# Patient Record
Sex: FEMALE | Race: WHITE | NOT HISPANIC OR LATINO | Employment: OTHER | URBAN - METROPOLITAN AREA
[De-identification: names, ages, dates, MRNs, and addresses within clinical notes are randomized per-mention and may not be internally consistent; named-entity substitution may affect disease eponyms.]

---

## 2017-01-05 ENCOUNTER — TELEPHONE (OUTPATIENT)
Dept: INTERNAL MEDICINE | Facility: CLINIC | Age: 77
End: 2017-01-05

## 2017-01-05 DIAGNOSIS — S52.502D CLOSED FRACTURE OF DISTAL END OF LEFT RADIUS WITH ROUTINE HEALING, UNSPECIFIED FRACTURE MORPHOLOGY, SUBSEQUENT ENCOUNTER: Primary | ICD-10-CM

## 2017-01-05 DIAGNOSIS — R30.0 DYSURIA: Primary | ICD-10-CM

## 2017-01-05 RX ORDER — TRAMADOL HYDROCHLORIDE 50 MG/1
50 TABLET ORAL
Qty: 30 TABLET | Refills: 0 | Status: SHIPPED | OUTPATIENT
Start: 2017-01-05 | End: 2017-02-15

## 2017-01-05 NOTE — TELEPHONE ENCOUNTER
Reason for Call:  Medication or medication refill:    Do you use a [facility]?  Name of the pharmacy and phone number for the current request:  [facility] - [phone]    Name of the medication requested: traMADol (ULTRAM) 50 MG tablet    Other request: patient will be going to [place] in about 2 weeks-requesting a refill on her Tramadol so she has it while she is gone. Please advise     Can we leave a detailed message on this number? YES    Phone number patient can be reached at: Home number on file [phone] (home)    Best Time: any     Call taken on 1/5/2017 at 2:01 PM by Claire Gonzalez

## 2017-01-05 NOTE — TELEPHONE ENCOUNTER
traMADol (ULTRAM) 50 MG tablet      Last Written Prescription Date:  10/11/16  Last Fill Quantity: 30,   # refills: 0  Last Office Visit with [facility], [facility] or [facility] prescribing provider: 10/21/16  Future Office visit:       Routing refill request to provider for review/approval because:  Drug not on the [facility], [facility] or [facility] refill protocol or controlled substance

## 2017-01-05 NOTE — TELEPHONE ENCOUNTER
Reason for Call: Request for an order or referral:    Order or referral being requested: UA     Date needed: as soon as possible    Has the patient been seen by the PCP for this problem? YES    Additional comments: patient believes she has uti and requesting a ua experiancing itching and burning     Phone number Patient can be reached at:  Home number on file [phone] (home)    Best Time:  any    Can we leave a detailed message on this number?  YES    Call taken on 1/5/2017 at 1:59 PM by Claire Gonzalez

## 2017-01-06 DIAGNOSIS — R30.0 DYSURIA: ICD-10-CM

## 2017-01-06 LAB
ALBUMIN UR-MCNC: 30 MG/DL
APPEARANCE UR: CLEAR
BACTERIA #/AREA URNS HPF: ABNORMAL /HPF
BILIRUB UR QL STRIP: NEGATIVE
COLOR UR AUTO: YELLOW
GLUCOSE UR STRIP-MCNC: NEGATIVE MG/DL
HGB UR QL STRIP: ABNORMAL
KETONES UR STRIP-MCNC: ABNORMAL MG/DL
LEUKOCYTE ESTERASE UR QL STRIP: ABNORMAL
NITRATE UR QL: POSITIVE
NON-SQ EPI CELLS #/AREA URNS LPF: ABNORMAL /LPF
PH UR STRIP: 6.5 PH (ref 5–7)
RBC #/AREA URNS AUTO: ABNORMAL /HPF (ref 0–2)
SP GR UR STRIP: 1.02 (ref 1–1.03)
URN SPEC COLLECT METH UR: ABNORMAL
UROBILINOGEN UR STRIP-ACNC: 0.2 EU/DL (ref 0.2–1)
WBC #/AREA URNS AUTO: ABNORMAL /HPF (ref 0–2)

## 2017-01-06 PROCEDURE — 87086 URINE CULTURE/COLONY COUNT: CPT | Mod: 90 | Performed by: INTERNAL MEDICINE

## 2017-01-06 PROCEDURE — 87088 URINE BACTERIA CULTURE: CPT | Mod: 90 | Performed by: INTERNAL MEDICINE

## 2017-01-06 PROCEDURE — 99000 SPECIMEN HANDLING OFFICE-LAB: CPT | Performed by: INTERNAL MEDICINE

## 2017-01-06 PROCEDURE — 87186 SC STD MICRODIL/AGAR DIL: CPT | Mod: 90 | Performed by: INTERNAL MEDICINE

## 2017-01-06 PROCEDURE — 81001 URINALYSIS AUTO W/SCOPE: CPT | Performed by: INTERNAL MEDICINE

## 2017-01-08 LAB
BACTERIA SPEC CULT: ABNORMAL
MICRO REPORT STATUS: ABNORMAL
MICROORGANISM SPEC CULT: ABNORMAL
SPECIMEN SOURCE: ABNORMAL

## 2017-01-10 ENCOUNTER — TELEPHONE (OUTPATIENT)
Dept: INTERNAL MEDICINE | Facility: CLINIC | Age: 77
End: 2017-01-10

## 2017-01-10 DIAGNOSIS — N30.00 ACUTE CYSTITIS WITHOUT HEMATURIA: Primary | ICD-10-CM

## 2017-01-10 RX ORDER — FLUCONAZOLE 150 MG/1
150 TABLET ORAL ONCE
Qty: 1 TABLET | Refills: 0 | Status: SHIPPED | OUTPATIENT
Start: 2017-01-10 | End: 2017-01-10

## 2017-01-10 RX ORDER — CIPROFLOXACIN 500 MG/1
500 TABLET, FILM COATED ORAL 2 TIMES DAILY
Qty: 14 TABLET | Refills: 0 | Status: SHIPPED | OUTPATIENT
Start: 2017-01-10 | End: 2018-05-10

## 2017-01-10 NOTE — PROGRESS NOTES
Quick Note:    Dear Charlotte, your recent test results are attached.   Your urine culture Demonstrates Klebsiella.   I will send a prescription to your pharmacy. We should recheck your urine upon completion.  Feel free to contact me via the office or My Chart if you have any questions regarding the above.    Sincerely,  Aron Villa, DO FACOI    ______

## 2017-01-20 ENCOUNTER — TELEPHONE (OUTPATIENT)
Dept: INTERNAL MEDICINE | Facility: CLINIC | Age: 77
End: 2017-01-20

## 2017-01-20 ENCOUNTER — TELEPHONE (OUTPATIENT)
Dept: INTERNAL MEDICINE | Facility: OTHER | Age: 77
End: 2017-01-20

## 2017-01-20 DIAGNOSIS — N39.0 RECURRENT URINARY TRACT INFECTION: Primary | ICD-10-CM

## 2017-01-20 NOTE — TELEPHONE ENCOUNTER
Tammi Christianson at 1/20/2017  9:32 AM      Status: Signed         Expand All Collapse All    Reason for Call: Request for an order or referral:    Order or referral being requested: Urologist. UTI's    Date needed: at your convenience    Has the patient been seen by the PCP for this problem? YES    Additional comments:     Phone number Patient can be reached at:       Best Time:      Can we leave a detailed message on this number?  YES    Call taken on 1/20/2017 at 9:32 AM by Tammi Christianson

## 2017-01-20 NOTE — TELEPHONE ENCOUNTER
[facility] [phone]  Patient notified. Florence EDMOND

## 2017-01-20 NOTE — TELEPHONE ENCOUNTER
Reason for Call: Request for an order or referral:    Order or referral being requested: Urologist. UTI's    Date needed: at your convenience    Has the patient been seen by the PCP for this problem? YES    Additional comments:     Phone number Patient can be reached at:      Best Time:      Can we leave a detailed message on this number?  YES    Call taken on 1/20/2017 at 9:32 AM by Tammi Christianson

## 2017-01-26 ENCOUNTER — TRANSFERRED RECORDS (OUTPATIENT)
Dept: HEALTH INFORMATION MANAGEMENT | Facility: CLINIC | Age: 77
End: 2017-01-26

## 2017-01-26 DIAGNOSIS — N39.0 URINARY TRACT INFECTION, SITE UNSPECIFIED: Primary | ICD-10-CM

## 2017-01-31 ENCOUNTER — HOSPITAL ENCOUNTER (OUTPATIENT)
Dept: ULTRASOUND IMAGING | Facility: CLINIC | Age: 77
Discharge: HOME | End: 2017-01-31
Attending: UROLOGY | Admitting: UROLOGY
Payer: MEDICARE

## 2017-01-31 DIAGNOSIS — N39.0 URINARY TRACT INFECTION, SITE UNSPECIFIED: ICD-10-CM

## 2017-01-31 PROCEDURE — 76770 US EXAM ABDO BACK WALL COMP: CPT

## 2017-01-31 NOTE — PROGRESS NOTES
Appropriate assistive devices provided during their visit.na  Exam table and/or cart  placed in the lowest position. yes  Brakes on tables/carts/wheelchairs used at all times. na  Non slip footwear applied.na    Patient was accompanied by staff throughout visit. yes  Equipment safety straps used.na  Assist with toileting.na

## 2017-02-10 ENCOUNTER — TELEPHONE (OUTPATIENT)
Dept: FAMILY MEDICINE | Facility: OTHER | Age: 77
End: 2017-02-10

## 2017-02-10 DIAGNOSIS — N39.41 URGENCY INCONTINENCE: Primary | ICD-10-CM

## 2017-02-10 RX ORDER — ALFUZOSIN HCL 10 MG/1
10 TABLET, EXTENDED RELEASE ORAL DAILY
Qty: 30 TABLET | Refills: 1 | COMMUNITY
Start: 2017-02-10 | End: 2022-03-01

## 2017-02-10 NOTE — TELEPHONE ENCOUNTER
Message from Aron Villa DO sent at 2/10/2017  4:42 PM CST -----        The patient can continue her simvastatin with the Alfuzosin.  There should be no significant interaction.              Thank you              Daisy      Patient notified. Florence EDMOND

## 2017-02-10 NOTE — TELEPHONE ENCOUNTER
Reason for Call:  Other prescription    Detailed comments: Pt states she was given alfuzosin 10 MG by Dr. Bertrand, and pt is wondering if she can start taking her simvastatin with this. Pt states she wants Dr. Villa's advice.    Phone Number Patient can be reached at: Home number on file [phone] (home)    Best Time: anytime    Can we leave a detailed message on this number? YES    Call taken on 2/10/2017 at 4:09 PM by Kell Moreland

## 2017-02-15 ENCOUNTER — TELEPHONE (OUTPATIENT)
Dept: FAMILY MEDICINE | Facility: OTHER | Age: 77
End: 2017-02-15

## 2017-02-15 DIAGNOSIS — S52.502D CLOSED FRACTURE OF DISTAL END OF LEFT RADIUS WITH ROUTINE HEALING, UNSPECIFIED FRACTURE MORPHOLOGY, SUBSEQUENT ENCOUNTER: ICD-10-CM

## 2017-02-15 RX ORDER — TRAMADOL HYDROCHLORIDE 50 MG/1
50 TABLET ORAL
Qty: 30 TABLET | Refills: 0 | Status: SHIPPED | OUTPATIENT
Start: 2017-02-15 | End: 2017-08-25

## 2017-02-15 RX ORDER — TRAMADOL HYDROCHLORIDE 50 MG/1
50 TABLET ORAL
Qty: 30 TABLET | Refills: 0 | Status: CANCELLED | OUTPATIENT
Start: 2017-02-15

## 2017-02-27 ENCOUNTER — TELEPHONE (OUTPATIENT)
Dept: INTERNAL MEDICINE | Facility: OTHER | Age: 77
End: 2017-02-27

## 2017-02-27 DIAGNOSIS — R30.0 DYSURIA: ICD-10-CM

## 2017-02-27 DIAGNOSIS — R30.0 DYSURIA: Primary | ICD-10-CM

## 2017-02-27 LAB
ALBUMIN UR-MCNC: 30 MG/DL
APPEARANCE UR: CLEAR
BACTERIA #/AREA URNS HPF: ABNORMAL /HPF
BILIRUB UR QL STRIP: NEGATIVE
COLOR UR AUTO: YELLOW
GLUCOSE UR STRIP-MCNC: NEGATIVE MG/DL
HGB UR QL STRIP: NEGATIVE
KETONES UR STRIP-MCNC: NEGATIVE MG/DL
LEUKOCYTE ESTERASE UR QL STRIP: ABNORMAL
NITRATE UR QL: NEGATIVE
NON-SQ EPI CELLS #/AREA URNS LPF: ABNORMAL /LPF
PH UR STRIP: 7 PH (ref 5–7)
RBC #/AREA URNS AUTO: ABNORMAL /HPF (ref 0–2)
SP GR UR STRIP: 1.02 (ref 1–1.03)
URN SPEC COLLECT METH UR: ABNORMAL
UROBILINOGEN UR STRIP-ACNC: 0.2 EU/DL (ref 0.2–1)
WBC #/AREA URNS AUTO: ABNORMAL /HPF (ref 0–2)

## 2017-02-27 PROCEDURE — 81001 URINALYSIS AUTO W/SCOPE: CPT | Performed by: INTERNAL MEDICINE

## 2017-02-27 PROCEDURE — 87086 URINE CULTURE/COLONY COUNT: CPT | Performed by: INTERNAL MEDICINE

## 2017-02-27 NOTE — TELEPHONE ENCOUNTER
Reason for Call: Request for an order or referral:    Order or referral being requested: lab    Date needed: as soon as possible    Has the patient been seen by the PCP for this problem? YES    Additional comments: poss UTI.     Phone number Patient can be reached at:  Home number on file [phone] (home)    Best Time:      Can we leave a detailed message on this number?  YES    Call taken on 2/27/2017 at 10:15 AM by Tammi Christianson

## 2017-02-27 NOTE — TELEPHONE ENCOUNTER
Provider please advise, can orders be placed for a UA or should the patient be seen in clinic?  Sav Waterman MA

## 2017-03-01 LAB
BACTERIA SPEC CULT: NORMAL
MICRO REPORT STATUS: NORMAL
SPECIMEN SOURCE: NORMAL

## 2017-03-02 ENCOUNTER — TRANSFERRED RECORDS (OUTPATIENT)
Dept: HEALTH INFORMATION MANAGEMENT | Facility: CLINIC | Age: 77
End: 2017-03-02

## 2017-03-02 DIAGNOSIS — E11.42 TYPE 2 DIABETES MELLITUS WITH DIABETIC POLYNEUROPATHY (H): ICD-10-CM

## 2017-03-02 NOTE — TELEPHONE ENCOUNTER
actos         Last Written Prescription Date: 5/20/16  Last Fill Quantity: 90, # refills: 3  Last Office Visit with [facility] or [facility] prescribing provider:  10/21/16        BP Readings from Last 3 Encounters:   12/07/16 118/58   12/03/16 126/66   10/21/16 124/70     Lab Results   Component Value Date    MICROL 17 10/10/2016     No results found for: MICROALBUMIN  Creatinine   Date Value Ref Range Status   10/21/2016 0.74 0.52 - 1.04 mg/dL Final   ]  GFR Estimate   Date Value Ref Range Status   10/21/2016 76 >60 mL/min/1.7m2 Final     Comment:     Non  GFR Calc   08/05/2016 61 >60 mL/min/1.7m2 Final     Comment:     Non  GFR Calc   05/13/2016 >90  Non  GFR Calc   >60 mL/min/1.7m2 Final     GFR Estimate If Black   Date Value Ref Range Status   10/21/2016 >90   GFR Calc   >60 mL/min/1.7m2 Final   08/05/2016 73 >60 mL/min/1.7m2 Final     Comment:      GFR Calc   05/13/2016 >90   GFR Calc   >60 mL/min/1.7m2 Final     Lab Results   Component Value Date    CHOL 165 10/10/2016     Lab Results   Component Value Date    HDL 66 10/10/2016     Lab Results   Component Value Date    LDL 77 10/10/2016    LDL 68 06/26/2012     Lab Results   Component Value Date    TRIG 110 10/10/2016     Lab Results   Component Value Date    CHOLHDLRATIO 2.5 08/19/2015     Lab Results   Component Value Date    AST 31 07/23/2012     Lab Results   Component Value Date    ALT 28 08/18/2014     Lab Results   Component Value Date    A1C 6.8 10/21/2016    A1C 7.1 05/13/2016    A1C 7.0 06/08/2015    A1C 6.9 11/12/2014    A1C 7.0 08/18/2014     Potassium   Date Value Ref Range Status   10/21/2016 4.1 3.4 - 5.3 mmol/L Final

## 2017-03-03 NOTE — PROGRESS NOTES
Dear Charlotte, your recent test results are attached.  Your urine sample demonstrated Routine sampling contamination.  This is normal.              Feel free to contact me via the office or My Chart if you have any questions regarding the above.    Sincerely,  DO JT Thompson

## 2017-03-06 RX ORDER — PIOGLITAZONEHYDROCHLORIDE 30 MG/1
TABLET ORAL
Qty: 90 TABLET | Refills: 3 | OUTPATIENT
Start: 2017-03-06

## 2017-03-06 NOTE — TELEPHONE ENCOUNTER
Prescription was sent 5/20/16 for #90 with 3 refills. Patient should have medication through 05/2017.   Pharmacy notified via E-Prescribe refusal.     Raya Garrett RN  [facility]

## 2017-05-05 ENCOUNTER — TELEPHONE (OUTPATIENT)
Dept: FAMILY MEDICINE | Facility: OTHER | Age: 77
End: 2017-05-05

## 2017-05-05 DIAGNOSIS — Z29.89 SBE (SUBACUTE BACTERIAL ENDOCARDITIS) PROPHYLAXIS CANDIDATE: Primary | ICD-10-CM

## 2017-05-05 RX ORDER — AMOXICILLIN 500 MG/1
2000 CAPSULE ORAL ONCE
Qty: 4 CAPSULE | Refills: 0 | Status: SHIPPED | OUTPATIENT
Start: 2017-05-05 | End: 2017-05-05

## 2017-05-05 NOTE — TELEPHONE ENCOUNTER
Reason for Call:  Other call back    Detailed comments: Patient states that she has an abscess on her tooth and she is wanting to go into the dentist today but she says she has metal in her body and she was advised that when she goes to the dentist she needs an antibiotic. Patient is currently taking Alfuzosin and she is wondering if that will be good enough or if she needs to have another antibiotic before she goes to the dentist. She is wanting to go into the dentist as soon as possible. Informed her that Daisy is out of office, wondering if another doctor could help in his absence. Please advise.     Phone Number Patient can be reached at: Cell number on file:    Telephone Information:   Mobile [phone]       Best Time: any    Can we leave a detailed message on this number? YES    Call taken on 5/5/2017 at 8:11 AM by Mihir Brewer

## 2017-05-19 DIAGNOSIS — B37.31 CANDIDIASIS OF VULVA AND VAGINA: ICD-10-CM

## 2017-05-19 RX ORDER — FLUCONAZOLE 150 MG/1
150 TABLET ORAL
Qty: 4 TABLET | Refills: 0 | Status: SHIPPED | OUTPATIENT
Start: 2017-05-19 | End: 2018-05-10

## 2017-05-19 NOTE — TELEPHONE ENCOUNTER
The patient will need to discontinue her simvastatin while taking the Diflucan. The concurrent usage of these 2 medications may increase her risk of deleterious side effects.  Daisy

## 2017-05-19 NOTE — TELEPHONE ENCOUNTER
Patient was given an antibiotic by her dentist for tooth infection. She now has a yeast infection and would like diflucan refill. Florence EDMOND

## 2017-08-22 DIAGNOSIS — E11.42 TYPE 2 DIABETES MELLITUS WITH DIABETIC POLYNEUROPATHY, WITHOUT LONG-TERM CURRENT USE OF INSULIN (H): ICD-10-CM

## 2017-08-22 DIAGNOSIS — G62.9 PERIPHERAL POLYNEUROPATHY: ICD-10-CM

## 2017-08-22 NOTE — TELEPHONE ENCOUNTER
Metformin         Last Written Prescription Date: 5/20/16  Last Fill Quantity: 270, # refills: 3  Last Office Visit with [facility], [facility] or  [facility] prescribing provider:  10/21/16        BP Readings from Last 3 Encounters:   12/07/16 118/58   12/03/16 126/66   10/21/16 124/70     Lab Results   Component Value Date    MICROL 17 10/10/2016     Lab Results   Component Value Date    UMALCR 23.94 10/10/2016     Creatinine   Date Value Ref Range Status   10/21/2016 0.74 0.52 - 1.04 mg/dL Final   ]  GFR Estimate   Date Value Ref Range Status   10/21/2016 76 >60 mL/min/1.7m2 Final     Comment:     Non  GFR Calc   08/05/2016 61 >60 mL/min/1.7m2 Final     Comment:     Non  GFR Calc   05/13/2016 >90  Non  GFR Calc   >60 mL/min/1.7m2 Final     GFR Estimate If Black   Date Value Ref Range Status   10/21/2016 >90   GFR Calc   >60 mL/min/1.7m2 Final   08/05/2016 73 >60 mL/min/1.7m2 Final     Comment:      GFR Calc   05/13/2016 >90   GFR Calc   >60 mL/min/1.7m2 Final     Lab Results   Component Value Date    CHOL 165 10/10/2016     Lab Results   Component Value Date    HDL 66 10/10/2016     Lab Results   Component Value Date    LDL 77 10/10/2016     Lab Results   Component Value Date    TRIG 110 10/10/2016     Lab Results   Component Value Date    CHOLHDLRATIO 2.5 08/19/2015     Lab Results   Component Value Date    AST 31 07/23/2012     Lab Results   Component Value Date    ALT 28 08/18/2014     Lab Results   Component Value Date    A1C 6.8 10/21/2016    A1C 7.1 05/13/2016    A1C 7.0 06/08/2015    A1C 6.9 11/12/2014    A1C 7.0 08/18/2014     Potassium   Date Value Ref Range Status   10/21/2016 4.1 3.4 - 5.3 mmol/L Final     Gabapentin      Last Written Prescription Date: 5/20/16  Last Fill Quantity: 540,  # refills: 3   Last Office Visit with [facility], [facility] or [facility] prescribing provider: 10/21/16                                              Pioglitazone         Last Written Prescription Date: 5/20/16  Last Fill Quantity: 90, # refills: 3  Last Office Visit with [facility], [facility] or [facility] prescribing provider:  10/21/16        BP Readings from Last 3 Encounters:   12/07/16 118/58   12/03/16 126/66   10/21/16 124/70     Lab Results   Component Value Date    MICROL 17 10/10/2016     Lab Results   Component Value Date    UMALCR 23.94 10/10/2016     Creatinine   Date Value Ref Range Status   10/21/2016 0.74 0.52 - 1.04 mg/dL Final   ]  GFR Estimate   Date Value Ref Range Status   10/21/2016 76 >60 mL/min/1.7m2 Final     Comment:     Non  GFR Calc   08/05/2016 61 >60 mL/min/1.7m2 Final     Comment:     Non  GFR Calc   05/13/2016 >90  Non  GFR Calc   >60 mL/min/1.7m2 Final     GFR Estimate If Black   Date Value Ref Range Status   10/21/2016 >90   GFR Calc   >60 mL/min/1.7m2 Final   08/05/2016 73 >60 mL/min/1.7m2 Final     Comment:      GFR Calc   05/13/2016 >90   GFR Calc   >60 mL/min/1.7m2 Final     Lab Results   Component Value Date    CHOL 165 10/10/2016     Lab Results   Component Value Date    HDL 66 10/10/2016     Lab Results   Component Value Date    LDL 77 10/10/2016     Lab Results   Component Value Date    TRIG 110 10/10/2016     Lab Results   Component Value Date    CHOLHDLRATIO 2.5 08/19/2015     Lab Results   Component Value Date    AST 31 07/23/2012     Lab Results   Component Value Date    ALT 28 08/18/2014     Lab Results   Component Value Date    A1C 6.8 10/21/2016    A1C 7.1 05/13/2016    A1C 7.0 06/08/2015    A1C 6.9 11/12/2014    A1C 7.0 08/18/2014     Potassium   Date Value Ref Range Status   10/21/2016 4.1 3.4 - 5.3 mmol/L Final

## 2017-08-25 ENCOUNTER — TELEPHONE (OUTPATIENT)
Dept: INTERNAL MEDICINE | Facility: CLINIC | Age: 77
End: 2017-08-25

## 2017-08-25 DIAGNOSIS — S52.502D CLOSED FRACTURE OF DISTAL END OF LEFT RADIUS WITH ROUTINE HEALING, UNSPECIFIED FRACTURE MORPHOLOGY, SUBSEQUENT ENCOUNTER: ICD-10-CM

## 2017-08-25 RX ORDER — TRAMADOL HYDROCHLORIDE 50 MG/1
50 TABLET ORAL
Qty: 30 TABLET | Refills: 0 | Status: SHIPPED | OUTPATIENT
Start: 2017-08-25 | End: 2018-06-04

## 2017-08-25 RX ORDER — PIOGLITAZONEHYDROCHLORIDE 30 MG/1
TABLET ORAL
Qty: 90 TABLET | Refills: 3 | Status: SHIPPED | OUTPATIENT
Start: 2017-08-25 | End: 2018-10-02

## 2017-08-25 RX ORDER — METFORMIN HYDROCHLORIDE 750 MG/1
TABLET, EXTENDED RELEASE ORAL
Qty: 180 TABLET | Refills: 3 | Status: SHIPPED | OUTPATIENT
Start: 2017-08-25 | End: 2018-10-02

## 2017-08-25 RX ORDER — GABAPENTIN 300 MG/1
CAPSULE ORAL
Qty: 270 CAPSULE | Refills: 3 | Status: SHIPPED | OUTPATIENT
Start: 2017-08-25 | End: 2018-10-02

## 2017-08-25 NOTE — TELEPHONE ENCOUNTER
Patient calling would like to know when this Rx will be ready, [facility] told patient she needed to call us to check when this would be ready, please call and advise

## 2017-08-25 NOTE — TELEPHONE ENCOUNTER
Reason for Call:  Medication or medication refill:    Do you use a [facility]?  Name of the pharmacy and phone number for the current request:  [facility]    Name of the medication requested: traMADol     Other request: patient would like this Rx faxed to [facility] pharmacy #[phone]    Can we leave a detailed message on this number? YES    Phone number patient can be reached at: Home number on file [phone] (home)    Best Time: any     Call taken on 8/25/2017 at 12:09 PM by Vilma Conteh

## 2017-09-08 ENCOUNTER — TELEPHONE (OUTPATIENT)
Dept: INTERNAL MEDICINE | Facility: OTHER | Age: 77
End: 2017-09-08

## 2017-09-08 NOTE — TELEPHONE ENCOUNTER
Given the benign nature of her injury, I believe the elevation and ice is our best bet. If in the next day or so, it does not seem to be improving, I would then present to the emergency department for appropriate evaluation and x-rays.    Daisy

## 2017-09-08 NOTE — TELEPHONE ENCOUNTER
Called and spoke to the patient. She said yesterday she tripped and fell flat on her face. She said she hurt both knees but one seems worse than the other. Patient said she was able to get up and walk after the fall. She said she thought it seemed pretty good.     Patient said this morning her knee was not doing very good. She said she has severe pain. Patient said she had a few tramadol left from a surgery so she took one of those. She said it did help with her pain some. She said it is not very swollen. Minor swelling in a few locations. Patient said the swelling and pain seem to be the worst right below her knee cap. She is able to walk and bear some weight on the knee. She said when she bends her knee she gets some shooting pain to the lower part of her leg. Patient was able to go up a few stairs. She said she has not iced it.     Patient denies: cold/blue/numb toes, obvious deformity/dislocation, unable to bear weight, extreme swelling, or signs of infection.     Patient said she was going to give it a few days to see if it would get better. She said with the amount of pain she had, she thought she better call and see if Dr. Villa thought she should come in. She would like Dr. Villa's advise on what to do and if he would like to see her.     Will route to provider to advise.     Raya Garrett RN  [facility]

## 2017-09-08 NOTE — TELEPHONE ENCOUNTER
Reason for Call:  Same Day Appointment, Requested Provider:  Aron Villa DO    PCP: Aron Villa    Reason for visit: knee pain. Pt fell yesterday.    Duration of symptoms: 1 day    Have you been treated for this in the past? Yes    Additional comments:     Can we leave a detailed message on this number? YES    Phone number patient can be reached at: Cell number on file:    Telephone Information:   Mobile [phone]       Best Time:     Call taken on 9/8/2017 at 10:39 AM by Tammi Christianson

## 2017-10-24 ENCOUNTER — DOCUMENTATION ONLY (OUTPATIENT)
Dept: OTHER | Facility: CLINIC | Age: 77
End: 2017-10-24

## 2017-10-24 DIAGNOSIS — Z71.89 ACP (ADVANCE CARE PLANNING): Chronic | ICD-10-CM
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
## 2017-11-13 ENCOUNTER — OFFICE VISIT (OUTPATIENT)
Dept: OTOLARYNGOLOGY | Facility: CLINIC | Age: 77
End: 2017-11-13
Payer: COMMERCIAL

## 2017-11-13 VITALS — WEIGHT: 160 LBS | OXYGEN SATURATION: 98 % | BODY MASS INDEX: 27.46 KG/M2 | HEART RATE: 78 BPM

## 2017-11-13 DIAGNOSIS — H61.21 IMPACTED CERUMEN OF RIGHT EAR: Primary | ICD-10-CM

## 2017-11-13 PROCEDURE — 69210 REMOVE IMPACTED EAR WAX UNI: CPT | Mod: RT | Performed by: OTOLARYNGOLOGY

## 2017-11-13 NOTE — MR AVS SNAPSHOT
After Visit Summary   11/13/2017    Charlotte Snow    MRN: [id number]           Patient Information     Date Of Birth          1940        Visit Information        Provider Department      11/13/2017 4:15 PM Juve Garcia MD [facility]        Today's Diagnoses     Impacted cerumen of right ear    -  1       Follow-ups after your visit        Who to contact     If you have questions or need follow up information about today's clinic visit or your schedule please contact [facility] directly at [phone].  Normal or non-critical lab and imaging results will be communicated to you by [employer], letter or phone within 4 business days after the clinic has received the results. If you do not hear from us within 7 days, please contact the clinic through [employer] or phone. If you have a critical or abnormal lab result, we will notify you by phone as soon as possible.  Submit refill requests through [employer] or call your pharmacy and they will forward the refill request to us. Please allow 3 business days for your refill to be completed.          Additional Information About Your Visit        MyChart Information     [employer] gives you secure access to your electronic health record. If you see a primary care provider, you can also send messages to your care team and make appointments. If you have questions, please call your primary care clinic.  If you do not have a primary care provider, please call [phone] and they will assist you.        Care EveryWhere ID     This is your Care EveryWhere ID. This could be used by other organizations to access your [facility] medical records  [id number]        Your Vitals Were     Pulse Pulse Oximetry BMI (Body Mass Index)             78 98% 27.46 kg/m2          Blood Pressure from Last 3 Encounters:   12/07/16 118/58   12/03/16 126/66   10/21/16 124/70    Weight from Last 3 Encounters:   11/13/17 72.6 kg (160 lb)   12/07/16 69.4 kg  (153 lb)   12/03/16 69.4 kg (153 lb)              We Performed the Following     REMOVE IMPACTED JUDIEN        Primary Care Provider Office Phone # Fax #    Aron Villa,  [phone] [address] [place] [address]        Equal Access to Services     [facility] HospitalADITHYA : Hadii aad ku hadasho Soomaali, waaxda luqadaha, qaybta kaalmada adeegyada, waxay renéin hayaan adeeg khdwainelevy laglory . So [facility] [phone].    ATENCIÓN: Si habla español, tiene a merrill disposición servicios gratuitos de asistencia lingüística. [facility] [phone].    We comply with applicable federal civil rights laws and [place] laws. We do not discriminate on the basis of race, color, national origin, age, disability, sex, sexual orientation, or gender identity.            Thank you!     Thank you for choosing [facility]  for your care. Our goal is always to provide you with excellent care. Hearing back from our patients is one way we can continue to improve our services. Please take a few minutes to complete the written survey that you may receive in the mail after your visit with us. Thank you!             Your Updated Medication List - Protect others around you: Learn how to safely use, store and throw away your medicines at www.disposemymeds.org.          This list is accurate as of: 11/13/17  5:11 PM.  Always use your most recent med list.                   Brand Name Dispense Instructions for use Diagnosis    ACCU-CHEK COMPACT PLUS test strip   Generic drug:  blood glucose monitoring     100 strip    TEST 1-2 TIMES DAILY    Type 2 diabetes, HbA1C goal < 8% (H)       ACETAMINOPHEN EXTRA STRENGTH 500 MG tablet   Generic drug:  acetaminophen      Take 1-2 tablets (500-1,000 mg) by mouth every 6 hours as needed for mild pain        amoxicillin 250 MG capsule    AMOXIL     Take 250 mg by mouth 3 times daily        aspirin 81 MG tablet     100    1 TABLET DAILY    Type II or unspecified type  diabetes mellitus without mention of complication, not stated as uncontrolled       BIOTIN 5000 5 MG Caps   Generic drug:  biotin     30 capsule    Take 1 capsule by mouth daily        blood glucose monitoring lancets     1 Box    Use to test blood sugar 1-2 times daily or as directed.    Type 2 diabetes, HbA1c goal < 7% (H)       CALCIUM 600 + D PO      Take 1 tablet by mouth daily        ciprofloxacin 500 MG tablet    CIPRO    14 tablet    Take 1 tablet (500 mg) by mouth 2 times daily    Acute cystitis without hematuria       EPINEPHrine 0.3 MG/0.3ML injection 2-pack    EPIPEN/ADRENACLICK/or ANY BX GENERIC EQUIV    1 each    Inject 0.3 mLs (0.3 mg) into the muscle once as needed for anaphylaxis    Allergic to bees       fluconazole 150 MG tablet    DIFLUCAN    4 tablet    Take 1 tablet (150 mg) by mouth every 3 days ----To be started upon conclusion of the antibiotic.----    Candidiasis of vulva and vagina       fosinopril 10 MG tablet    MONOPRIL    90 tablet    Take 1 tablet (10 mg) by mouth daily    CKD (chronic kidney disease) stage 2, GFR 60-89 ml/min       gabapentin 300 MG capsule    NEURONTIN    270 capsule    TAKE 1 CAPSULE THREE TIMES DAILY    Peripheral polyneuropathy       GLUCOSAMINE CHONDROITIN VIT D3 Caps      750/600/500 per capsule.  1 capsule by mouth twice daily.        metFORMIN 750 MG 24 hr tablet    GLUCOPHAGE-XR    180 tablet    TAKE 1 TABLET TWICE DAILY WITH MEALS    Type 2 diabetes mellitus with diabetic polyneuropathy, without long-term current use of insulin (H)       MULTI VIT/FL PO      1 TABLET DAILY        order for DME     1 each    Equipment being ordered: TENS    Hip pain, Knee pain       pioglitazone 30 MG tablet    ACTOS    90 tablet    TAKE 1 TABLET DAILY    Type 2 diabetes mellitus with diabetic polyneuropathy, without long-term current use of insulin (H)       simvastatin 5 MG tablet    ZOCOR    90 tablet    Take 1 tablet (5 mg) by mouth At Bedtime    Hyperlipidemia LDL goal  <100       traMADol 50 MG tablet    ULTRAM    30 tablet    Take 1 tablet (50 mg) by mouth nightly as needed for moderate pain    Closed fracture of distal end of left radius with routine healing, unspecified fracture morphology, subsequent encounter       UROXATRAL 10 MG 24 hr tablet   Generic drug:  alfuzosin     30 tablet    Take 1 tablet (10 mg) by mouth daily    Urgency incontinence
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
## 2017-11-13 NOTE — NURSING NOTE
"Chief Complaint   Patient presents with     Consult     Ear Problem     Ear cleaning       Initial Pulse 78  Wt 72.6 kg (160 lb)  SpO2 98%  BMI 27.46 kg/m2 Estimated body mass index is 27.46 kg/(m^2) as calculated from the following:    Height as of 5/23/16: 1.626 m (5' 4\").    Weight as of this encounter: 72.6 kg (160 lb).  Medication Reconciliation: complete  "

## 2018-04-10 ENCOUNTER — RADIANT APPOINTMENT (OUTPATIENT)
Dept: MAMMOGRAPHY | Facility: OTHER | Age: 78
End: 2018-04-10
Attending: INTERNAL MEDICINE
Payer: COMMERCIAL

## 2018-04-10 DIAGNOSIS — Z12.31 VISIT FOR SCREENING MAMMOGRAM: ICD-10-CM

## 2018-04-10 PROCEDURE — 77067 SCR MAMMO BI INCL CAD: CPT | Mod: TC

## 2018-04-13 NOTE — PROGRESS NOTES
Dear Charlotte, your recent test results are attached.  Mammogram was normal.  Recommend repeat in one year.  Feel free to contact me via the office or My Chart if you have any questions regarding the above.  Sincerely,  DO ALPHONSE ThompsonOI

## 2018-04-19 ENCOUNTER — TELEPHONE (OUTPATIENT)
Dept: INTERNAL MEDICINE | Facility: OTHER | Age: 78
End: 2018-04-19

## 2018-04-19 DIAGNOSIS — R82.90 NONSPECIFIC FINDING ON EXAMINATION OF URINE: Primary | ICD-10-CM

## 2018-04-19 DIAGNOSIS — R30.0 DYSURIA: ICD-10-CM

## 2018-04-19 DIAGNOSIS — R30.0 DYSURIA: Primary | ICD-10-CM

## 2018-04-19 LAB
ALBUMIN UR-MCNC: 30 MG/DL
APPEARANCE UR: CLEAR
BACTERIA #/AREA URNS HPF: ABNORMAL /HPF
BILIRUB UR QL STRIP: NEGATIVE
COLOR UR AUTO: YELLOW
GLUCOSE UR STRIP-MCNC: NEGATIVE MG/DL
HGB UR QL STRIP: NEGATIVE
HYALINE CASTS #/AREA URNS LPF: ABNORMAL /LPF
KETONES UR STRIP-MCNC: 15 MG/DL
LEUKOCYTE ESTERASE UR QL STRIP: ABNORMAL
MUCOUS THREADS #/AREA URNS LPF: PRESENT /LPF
NITRATE UR QL: NEGATIVE
PH UR STRIP: 5.5 PH (ref 5–7)
RBC #/AREA URNS AUTO: ABNORMAL /HPF
SOURCE: ABNORMAL
SP GR UR STRIP: >1.03 (ref 1–1.03)
URNS CMNT MICRO: ABNORMAL
UROBILINOGEN UR STRIP-ACNC: 0.2 EU/DL (ref 0.2–1)
WBC #/AREA URNS AUTO: ABNORMAL /HPF

## 2018-04-19 PROCEDURE — 81001 URINALYSIS AUTO W/SCOPE: CPT | Performed by: INTERNAL MEDICINE

## 2018-04-19 PROCEDURE — 87086 URINE CULTURE/COLONY COUNT: CPT | Performed by: INTERNAL MEDICINE

## 2018-04-19 NOTE — TELEPHONE ENCOUNTER
Reason for Call: Request for an order or referral:    Order or referral being requested: lab. Poss UTI    Date needed: as soon as possible    Has the patient been seen by the PCP for this problem? YES    Additional comments:     Phone number Patient can be reached at:      Best Time:      Can we leave a detailed message on this number?  YES    Call taken on 4/19/2018 at 9:01 AM by Tammi Christianson

## 2018-04-21 LAB
BACTERIA SPEC CULT: NORMAL
SPECIMEN SOURCE: NORMAL

## 2018-04-23 NOTE — PROGRESS NOTES
Dear Charlotte, your recent test results are attached.  Urine sample was consistent with sampling contamination.    Feel free to contact me via the office or My Chart if you have any questions regarding the above.  Sincerely,  DO ALPHONSE ThompsonOI

## 2018-05-10 ENCOUNTER — OFFICE VISIT (OUTPATIENT)
Dept: FAMILY MEDICINE | Facility: OTHER | Age: 78
End: 2018-05-10
Payer: COMMERCIAL

## 2018-05-10 ENCOUNTER — TELEPHONE (OUTPATIENT)
Dept: INTERNAL MEDICINE | Facility: OTHER | Age: 78
End: 2018-05-10

## 2018-05-10 ENCOUNTER — MYC MEDICAL ADVICE (OUTPATIENT)
Dept: FAMILY MEDICINE | Facility: OTHER | Age: 78
End: 2018-05-10

## 2018-05-10 VITALS
OXYGEN SATURATION: 97 % | TEMPERATURE: 97.2 F | HEART RATE: 74 BPM | WEIGHT: 165 LBS | DIASTOLIC BLOOD PRESSURE: 60 MMHG | SYSTOLIC BLOOD PRESSURE: 120 MMHG | RESPIRATION RATE: 16 BRPM | BODY MASS INDEX: 28.32 KG/M2

## 2018-05-10 DIAGNOSIS — N94.89 VAGINAL BURNING: ICD-10-CM

## 2018-05-10 DIAGNOSIS — N89.8 VAGINAL ITCHING: Primary | ICD-10-CM

## 2018-05-10 DIAGNOSIS — N81.6 RECTOCELE: ICD-10-CM

## 2018-05-10 LAB
SPECIMEN SOURCE: NORMAL
WET PREP SPEC: NORMAL

## 2018-05-10 PROCEDURE — 87210 SMEAR WET MOUNT SALINE/INK: CPT | Performed by: NURSE PRACTITIONER

## 2018-05-10 PROCEDURE — 99213 OFFICE O/P EST LOW 20 MIN: CPT | Performed by: NURSE PRACTITIONER

## 2018-05-10 RX ORDER — TRIAMCINOLONE ACETONIDE 1 MG/G
OINTMENT TOPICAL
Qty: 30 G | Refills: 0 | Status: SHIPPED | OUTPATIENT
Start: 2018-05-10 | End: 2018-05-31

## 2018-05-10 ASSESSMENT — ANXIETY QUESTIONNAIRES
3. WORRYING TOO MUCH ABOUT DIFFERENT THINGS: NOT AT ALL
GAD7 TOTAL SCORE: 0
2. NOT BEING ABLE TO STOP OR CONTROL WORRYING: NOT AT ALL
IF YOU CHECKED OFF ANY PROBLEMS ON THIS QUESTIONNAIRE, HOW DIFFICULT HAVE THESE PROBLEMS MADE IT FOR YOU TO DO YOUR WORK, TAKE CARE OF THINGS AT HOME, OR GET ALONG WITH OTHER PEOPLE: NOT DIFFICULT AT ALL
7. FEELING AFRAID AS IF SOMETHING AWFUL MIGHT HAPPEN: NOT AT ALL
1. FEELING NERVOUS, ANXIOUS, OR ON EDGE: NOT AT ALL
5. BEING SO RESTLESS THAT IT IS HARD TO SIT STILL: NOT AT ALL
6. BECOMING EASILY ANNOYED OR IRRITABLE: NOT AT ALL

## 2018-05-10 ASSESSMENT — PATIENT HEALTH QUESTIONNAIRE - PHQ9: 5. POOR APPETITE OR OVEREATING: NOT AT ALL

## 2018-05-10 NOTE — TELEPHONE ENCOUNTER
Reason for Call:  Request for results:    Name of test or procedure: UA     Date of test of procedure: 4/19    Location of the test or procedure: [facility]     OK to leave the result message on voice mail or with a family member? YES    Phone number Patient can be reached at:  Home number on file [phone] (home) or [phone]    Additional comments: Pt just saw these results on [employer] and would like a call to discuss.     Call taken on 5/10/2018 at 8:39 AM by Cristal Kapoor

## 2018-05-10 NOTE — TELEPHONE ENCOUNTER
I will close this encounter as Florence HAJI spoke to pt and she is now in clinic for an office visit with Bindu Allison APRN CNP.  ................Riky Mathur LPN,   May 10, 2018,      1:26 PM,   [facility]

## 2018-05-10 NOTE — MR AVS SNAPSHOT
After Visit Summary   5/10/2018    Charlotte Snow    MRN: [id number]           Patient Information     Date Of Birth          1940        Visit Information        Provider Department      5/10/2018 1:00 PM Bindu Allison APRN CNP [facility]        Today's Diagnoses     Vaginal itching    -  1    Vaginal burning        Rectocele          Care Instructions    Use the steroid ointment 3 times a day.     See Dr. Linder in [place] - she is the female OB/GYN.     You are due for a diabetes visit with Dr. Villa - you will need to be fasting for labs that day.               Follow-ups after your visit        Additional Services     OB/GYN REFERRAL       Your provider has referred you to:  [facility]: [facility] [place] [phone]   [email or web address] - Dr. Linder     Please be aware that coverage of these services is subject to the terms and limitations of your health insurance plan.  Call member services at your health plan with any benefit or coverage questions.      Please bring the following with you to your appointment:    (1) Any X-Rays, CTs or MRIs which have been performed.  Contact the facility where they were done to arrange for  prior to your scheduled appointment.   (2) List of current medications   (3) This referral request   (4) Any documents/labs given to you for this referral                  Who to contact     If you have questions or need follow up information about today's clinic visit or your schedule please contact [facility] directly at [phone].  Normal or non-critical lab and imaging results will be communicated to you by MyChart, letter or phone within 4 business days after the clinic has received the results. If you do not hear from us within 7 days, please contact the clinic through MyChart or phone. If you have a critical or abnormal lab result, we will notify you by phone as soon as  possible.  Submit refill requests through [employer] or call your pharmacy and they will forward the refill request to us. Please allow 3 business days for your refill to be completed.          Additional Information About Your Visit        [employer] Information     [employer] gives you secure access to your electronic health record. If you see a primary care provider, you can also send messages to your care team and make appointments. If you have questions, please call your primary care clinic.  If you do not have a primary care provider, please call [phone] and they will assist you.        Care EveryWhere ID     This is your Care EveryWhere ID. This could be used by other organizations to access your [facility] medical records  [id number]        Your Vitals Were     Pulse Temperature Respirations Pulse Oximetry Breastfeeding? BMI (Body Mass Index)    74 97.2  F (36.2  C) (Tympanic) 16 97% No 28.32 kg/m2       Blood Pressure from Last 3 Encounters:   05/10/18 120/60   12/07/16 118/58   12/03/16 126/66    Weight from Last 3 Encounters:   05/10/18 165 lb (74.8 kg)   11/13/17 160 lb (72.6 kg)   12/07/16 153 lb (69.4 kg)              We Performed the Following     OB/GYN REFERRAL     Wet prep          Today's Medication Changes          These changes are accurate as of 5/10/18  1:51 PM.  If you have any questions, ask your nurse or doctor.               Start taking these medicines.        Dose/Directions    triamcinolone 0.1 % ointment   Commonly known as:  KENALOG   Used for:  Vaginal itching   Started by:  Bindu Allison APRN CNP        Apply sparingly to affected area three times daily for 14 days.   Quantity:  30 g   Refills:  0            Where to get your medicines      These medications were sent to [id number] - [place], [place] - [address]   [address] [place] [address]    Hours:  M-F 8:30-6:30; Sat 9-4; closed Sunday Phone:  [phone]     triamcinolone 0.1 % ointment                Primary  Care Provider Office Phone # Fax #    Aronjamin Villa,  [phone]       [address] [place] [address]        Equal Access to Services     NAYAN SNELL : Hadii aad ku hadandreo Soomaali, waaxda luqadaha, qaybta kaalmada adeegyada, tyrese aguileradarwin susan. So [facility] [phone].    ATENCIÓN: Si habla español, tiene a merrill disposición servicios gratuitos de asistencia lingüística. Llame al [phone].    We comply with applicable federal civil rights laws and [place] laws. We do not discriminate on the basis of race, color, national origin, age, disability, sex, sexual orientation, or gender identity.            Thank you!     Thank you for choosing [facility]  for your care. Our goal is always to provide you with excellent care. Hearing back from our patients is one way we can continue to improve our services. Please take a few minutes to complete the written survey that you may receive in the mail after your visit with us. Thank you!             Your Updated Medication List - Protect others around you: Learn how to safely use, store and throw away your medicines at www.disposemymeds.org.          This list is accurate as of 5/10/18  1:51 PM.  Always use your most recent med list.                   Brand Name Dispense Instructions for use Diagnosis    ACCU-CHEK COMPACT PLUS test strip   Generic drug:  blood glucose monitoring     100 strip    TEST 1-2 TIMES DAILY    Type 2 diabetes, HbA1C goal < 8% (H)       ACETAMINOPHEN EXTRA STRENGTH 500 MG tablet   Generic drug:  acetaminophen      Take 1-2 tablets (500-1,000 mg) by mouth every 6 hours as needed for mild pain        aspirin 81 MG tablet     100    1 TABLET DAILY    Type II or unspecified type diabetes mellitus without mention of complication, not stated as uncontrolled       BIOTIN 5000 5 MG Caps   Generic drug:  biotin     30 capsule    Take 1 capsule by mouth daily        blood glucose monitoring  lancets     1 Box    Use to test blood sugar 1-2 times daily or as directed.    Type 2 diabetes, HbA1c goal < 7% (H)       CALCIUM 600 + D PO      Take 1 tablet by mouth daily        EPINEPHrine 0.3 MG/0.3ML injection 2-pack    EPIPEN/ADRENACLICK/or ANY BX GENERIC EQUIV    1 each    Inject 0.3 mLs (0.3 mg) into the muscle once as needed for anaphylaxis    Allergic to bees       gabapentin 300 MG capsule    NEURONTIN    270 capsule    TAKE 1 CAPSULE THREE TIMES DAILY    Peripheral polyneuropathy       GLUCOSAMINE CHONDROITIN VIT D3 Caps      750/600/500 per capsule.  1 capsule by mouth twice daily.        metFORMIN 750 MG 24 hr tablet    GLUCOPHAGE-XR    180 tablet    TAKE 1 TABLET TWICE DAILY WITH MEALS    Type 2 diabetes mellitus with diabetic polyneuropathy, without long-term current use of insulin (H)       MULTI VIT/FL PO      1 TABLET DAILY        order for DME     1 each    Equipment being ordered: TENS    Hip pain, Knee pain       pioglitazone 30 MG tablet    ACTOS    90 tablet    TAKE 1 TABLET DAILY    Type 2 diabetes mellitus with diabetic polyneuropathy, without long-term current use of insulin (H)       traMADol 50 MG tablet    ULTRAM    30 tablet    Take 1 tablet (50 mg) by mouth nightly as needed for moderate pain    Closed fracture of distal end of left radius with routine healing, unspecified fracture morphology, subsequent encounter       triamcinolone 0.1 % ointment    KENALOG    30 g    Apply sparingly to affected area three times daily for 14 days.    Vaginal itching       UROXATRAL 10 MG 24 hr tablet   Generic drug:  alfuzosin     30 tablet    Take 1 tablet (10 mg) by mouth daily    Urgency incontinence

## 2018-05-10 NOTE — PROGRESS NOTES
SUBJECTIVE:   Charlotte Snow is a 78 year old female who presents to clinic today for the following health issues:      Vaginal Symptoms      Duration: chronic, intermittent     Description  itching, burning and redness    Intensity:  moderate    Accompanying signs and symptoms (fever/dysuria/abdominal or back pain): None    History  Sexually active: yes, single partner, contraception not required  Possibility of pregnancy: No  Recent antibiotic use: YES    Precipitating or alleviating factors: Hx of yeast and UTI/polyps in past    Therapies tried and outcome: cranberry juice, increased fluids.    Chronic issues with vaginal itching, burning, repeated yeast infections and UTIs.  Has seen Urology in the past - was told she has urethral polyps and was started on Alfuzosin, has never seen OB/GYN for this.  Has been prescribed a vaginal cream once years ago.  She is not sure what it was and is no longer using this.  Has been on Diflucan multiple times for vaginal yeast infections and antibiotics for UTIs.  Also has vaginal pain with intercourse.      Problem list and histories reviewed & adjusted, as indicated.  Additional history: as documented    Patient Active Problem List   Diagnosis     Myalgia and myositis     Degenerative arthritis of thumb     Hyperlipidemia LDL goal <100     ACP (advance care planning)     Constipation     OA (osteoarthritis) of knee - right     Microalbuminuria     Shingles     History of herpes zoster     Toxic effect of toluene     Peripheral neuropathy     CKD (chronic kidney disease) stage 2, GFR 60-89 ml/min     Type 2 diabetes mellitus with diabetic polyneuropathy (H)     Closed fracture of distal end of left radius, unspecified fracture morphology, initial encounter     Left-sided low back pain with left-sided sciatica     Nonallopathic lesion of sacroiliac region     Somatic dysfunction of lumbar region     Past Surgical History:   Procedure Laterality Date     C APPENDECTOMY        C OPEN FIX INTER/SUBTROCH FX,IMPLNT  12/17/12    Right, Gamma     C TOTAL ABDOM HYSTERECTOMY      Hysterectomy, Total Abdominal     COLONOSCOPY  09/24/07     COLONOSCOPY  11/30/2010    COMBINED COLONOSCOPY, SINGLE BIOPSY/POLYPECTOMY BY BIOPSY performed by [facility]TE ADKINS at  [facility]     COLONOSCOPY N/A 12/7/2016    Procedure: COMBINED COLONOSCOPY, SINGLE OR MULTIPLE BIOPSY/POLYPECTOMY BY BIOPSY;  Surgeon: Annette Adkins MD;  Location:  [facility]     HC COLONOSCOPY W/WO BRUSH/WASH  09/13/2004    If path reveals adenomatous tissue, then repeat colonoscopy in 3 years.     HC REMOVAL OF TONSILS,<14 Y/O      Tonsils <13y.o.     HC REPAIR OF HAMMERTOE,ONE  8/20/2004    Arthroplasties, 4th and 5th digits left foot, with excision of painful keratoma distal medial aspect of 5th digit.     LAPAROSCOPIC CHOLECYSTECTOMY  8/13/2012    Procedure: LAPAROSCOPIC CHOLECYSTECTOMY;  Laparoscopic Cholectectomy;  Surgeon: Naseem Hollis MD;  Location: [facility]     OPEN REDUCTION INTERNAL FIXATION HIP  12/17/2012    Procedure: OPEN REDUCTION INTERNAL FIXATION HIP;  open reduction internal fixation hip, long gamma isabella;  Surgeon: Andrew Thorne MD;  Location: [facility]     OPEN REDUCTION INTERNAL FIXATION TIBIAL PLATEAU  08/25/10    R tibial plateau fx/open reduction internal fixation     OPEN REDUCTION INTERNAL FIXATION WRIST Left 4/8/2016    Procedure: OPEN REDUCTION INTERNAL FIXATION WRIST;  Surgeon: Rojelio Arzate MD;  Location:  OR     PHACOEMULSIFICATION CLEAR CORNEA WITH STANDARD INTRAOCULAR LENS IMPLANT  5/22/2012    Procedure:PHACOEMULSIFICATION CLEAR CORNEA WITH STANDARD INTRAOCULAR LENS IMPLANT; RIGHT PHACOEMULSIFICATION CLEAR CORNEA WITH STANDARD INTRAOCULAR LENS IMPLANT ; Surgeon:JESENIA ELLIOTT; Location: [facility]     PHACOEMULSIFICATION CLEAR CORNEA WITH STANDARD INTRAOCULAR LENS IMPLANT  6/14/2012    Procedure: PHACOEMULSIFICATION CLEAR CORNEA WITH STANDARD INTRAOCULAR LENS IMPLANT;  LEFT  PHACOEMULSIFICATION CLEAR CORNEA WITH STANDARD INTRAOCULAR LENS IMPLANT ;  Surgeon: Eulogio Castillo MD;  Location: [facility]       Social History   Substance Use Topics     Smoking status: Never Smoker     Smokeless tobacco: Never Used     Alcohol use No     Family History   Problem Relation Age of Onset     CANCER Mother      Abdominal     DIABETES Mother      DIABETES Maternal Grandmother      DIABETES Maternal Aunt      DIABETES Maternal Uncle      Circulatory Father      Hardening of the arteries     Hypertension Sister      Cancer - colorectal Sister      Thyroid Disease Sister      Hypertension Sister      Allergies Daughter      Allergies Son          Current Outpatient Prescriptions   Medication Sig Dispense Refill     ACCU-CHEK COMPACT PLUS test strip TEST 1-2 TIMES DAILY 100 strip 4     acetaminophen (ACETAMINOPHEN EXTRA STRENGTH) 500 MG tablet Take 1-2 tablets (500-1,000 mg) by mouth every 6 hours as needed for mild pain       alfuzosin (UROXATRAL) 10 MG 24 hr tablet Take 1 tablet (10 mg) by mouth daily 30 tablet 1     ASPIRIN 81 MG OR TABS 1 TABLET DAILY 100 0     biotin (BIOTIN 5000) 5 MG CAPS Take 1 capsule by mouth daily 30 capsule      blood glucose monitoring (ACCU-CHEK MULTICLIX) lancets Use to test blood sugar 1-2 times daily or as directed. 1 Box prn     Calcium Carbonate-Vitamin D (CALCIUM 600 + D OR) Take 1 tablet by mouth daily        EPINEPHrine (EPIPEN) 0.3 MG/0.3ML injection Inject 0.3 mLs (0.3 mg) into the muscle once as needed for anaphylaxis 1 each 3     gabapentin (NEURONTIN) 300 MG capsule TAKE 1 CAPSULE THREE TIMES DAILY 270 capsule 3     metFORMIN (GLUCOPHAGE-XR) 750 MG 24 hr tablet TAKE 1 TABLET TWICE DAILY WITH MEALS 180 tablet 3     Misc Natural Products (GLUCOSAMINE CHONDROITIN VIT D3) CAPS 750/600/500 per capsule.  1 capsule by mouth twice daily.       MULTI VIT/FL OR 1 TABLET DAILY       ORDER FOR DME Equipment being ordered: TENS 1 each 0     pioglitazone (ACTOS) 30 MG  tablet TAKE 1 TABLET DAILY 90 tablet 3     triamcinolone (KENALOG) 0.1 % ointment Apply sparingly to affected area three times daily for 14 days. 30 g 0     traMADol (ULTRAM) 50 MG tablet Take 1 tablet (50 mg) by mouth nightly as needed for moderate pain 30 tablet 0     Allergies   Allergen Reactions     Ace Inhibitors      Prinivil made her cough     Codeine      Losartan Potassium Hives     Cozaar     Sulfa Drugs Itching     BP Readings from Last 3 Encounters:   05/10/18 120/60   12/07/16 118/58   12/03/16 126/66    Wt Readings from Last 3 Encounters:   05/10/18 165 lb (74.8 kg)   11/13/17 160 lb (72.6 kg)   12/07/16 153 lb (69.4 kg)                    Reviewed and updated as needed this visit by clinical staff  Tobacco  Allergies  Meds       Reviewed and updated as needed this visit by Provider         ROS:  Constitutional, HEENT, cardiovascular, pulmonary, gi and gu systems are negative, except as otherwise noted.    OBJECTIVE:     /60  Pulse 74  Temp 97.2  F (36.2  C) (Tympanic)  Resp 16  Wt 165 lb (74.8 kg)  SpO2 97%  Breastfeeding? No  BMI 28.32 kg/m2  Body mass index is 28.32 kg/(m^2).  GENERAL: healthy, alert and no distress  RESP: lungs clear to auscultation - no rales, rhonchi or wheezes  CV: regular rate and rhythm, normal S1 S2, no S3 or S4, no murmur, click or rub, no peripheral edema and peripheral pulses strong   (female): vaginal mucosal atrophy and posterior vaginal bulging present - likely rectocele  MS: no gross musculoskeletal defects noted, no edema    Diagnostic Test Results:  Office Visit on 05/10/2018   Component Date Value Ref Range Status     Specimen Description 05/10/2018 Vagina   Final     Wet Prep 05/10/2018 No Trichomonas seen   Final     Wet Prep 05/10/2018 No clue cells seen   Final     Wet Prep 05/10/2018 No yeast seen   Final       ASSESSMENT/PLAN:       1. Vaginal itching  Wet prep negative.  Likely atrophic vaginitis. Will treat with short course of topical  steroid.  I am going to refer her to OB/GYN regarding the rectocele and recurrent infections so can discuss longer term management at that time.    - Wet prep  - OB/GYN REFERRAL  - triamcinolone (KENALOG) 0.1 % ointment; Apply sparingly to affected area three times daily for 14 days.  Dispense: 30 g; Refill: 0    2. Vaginal burning  - Wet prep  - OB/GYN REFERRAL    3. Rectocele  - OB/GYN REFERRAL    FUTURE APPOINTMENTS:       - Make appointment with OB/GYN specialist  See Patient Instructions    ERIC Herrera [facility]

## 2018-05-10 NOTE — PATIENT INSTRUCTIONS
Use the steroid ointment 3 times a day.     See Dr. Linder in [place] - she is the female OB/GYN.     You are due for a diabetes visit with Dr. Villa - you will need to be fasting for labs that day.

## 2018-05-10 NOTE — TELEPHONE ENCOUNTER
Patient notified of her results. She states she still has burning, and itching. When she looked in a mirror at her vaginal area she states it is all red. I asked her if she would like to get a vaginal exam to be checked for a yeast infection. She would like a female to do this. There is an opening on [facility] schedule for 1 o clock today. An appointment was made.  Flornece EDMOND

## 2018-05-11 ASSESSMENT — ANXIETY QUESTIONNAIRES: GAD7 TOTAL SCORE: 0

## 2018-05-14 ENCOUNTER — TRANSFERRED RECORDS (OUTPATIENT)
Dept: HEALTH INFORMATION MANAGEMENT | Facility: CLINIC | Age: 78
End: 2018-05-14

## 2018-05-21 ENCOUNTER — OFFICE VISIT (OUTPATIENT)
Dept: OBGYN | Facility: OTHER | Age: 78
End: 2018-05-21
Payer: COMMERCIAL

## 2018-05-21 VITALS
HEART RATE: 72 BPM | HEIGHT: 64 IN | DIASTOLIC BLOOD PRESSURE: 60 MMHG | SYSTOLIC BLOOD PRESSURE: 124 MMHG | WEIGHT: 165.5 LBS | BODY MASS INDEX: 28.25 KG/M2

## 2018-05-21 DIAGNOSIS — N34.2 POLYPOID URETHRITIS: ICD-10-CM

## 2018-05-21 DIAGNOSIS — N95.2 ATROPHIC VAGINITIS: Primary | ICD-10-CM

## 2018-05-21 DIAGNOSIS — N81.6 RECTOCELE: ICD-10-CM

## 2018-05-21 PROCEDURE — 99203 OFFICE O/P NEW LOW 30 MIN: CPT | Performed by: OBSTETRICS & GYNECOLOGY

## 2018-05-21 RX ORDER — ESTRADIOL 10 UG/1
10 INSERT VAGINAL
Qty: 12 TABLET | Refills: 3 | Status: SHIPPED | OUTPATIENT
Start: 2018-05-21 | End: 2018-05-22
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
## 2018-05-21 NOTE — NURSING NOTE
"Chief Complaint   Patient presents with     Vaginal Problem     vaginal itching/burning       Initial /60 (BP Location: Right arm, Patient Position: Chair, Cuff Size: Adult Regular)  Pulse 72  Ht 5' 3.54\" (1.614 m)  Wt 165 lb 8 oz (75.1 kg)  BMI 28.82 kg/m2 Estimated body mass index is 28.82 kg/(m^2) as calculated from the following:    Height as of this encounter: 5' 3.54\" (1.614 m).    Weight as of this encounter: 165 lb 8 oz (75.1 kg).  BP completed using cuff size: regular    No obstetric history on file.    The following HM Due: NONE      The following patient reported/Care Every where data was sent to:  P ABSTRACT QUALITY INITIATIVES [[id number]]       N/a    Maryellen Thorne, LILY  May 21, 2018           "

## 2018-05-21 NOTE — MR AVS SNAPSHOT
After Visit Summary   5/21/2018    Charlotte Snow    MRN: [id number]           Patient Information     Date Of Birth          1940        Visit Information        Provider Department      5/21/2018 3:15 PM Yaneth Linder MD [facility]        Today's Diagnoses     Atrophic vaginitis    -  1    Polypoid urethritis        Rectocele           Follow-ups after your visit        Additional Services     UROLOGY ADULT REFERRAL       Your provider has referred you to: FM: [facility] [phone]   [email or web address]    Please be aware that coverage of these services is subject to the terms and limitations of your health insurance plan.  Call member services at your health plan with any benefit or coverage questions.      Please bring the following with you to your appointment:    (1) Any X-Rays, CTs or MRIs which have been performed.  Contact the facility where they were done to arrange for  prior to your scheduled appointment.    (2) List of current medications  (3) This referral request   (4) Any documents/labs given to you for this referral                  Your next 10 appointments already scheduled     Jun 04, 2018  7:40 AM CDT   [facility]    [address] [facility] [address] [phone]           Bring a current list of meds and any records pertaining to this visit. For Physicals, please bring immunization records and any forms needing to be filled out. Please arrive 10 minutes early to complete paperwork.              Who to contact     If you have questions or need follow up information about today's clinic visit or your schedule please contact [facility] directly at [phone].  Normal or non-critical lab and imaging results will be communicated  "to you by [employer]in[employer], letter or phone within 4 business days after the clinic has received the results. If you do not hear from us within 7 days, please contact the clinic through [employer] or phone. If you have a critical or abnormal lab result, we will notify you by phone as soon as possible.  Submit refill requests through [employer] or call your pharmacy and they will forward the refill request to us. Please allow 3 business days for your refill to be completed.          Additional Information About Your Visit        [employer] Information     [employer] gives you secure access to your electronic health record. If you see a primary care provider, you can also send messages to your care team and make appointments. If you have questions, please call your primary care clinic.  If you do not have a primary care provider, please call [phone] and they will assist you.        Care EveryWhere ID     This is your Care EveryWhere ID. This could be used by other organizations to access your [facility] medical records  [id number]        Your Vitals Were     Pulse Height BMI (Body Mass Index)             72 5' 3.54\" (1.614 m) 28.82 kg/m2          Blood Pressure from Last 3 Encounters:   05/21/18 124/60   05/10/18 120/60   12/07/16 118/58    Weight from Last 3 Encounters:   05/21/18 165 lb 8 oz (75.1 kg)   05/10/18 165 lb (74.8 kg)   11/13/17 160 lb (72.6 kg)              We Performed the Following     UROLOGY ADULT REFERRAL          Today's Medication Changes          These changes are accurate as of 5/21/18  3:55 PM.  If you have any questions, ask your nurse or doctor.               Start taking these medicines.        Dose/Directions    estradiol 10 MCG Tabs vaginal tablet   Commonly known as:  VAGIFEM   Used for:  Atrophic vaginitis   Started by:  Yaneth Linder MD        Dose:  10 mcg   Place 1 tablet (10 mcg) vaginally twice a week   Quantity:  12 tablet   Refills:  3            Where to get your medicines      These " medications were sent to [id number] - [place], [place] - [address] [place] [address]  [address] [place] [address]    Hours:  M-F 8:30-6:30; Sat 9-4; closed Sunday Phone:  [phone]     estradiol 10 MCG Tabs vaginal tablet                Primary Care Provider Office Phone # Fax #    Aron Villa, [phone]       [address] [place] [address]        Equal Access to Services     NAYAN SNELL : Hadii aad ku hadasho Soomaali, waaxda luqadaha, qaybta kaalmada adeegyada, waxay idiin hayaan adeeg kharash laglory . So [facility] [phone].    ATENCIÓN: Si habla español, tiene a merrill disposición servicios gratuitos de asistencia lingüística. [facility] [phone].    We comply with applicable federal civil rights laws and [place] laws. We do not discriminate on the basis of race, color, national origin, age, disability, sex, sexual orientation, or gender identity.            Thank you!     Thank you for choosing [facility]  for your care. Our goal is always to provide you with excellent care. Hearing back from our patients is one way we can continue to improve our services. Please take a few minutes to complete the written survey that you may receive in the mail after your visit with us. Thank you!             Your Updated Medication List - Protect others around you: Learn how to safely use, store and throw away your medicines at www.disposemymeds.org.          This list is accurate as of 5/21/18  3:55 PM.  Always use your most recent med list.                   Brand Name Dispense Instructions for use Diagnosis    ACCU-CHEK COMPACT PLUS test strip   Generic drug:  blood glucose monitoring     100 strip    TEST 1-2 TIMES DAILY    Type 2 diabetes, HbA1C goal < 8% (H)       ACETAMINOPHEN EXTRA STRENGTH 500 MG tablet   Generic drug:  acetaminophen      Take 1-2 tablets (500-1,000 mg) by mouth every 6 hours as needed for mild pain        aspirin 81 MG tablet     100    1  TABLET DAILY    Type II or unspecified type diabetes mellitus without mention of complication, not stated as uncontrolled       BIOTIN 5000 5 MG Caps   Generic drug:  biotin     30 capsule    Take 1 capsule by mouth daily        blood glucose monitoring lancets     1 Box    Use to test blood sugar 1-2 times daily or as directed.    Type 2 diabetes, HbA1c goal < 7% (H)       CALCIUM 600 + D PO      Take 1 tablet by mouth daily        EPINEPHrine 0.3 MG/0.3ML injection 2-pack    EPIPEN/ADRENACLICK/or ANY BX GENERIC EQUIV    1 each    Inject 0.3 mLs (0.3 mg) into the muscle once as needed for anaphylaxis    Allergic to bees       estradiol 10 MCG Tabs vaginal tablet    VAGIFEM    12 tablet    Place 1 tablet (10 mcg) vaginally twice a week    Atrophic vaginitis       gabapentin 300 MG capsule    NEURONTIN    270 capsule    TAKE 1 CAPSULE THREE TIMES DAILY    Peripheral polyneuropathy       GLUCOSAMINE CHONDROITIN VIT D3 Caps      750/600/500 per capsule.  1 capsule by mouth twice daily.        metFORMIN 750 MG 24 hr tablet    GLUCOPHAGE-XR    180 tablet    TAKE 1 TABLET TWICE DAILY WITH MEALS    Type 2 diabetes mellitus with diabetic polyneuropathy, without long-term current use of insulin (H)       MULTI VIT/FL PO      1 TABLET DAILY        order for DME     1 each    Equipment being ordered: TENS    Hip pain, Knee pain       pioglitazone 30 MG tablet    ACTOS    90 tablet    TAKE 1 TABLET DAILY    Type 2 diabetes mellitus with diabetic polyneuropathy, without long-term current use of insulin (H)       traMADol 50 MG tablet    ULTRAM    30 tablet    Take 1 tablet (50 mg) by mouth nightly as needed for moderate pain    Closed fracture of distal end of left radius with routine healing, unspecified fracture morphology, subsequent encounter       triamcinolone 0.1 % ointment    KENALOG    30 g    Apply sparingly to affected area three times daily for 14 days.    Vaginal itching       UROXATRAL 10 MG 24 hr tablet   Generic  drug:  alfuzosin     30 tablet    Take 1 tablet (10 mg) by mouth daily    Urgency incontinence

## 2018-05-22 ENCOUNTER — TELEPHONE (OUTPATIENT)
Dept: OBGYN | Facility: OTHER | Age: 78
End: 2018-05-22

## 2018-05-22 DIAGNOSIS — N95.2 ATROPHIC VAGINITIS: Primary | ICD-10-CM

## 2018-05-22 RX ORDER — ESTRADIOL 0.1 MG/G
2 CREAM VAGINAL
Qty: 42.5 G | Refills: 3 | Status: SHIPPED | OUTPATIENT
Start: 2018-05-23 | End: 2018-10-04

## 2018-05-22 ASSESSMENT — PATIENT HEALTH QUESTIONNAIRE - PHQ9: SUM OF ALL RESPONSES TO PHQ QUESTIONS 1-9: 0

## 2018-05-22 NOTE — TELEPHONE ENCOUNTER
Prior Authorization Retail Medication Request    Medication/Dose: estradiol (VAGIFEM) 10 MCG TABS vaginal tablet  ICD code (if different than what is on RX):  N95.2  Previously Tried and Failed:    Rationale:      Insurance Name:  [employer]   Insurance ID:  [id number]      Pharmacy Information (if different than what is on RX)  Name:  [facility]  Phone:  [phone]

## 2018-05-22 NOTE — TELEPHONE ENCOUNTER
Please let the patient know it is ok to start with estrace cream.  This will come with an applicator and she can use this 2-3 times weekly, alternating with the other salve.  I am happy to discuss this if she has questions.  I am sure the pharmacist can discuss this with her as well.

## 2018-05-24 NOTE — TELEPHONE ENCOUNTER
Left message for patient to return call to clinic for below message from .  Charlotte Rashid CMA

## 2018-05-25 NOTE — TELEPHONE ENCOUNTER
Pt would like to speak to ob/gyn nurse please about this, I advised she is out until next week.

## 2018-05-25 NOTE — TELEPHONE ENCOUNTER
RN spoke with patient. Informed the estrace cream and tablets was too expensive around $300 and not covered by insurance. Requesting PA to be competed to get the tablets. Please advise if PA should be started at this time and have the tablets and send for 3 month supply via mail order. Due to MM being out of the clinic, routing to GG to review and advise. Naty Sahni RN, BSN

## 2018-05-29 NOTE — TELEPHONE ENCOUNTER
PA Initiation TIER EXCEPTION REQUEST FOR ESTRADIOL CREAM    Medication: estradiol (VAGIFEM) 10 MCG TABS-MED CHANGE  Insurance Company: [email or web address] - Phone [phone] Fax [phone]  Pharmacy Filling the Rx: [id number] - [place] - [address] [place] [address]  Filling Pharmacy Phone: [phone]  Filling Pharmacy Fax: [phone]  Start Date: 5/29/2018    INITIATED VIA PHONE TO [email or web address] #[phone].      THIS HAS BEEN SUBMITTED BY THE PRIOR-AUTHORIZATION TEAM. ANY QUESTIONS PLEASE CALL [phone]. THANK YOU

## 2018-05-29 NOTE — TELEPHONE ENCOUNTER
It looks like her insurance prefers the cream, did she try/fail the estrace cream and had an intolerance to it?  Unfortunately the insurance won't approve the tablets if the only reason is that the cream is too expensive.  We can still do a PA but if there is a reason why she can't use the cream it will have a better chance of getting approved.

## 2018-05-30 NOTE — TELEPHONE ENCOUNTER
PRIOR AUTHORIZATION DENIED    Medication: estradiol TIER EXCEPTION DENIED     Denial Date: 5/30/2018    Denial Rational: ESTRACE IS FORMULARY, NOT ELIGIBLE FOR TIER EXPCEPTION. PATIENT MAY HAVE HIGH DEDUCTIBLE WITH HER INSURANCE.          Appeal Information: PER INSURANCE PREMARIN AND ESTRACE ON FORMULARY.

## 2018-05-31 DIAGNOSIS — N89.8 VAGINAL ITCHING: ICD-10-CM

## 2018-05-31 RX ORDER — TRIAMCINOLONE ACETONIDE 1 MG/G
OINTMENT TOPICAL
Qty: 30 G | Refills: 0 | Status: SHIPPED | OUTPATIENT
Start: 2018-05-31 | End: 2018-10-04

## 2018-05-31 NOTE — TELEPHONE ENCOUNTER
Left message for patient to return call to relay that her insurance has denied PA tier coverage so the expense for the medication will be out of pocket for her.

## 2018-05-31 NOTE — TELEPHONE ENCOUNTER
Contacted patient, she spoke with Ernestina they notified her once she meets her deductible the out of pocket cost to her will be minimal so she is going to stick with the formulary covered cream.

## 2018-05-31 NOTE — TELEPHONE ENCOUNTER
Patient calling back to check on status of request. Informed that she is in clinic and should be able to  Review shortly    Dasha Vann  Reception/ Scheduling

## 2018-05-31 NOTE — TELEPHONE ENCOUNTER
"Requested Prescriptions   Pending Prescriptions Disp Refills     triamcinolone (KENALOG) 0.1 % ointment [Pharmacy Med Name: TRIAMCINOLONE 0.1% OINTMENT] 30 g     Last Written Prescription Date: 05/10/2018  Last Fill Quantity: 30g,  # refills: 0   Last office visit: 5/10/2018 with prescribing provider:  05/10/2018   Future Office Visit:   Next 5 appointments (look out 90 days)     Jun 04, 2018  7:40 AM CDT   [facility]    [address] [facility] [id number] [phone]                  Sig: APPLY TO AFFECTED AREA SPARINGLY 3 TIMES A DAY FOR 14 DAYS    Topical Steroid Protocol Passed    5/31/2018  3:02 PM       Passed - Patient is age 6 or older       Passed - Authorizing prescriber's most recent note related to this medication read.       Passed - High potency steroid not ordered       Passed - Recent (12 mo) or future (30 days) visit within the authorizing provider's specialty    Patient had office visit in the last 12 months or has a visit in the next 30 days with authorizing provider or within the authorizing provider's specialty.  See \"Patient Info\" tab in inbasket, or \"Choose Columns\" in Meds & Orders section of the refill encounter.              "

## 2018-05-31 NOTE — TELEPHONE ENCOUNTER
"Routing refill request to provider for review/approval because:  RX was prescribed for \"short course\". Will have provider review refill request.     Raya Garrett RN  [facility]      "

## 2018-06-04 ENCOUNTER — TELEPHONE (OUTPATIENT)
Dept: FAMILY MEDICINE | Facility: OTHER | Age: 78
End: 2018-06-04

## 2018-06-04 ENCOUNTER — OFFICE VISIT (OUTPATIENT)
Dept: FAMILY MEDICINE | Facility: OTHER | Age: 78
End: 2018-06-04
Payer: COMMERCIAL

## 2018-06-04 VITALS
OXYGEN SATURATION: 97 % | WEIGHT: 158.8 LBS | DIASTOLIC BLOOD PRESSURE: 62 MMHG | HEART RATE: 86 BPM | SYSTOLIC BLOOD PRESSURE: 102 MMHG | TEMPERATURE: 96.5 F | BODY MASS INDEX: 27.65 KG/M2

## 2018-06-04 DIAGNOSIS — S52.502D CLOSED FRACTURE OF DISTAL END OF LEFT RADIUS WITH ROUTINE HEALING, UNSPECIFIED FRACTURE MORPHOLOGY, SUBSEQUENT ENCOUNTER: ICD-10-CM

## 2018-06-04 DIAGNOSIS — E11.42 TYPE 2 DIABETES MELLITUS WITH DIABETIC POLYNEUROPATHY, WITHOUT LONG-TERM CURRENT USE OF INSULIN (H): Primary | ICD-10-CM

## 2018-06-04 DIAGNOSIS — S29.012A STRAIN OF RHOMBOID MUSCLE, INITIAL ENCOUNTER: ICD-10-CM

## 2018-06-04 DIAGNOSIS — E78.5 HYPERLIPIDEMIA LDL GOAL <100: ICD-10-CM

## 2018-06-04 LAB
ANION GAP SERPL CALCULATED.3IONS-SCNC: 6 MMOL/L (ref 3–14)
BUN SERPL-MCNC: 19 MG/DL (ref 7–30)
CALCIUM SERPL-MCNC: 8.9 MG/DL (ref 8.5–10.1)
CHLORIDE SERPL-SCNC: 104 MMOL/L (ref 94–109)
CHOLEST SERPL-MCNC: 253 MG/DL
CO2 SERPL-SCNC: 31 MMOL/L (ref 20–32)
CREAT SERPL-MCNC: 0.84 MG/DL (ref 0.52–1.04)
CREAT UR-MCNC: 307 MG/DL
GFR SERPL CREATININE-BSD FRML MDRD: 66 ML/MIN/1.7M2
GLUCOSE SERPL-MCNC: 111 MG/DL (ref 70–99)
HBA1C MFR BLD: 6.8 % (ref 0–5.6)
HDLC SERPL-MCNC: 74 MG/DL
LDLC SERPL CALC-MCNC: 155 MG/DL
MICROALBUMIN UR-MCNC: 66 MG/L
MICROALBUMIN/CREAT UR: 21.47 MG/G CR (ref 0–25)
NONHDLC SERPL-MCNC: 179 MG/DL
POTASSIUM SERPL-SCNC: 4.3 MMOL/L (ref 3.4–5.3)
SODIUM SERPL-SCNC: 141 MMOL/L (ref 133–144)
TRIGL SERPL-MCNC: 122 MG/DL

## 2018-06-04 PROCEDURE — 80048 BASIC METABOLIC PNL TOTAL CA: CPT | Performed by: INTERNAL MEDICINE

## 2018-06-04 PROCEDURE — 99214 OFFICE O/P EST MOD 30 MIN: CPT | Performed by: INTERNAL MEDICINE

## 2018-06-04 PROCEDURE — 83036 HEMOGLOBIN GLYCOSYLATED A1C: CPT | Performed by: INTERNAL MEDICINE

## 2018-06-04 PROCEDURE — 80061 LIPID PANEL: CPT | Performed by: INTERNAL MEDICINE

## 2018-06-04 PROCEDURE — 99207 C FOOT EXAM  NO CHARGE: CPT | Performed by: INTERNAL MEDICINE

## 2018-06-04 PROCEDURE — 82043 UR ALBUMIN QUANTITATIVE: CPT | Performed by: INTERNAL MEDICINE

## 2018-06-04 PROCEDURE — 36415 COLL VENOUS BLD VENIPUNCTURE: CPT | Performed by: INTERNAL MEDICINE

## 2018-06-04 RX ORDER — TRAMADOL HYDROCHLORIDE 50 MG/1
50 TABLET ORAL
Qty: 30 TABLET | Refills: 0 | Status: SHIPPED | OUTPATIENT
Start: 2018-06-04 | End: 2018-10-04

## 2018-06-04 ASSESSMENT — PAIN SCALES - GENERAL: PAINLEVEL: MODERATE PAIN (4)

## 2018-06-04 NOTE — TELEPHONE ENCOUNTER
Reason for Call:  Other AVS    Detailed comments: patient is requesting to  the after visit summary at the , please call when available to     Phone Number Patient can be reached at: Home number on file [phone] (home)    Best Time:     Can we leave a detailed message on this number? YES    Call taken on 6/4/2018 at 9:49 AM by Kaylyn Cruz

## 2018-06-04 NOTE — MR AVS SNAPSHOT
After Visit Summary   6/4/2018    Charlotte Snow    MRN: [id number]           Patient Information     Date Of Birth          1940        Visit Information        Provider Department      6/4/2018 7:40 AM Aron Villa DO [facility]        Today's Diagnoses     Type 2 diabetes mellitus with diabetic polyneuropathy, without long-term current use of insulin (H)    -  1    Hyperlipidemia LDL goal <100        Closed fracture of distal end of left radius with routine healing, unspecified fracture morphology, subsequent encounter        Strain of rhomboid muscle, initial encounter           Follow-ups after your visit        Follow-up notes from your care team     Return in about 4 months (around 10/4/2018).      Who to contact     If you have questions or need follow up information about today's clinic visit or your schedule please contact [facility] directly at [phone].  Normal or non-critical lab and imaging results will be communicated to you by MyChart, letter or phone within 4 business days after the clinic has received the results. If you do not hear from us within 7 days, please contact the clinic through [email or web address] or phone. If you have a critical or abnormal lab result, we will notify you by phone as soon as possible.  Submit refill requests through [employer] or call your pharmacy and they will forward the refill request to us. Please allow 3 business days for your refill to be completed.          Additional Information About Your Visit        MyChart Information     [employer] gives you secure access to your electronic health record. If you see a primary care provider, you can also send messages to your care team and make appointments. If you have questions, please call your primary care clinic.  If you do not have a primary care provider, please call [phone] and they will assist you.        Care EveryWhere ID     This is your Care EveryWhere ID. This  could be used by other organizations to access your [facility] medical records  [id number]        Your Vitals Were     Pulse Temperature Pulse Oximetry Breastfeeding? BMI (Body Mass Index)       86 96.5  F (35.8  C) (Temporal) 97% No 27.65 kg/m2        Blood Pressure from Last 3 Encounters:   06/04/18 102/62   05/21/18 124/60   05/10/18 120/60    Weight from Last 3 Encounters:   06/04/18 158 lb 12.8 oz (72 kg)   05/21/18 165 lb 8 oz (75.1 kg)   05/10/18 165 lb (74.8 kg)              We Performed the Following     Albumin Random Urine Quantitative with Creat Ratio     Basic metabolic panel  (Ca, Cl, CO2, Creat, Gluc, K, Na, BUN)     FOOT EXAM     Hemoglobin A1c     Lipid panel reflex to direct LDL Fasting          Today's Medication Changes          These changes are accurate as of 6/4/18 10:15 AM.  If you have any questions, ask your nurse or doctor.               Start taking these medicines.        Dose/Directions    ACE/ARB/ARNI NOT PRESCRIBED (INTENTIONAL)   Used for:  Type 2 diabetes mellitus with diabetic polyneuropathy, without long-term current use of insulin (H)   Started by:  Aron Villa, DO        Please choose reason not prescribed, below   Refills:  0            Where to get your medicines      Some of these will need a paper prescription and others can be bought over the counter.  Ask your nurse if you have questions.     Bring a paper prescription for each of these medications     traMADol 50 MG tablet       You don't need a prescription for these medications     ACE/ARB/ARNI NOT PRESCRIBED (INTENTIONAL)               Information about OPIOIDS     PRESCRIPTION OPIOIDS: WHAT YOU NEED TO KNOW   You have a prescription for an opioid (narcotic) pain medicine. Opioids can cause addiction. If you have a history of chemical dependency of any type, you are at a higher risk of becoming addicted to opioids. Only take this medicine after all other options have been tried. Take it for as short a  time and as few doses as possible.     Do not:    Drive. If you drive while taking these medicines, you could be arrested for driving under the influence (DUI).    Operate heavy machinery    Do any other dangerous activities while taking these medicines.     Drink any alcohol while taking these medicines.      Take with any other medicines that contain acetaminophen. Read all labels carefully. Look for the word  acetaminophen  or  Tylenol.  Ask your pharmacist if you have questions or are unsure.    Store your pills in a secure place, locked if possible. We will not replace any lost or stolen medicine. If you don t finish your medicine, please throw away (dispose) as directed by your pharmacist. The [place] Pollution Control Agency has more information about safe disposal: https://www.pca.[facility].[place].us/living-green/managing-unwanted-medications    All opioids tend to cause constipation. Drink plenty of water and eat foods that have a lot of fiber, such as fruits, vegetables, prune juice, apple juice and high-fiber cereal. Take a laxative (Miralax, milk of magnesia, Colace, Senna) if you don t move your bowels at least every other day.          Primary Care Provider Office Phone # Fax #    Aron Dillon Villa,  [phone]       [address] [place] [address] [place] [address]        Equal Access to Services     NAYAN SNELL AH: Hadii aad ku hadasho Soomaali, waaxda luqadaha, qaybta kaalmada adeegyada, waxay patti davis. So [facility] [phone].    ATENCIÓN: Si habla español, tiene a merrill disposición servicios gratuitos de asistencia lingüística. Llame al [phone].    We comply with applicable federal civil rights laws and [place] laws. We do not discriminate on the basis of race, color, national origin, age, disability, sex, sexual orientation, or gender identity.            Thank you!     Thank you for choosing [facility]  for your care. Our goal is always to provide  you with excellent care. Hearing back from our patients is one way we can continue to improve our services. Please take a few minutes to complete the written survey that you may receive in the mail after your visit with us. Thank you!             Your Updated Medication List - Protect others around you: Learn how to safely use, store and throw away your medicines at www.disposemymeds.org.          This list is accurate as of 6/4/18 10:15 AM.  Always use your most recent med list.                   Brand Name Dispense Instructions for use Diagnosis    ACCU-CHEK COMPACT PLUS test strip   Generic drug:  blood glucose monitoring     100 strip    TEST 1-2 TIMES DAILY    Type 2 diabetes, HbA1C goal < 8% (H)       ACE/ARB/ARNI NOT PRESCRIBED (INTENTIONAL)      Please choose reason not prescribed, below    Type 2 diabetes mellitus with diabetic polyneuropathy, without long-term current use of insulin (H)       ACETAMINOPHEN EXTRA STRENGTH 500 MG tablet   Generic drug:  acetaminophen      Take 1-2 tablets (500-1,000 mg) by mouth every 6 hours as needed for mild pain        aspirin 81 MG tablet     100    1 TABLET DAILY    Type II or unspecified type diabetes mellitus without mention of complication, not stated as uncontrolled       BIOTIN 5000 5 MG Caps   Generic drug:  biotin     30 capsule    Take 1 capsule by mouth daily        blood glucose monitoring lancets     1 Box    Use to test blood sugar 1-2 times daily or as directed.    Type 2 diabetes, HbA1c goal < 7% (H)       CALCIUM 600 + D PO      Take 1 tablet by mouth daily        EPINEPHrine 0.3 MG/0.3ML injection 2-pack    EPIPEN/ADRENACLICK/or ANY BX GENERIC EQUIV    1 each    Inject 0.3 mLs (0.3 mg) into the muscle once as needed for anaphylaxis    Allergic to bees       estradiol 0.1 MG/GM cream    ESTRACE    42.5 g    Place 2 g vaginally three times a week    Atrophic vaginitis       gabapentin 300 MG capsule    NEURONTIN    270 capsule    TAKE 1 CAPSULE THREE  TIMES DAILY    Peripheral polyneuropathy       GLUCOSAMINE CHONDROITIN VIT D3 Caps      750/600/500 per capsule.  1 capsule by mouth twice daily.        metFORMIN 750 MG 24 hr tablet    GLUCOPHAGE-XR    180 tablet    TAKE 1 TABLET TWICE DAILY WITH MEALS    Type 2 diabetes mellitus with diabetic polyneuropathy, without long-term current use of insulin (H)       MULTI VIT/FL PO      1 TABLET DAILY        pioglitazone 30 MG tablet    ACTOS    90 tablet    TAKE 1 TABLET DAILY    Type 2 diabetes mellitus with diabetic polyneuropathy, without long-term current use of insulin (H)       traMADol 50 MG tablet    ULTRAM    30 tablet    Take 1 tablet (50 mg) by mouth nightly as needed for moderate pain    Closed fracture of distal end of left radius with routine healing, unspecified fracture morphology, subsequent encounter       triamcinolone 0.1 % ointment    KENALOG    30 g    APPLY TO AFFECTED AREA SPARINGLY 3 TIMES A DAY FOR 14 DAYS    Vaginal itching       UROXATRAL 10 MG 24 hr tablet   Generic drug:  alfuzosin     30 tablet    Take 1 tablet (10 mg) by mouth daily    Urgency incontinence
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
## 2018-06-05 NOTE — PROGRESS NOTES
Dear Charlotte, your recent test results are attached.  The microalbumin is normal.  The cholesterol is slightly elevated with an LDL of 155.  The chemistry panel shows a mildly elevated blood sugar 111 with excellent kidney function.  The hemoglobin A1c is stable at 6.8.      Feel free to contact me via the office or My Chart if you have any questions regarding the above.  Sincerely,  Aron Villa, DO [facility]

## 2018-10-02 DIAGNOSIS — E11.42 TYPE 2 DIABETES MELLITUS WITH DIABETIC POLYNEUROPATHY, WITHOUT LONG-TERM CURRENT USE OF INSULIN (H): ICD-10-CM

## 2018-10-02 DIAGNOSIS — G62.9 PERIPHERAL POLYNEUROPATHY: ICD-10-CM

## 2018-10-03 RX ORDER — METFORMIN HYDROCHLORIDE 750 MG/1
TABLET, EXTENDED RELEASE ORAL
Qty: 180 TABLET | Refills: 1 | Status: SHIPPED | OUTPATIENT
Start: 2018-10-03 | End: 2019-04-08

## 2018-10-03 RX ORDER — GABAPENTIN 300 MG/1
CAPSULE ORAL
Qty: 270 CAPSULE | Refills: 3 | Status: SHIPPED | OUTPATIENT
Start: 2018-10-03 | End: 2020-03-09

## 2018-10-03 RX ORDER — PIOGLITAZONEHYDROCHLORIDE 30 MG/1
TABLET ORAL
Qty: 90 TABLET | Refills: 1 | Status: SHIPPED | OUTPATIENT
Start: 2018-10-03 | End: 2019-04-08

## 2018-10-03 NOTE — TELEPHONE ENCOUNTER
Gabapentin      Last Written Prescription Date:  8/25/17  Last Fill Quantity: 270,   # refills: 3  Last Office Visit: 6/4/18  Future Office visit:    Next 5 appointments (look out 90 days)     Oct 04, 2018  9:00 AM CDT   [facility]inder MD   [facility]    [address] [facility] [address]   [phone]                   Routing refill request to provider for review/approval because:  Drug not on the G, P or [facility] refill protocol or controlled substance    Routing refill request to provider for review/approval because:  Labs out of range:  LDL  Labs not current:  AST/ALT    ANGY Montana, RN  [facility]

## 2018-10-03 NOTE — TELEPHONE ENCOUNTER
Requested Prescriptions   Pending Prescriptions Disp Refills     gabapentin (NEURONTIN) 300 MG capsule [Pharmacy Med Name: GABAPENTIN 300 MG Capsule] 270 capsule 3     Sig: TAKE 1 CAPSULE THREE TIMES DAILY    There is no refill protocol information for this order        pioglitazone (ACTOS) 30 MG tablet [Pharmacy Med Name: PIOGLITAZONE HCL 30 MG Tablet] 90 tablet 3     Sig: TAKE 1 TABLET EVERY DAY    Thiazolidinedione Agents (TZDs)  Failed    10/2/2018  5:44 PM       Failed - Patient has a normal ALT within the past 12 mos.    Recent Labs   Lab Test  08/18/14   0759   ALT  28            Failed - Patient has a normal AST within the past 12 mos.     Recent Labs   Lab Test  07/23/12   0919   AST  31            Passed - Blood pressure less than 140/90 in past 6 months    BP Readings from Last 3 Encounters:   06/04/18 102/62   05/21/18 124/60   05/10/18 120/60                Passed - Patient has documented LDL within the past 12 mos.    Recent Labs   Lab Test  06/04/18   0815   LDL  155*            Passed - Patient has had a Microalbumin in the past 12 mos.    Recent Labs   Lab Test  06/04/18   0922   MICROL  66   UMALCR  21.47            Passed - Patient has documented A1c within the specified period of time.    If HgbA1C is 8 or greater, it needs to be on file within the past 3 months.  If less than 8, must be on file within the past 6 months.     Recent Labs   Lab Test  06/04/18   0815   A1C  6.8*            Passed - Diagnosis not CHF       Passed - Patient is age 18 or older       Passed - Patient is not pregnant       Passed - Patient has a normal serum Creatinine in the past 12 months    Recent Labs   Lab Test  06/04/18   0815   CR  0.84            Passed - Patient has not had a positive pregnancy test within the past 12 mos.       Passed - Recent (6 mo) or future (30 days) visit within the authorizing provider's specialty    Patient had office visit in the last 6 months or has a visit in the next 30 days with  "authorizing provider or within the authorizing provider's specialty.  See \"Patient Info\" tab in inbasket, or \"Choose Columns\" in Meds & Orders section of the refill encounter.            metFORMIN (GLUCOPHAGE-XR) 750 MG 24 hr tablet [Pharmacy Med Name: METFORMIN HCL  MG Tablet Extended Release 24 Hour] 180 tablet 3     Sig: TAKE 1 TABLET TWICE DAILY WITH MEALS    Biguanide Agents Passed    10/2/2018  5:44 PM       Passed - Blood pressure less than 140/90 in past 6 months    BP Readings from Last 3 Encounters:   06/04/18 102/62   05/21/18 124/60   05/10/18 120/60                Passed - Patient has documented LDL within the past 12 mos.    Recent Labs   Lab Test  06/04/18   0815   LDL  155*            Passed - Patient has had a Microalbumin in the past 15 mos.    Recent Labs   Lab Test  06/04/18   0922   MICROL  66   UMALCR  21.47            Passed - Patient is age 10 or older       Passed - Patient has documented A1c within the specified period of time.    If HgbA1C is 8 or greater, it needs to be on file within the past 3 months.  If less than 8, must be on file within the past 6 months.     Recent Labs   Lab Test  06/04/18   0815   A1C  6.8*            Passed - Patient's CR is NOT>1.4 OR Patient's EGFR is NOT<45 within past 12 mos.    Recent Labs   Lab Test  06/04/18   0815   GFRESTIMATED  66   GFRESTBLACK  79       Recent Labs   Lab Test  06/04/18   0815   CR  0.84            Passed - Patient does NOT have a diagnosis of CHF.       Passed - Patient is not pregnant       Passed - Patient has not had a positive pregnancy test within the past 12 mos.        Passed - Recent (6 mo) or future (30 days) visit within the authorizing provider's specialty    Patient had office visit in the last 6 months or has a visit in the next 30 days with authorizing provider or within the authorizing provider's specialty.  See \"Patient Info\" tab in inbasket, or \"Choose Columns\" in Meds & Orders section of the refill encounter.  "

## 2018-10-04 ENCOUNTER — OFFICE VISIT (OUTPATIENT)
Dept: OBGYN | Facility: OTHER | Age: 78
End: 2018-10-04
Payer: COMMERCIAL

## 2018-10-04 VITALS
DIASTOLIC BLOOD PRESSURE: 60 MMHG | SYSTOLIC BLOOD PRESSURE: 120 MMHG | WEIGHT: 162.5 LBS | HEART RATE: 84 BPM | BODY MASS INDEX: 28.3 KG/M2

## 2018-10-04 DIAGNOSIS — N89.8 VAGINAL ITCHING: ICD-10-CM

## 2018-10-04 DIAGNOSIS — N95.2 ATROPHIC VAGINITIS: ICD-10-CM

## 2018-10-04 DIAGNOSIS — Z91.030 ALLERGIC TO BEES: ICD-10-CM

## 2018-10-04 PROCEDURE — 99213 OFFICE O/P EST LOW 20 MIN: CPT | Performed by: OBSTETRICS & GYNECOLOGY

## 2018-10-04 RX ORDER — ESTRADIOL 0.1 MG/G
2 CREAM VAGINAL
Qty: 42.5 G | Refills: 3 | Status: SHIPPED | OUTPATIENT
Start: 2018-10-04 | End: 2019-09-29

## 2018-10-04 RX ORDER — TRIAMCINOLONE ACETONIDE 1 MG/G
OINTMENT TOPICAL
Qty: 30 G | Refills: 0 | Status: SHIPPED | OUTPATIENT
Start: 2018-10-04 | End: 2018-12-17

## 2018-10-04 NOTE — MR AVS SNAPSHOT
After Visit Summary   10/4/2018    Charlotte Snow    MRN: [id number]           Patient Information     Date Of Birth          1940        Visit Information        Provider Department      10/4/2018 9:00 AM Yaneth Linder MD [facility]        Today's Diagnoses     Vaginal itching        Atrophic vaginitis           Follow-ups after your visit        Your next 10 appointments already scheduled     Oct 22, 2018  1:30 PM CDT   New Visit with Nolvia Odell   [facility] ([address] [address] [phone]            Oct 22, 2018  2:00 PM CDT   New Visit with Juve Garcia MD   [facility] ([address] [address] [phone]              Who to contact     If you have questions or need follow up information about today's clinic visit or your schedule please contact [facility] [employer] directly at [phone].  Normal or non-critical lab and imaging results will be communicated to you by [employer], letter or phone within 4 business days after the clinic has received the results. If you do not hear from us within 7 days, please contact the clinic through [employer] or phone. If you have a critical or abnormal lab result, we will notify you by phone as soon as possible.  Submit refill requests through [email or web address] or call your pharmacy and they will forward the refill request to us. Please allow 3 business days for your refill to be completed.          Additional Information About Your Visit        [employer] Information     [email or web address] gives you secure access to your electronic health record. If you see a primary care provider, you can also send messages to your care team and make appointments. If you have questions, please call your primary care clinic.  If you do not have a primary care provider, please call [phone] and they will  assist you.        Care EveryWhere ID     This is your Care EveryWhere ID. This could be used by other organizations to access your [place] medical records  [id number]        Your Vitals Were     Pulse BMI (Body Mass Index)                84 28.3 kg/m2           Blood Pressure from Last 3 Encounters:   10/04/18 120/60   06/04/18 102/62   05/21/18 124/60    Weight from Last 3 Encounters:   10/04/18 162 lb 8 oz (73.7 kg)   06/04/18 158 lb 12.8 oz (72 kg)   05/21/18 165 lb 8 oz (75.1 kg)              Today, you had the following     No orders found for display         Today's Medication Changes          These changes are accurate as of 10/4/18  9:29 AM.  If you have any questions, ask your nurse or doctor.               These medicines have changed or have updated prescriptions.        Dose/Directions    estradiol 0.1 MG/GM cream   Commonly known as:  ESTRACE   This may have changed:  when to take this   Used for:  Atrophic vaginitis   Changed by:  Yaneth Linder MD        Dose:  2 g   Place 2 g vaginally twice a week   Quantity:  42.5 g   Refills:  3         Stop taking these medicines if you haven't already. Please contact your care team if you have questions.     traMADol 50 MG tablet   Commonly known as:  ULTRAM   Stopped by:  Yaneth Linder MD                Where to get your medicines      These medications were sent to [facility] [address]  [address], [facility] [address]     Phone:  [phone]     estradiol 0.1 MG/GM cream    triamcinolone 0.1 % ointment                Primary Care Provider Office Phone # Fax #    Aron Dillon Villa [phone]       [address] [place] [address]        Equal Access to Services     NAYAN SNELL AH: Radha Acuña, waflor guzmán, qaybta kaalmada sushant, tyrese davis. So [facility] [phone].    ATENCIÓN: Si jarvis chen merrill  disposición servicios gratuitos de asistencia lingüística. Claudette resendiz [phone].    We comply with applicable federal civil rights laws and [place] laws. We do not discriminate on the basis of race, color, national origin, age, disability, sex, sexual orientation, or gender identity.            Thank you!     Thank you for choosing [facility]  for your care. Our goal is always to provide you with excellent care. Hearing back from our patients is one way we can continue to improve our services. Please take a few minutes to complete the written survey that you may receive in the mail after your visit with us. Thank you!             Your Updated Medication List - Protect others around you: Learn how to safely use, store and throw away your medicines at www.disposemymeds.org.          This list is accurate as of 10/4/18  9:29 AM.  Always use your most recent med list.                   Brand Name Dispense Instructions for use Diagnosis    ACCU-CHEK COMPACT PLUS test strip   Generic drug:  blood glucose monitoring     100 strip    TEST 1-2 TIMES DAILY    Type 2 diabetes, HbA1C goal < 8% (H)       ACE/ARB/ARNI NOT PRESCRIBED (INTENTIONAL)      Please choose reason not prescribed, below    Type 2 diabetes mellitus with diabetic polyneuropathy, without long-term current use of insulin (H)       ACETAMINOPHEN EXTRA STRENGTH 500 MG tablet   Generic drug:  acetaminophen      Take 1-2 tablets (500-1,000 mg) by mouth every 6 hours as needed for mild pain        aspirin 81 MG tablet     100    1 TABLET DAILY    Type II or unspecified type diabetes mellitus without mention of complication, not stated as uncontrolled       BIOTIN 5000 5 MG Caps   Generic drug:  biotin     30 capsule    Take 1 capsule by mouth daily        blood glucose monitoring lancets     1 Box    Use to test blood sugar 1-2 times daily or as directed.    Type 2 diabetes, HbA1c goal < 7% (H)       CALCIUM 600 + D PO      Take 1 tablet by mouth  daily        EPINEPHrine 0.3 MG/0.3ML injection 2-pack    EPIPEN/ADRENACLICK/or ANY BX GENERIC EQUIV    1 each    Inject 0.3 mLs (0.3 mg) into the muscle once as needed for anaphylaxis    Allergic to bees       estradiol 0.1 MG/GM cream    ESTRACE    42.5 g    Place 2 g vaginally twice a week    Atrophic vaginitis       gabapentin 300 MG capsule    NEURONTIN    270 capsule    TAKE 1 CAPSULE THREE TIMES DAILY    Peripheral polyneuropathy       GLUCOSAMINE CHONDROITIN VIT D3 Caps      750/600/500 per capsule.  1 capsule by mouth twice daily.        metFORMIN 750 MG 24 hr tablet    GLUCOPHAGE-XR    180 tablet    TAKE 1 TABLET TWICE DAILY WITH MEALS    Type 2 diabetes mellitus with diabetic polyneuropathy, without long-term current use of insulin (H)       MULTI VIT/FL PO      1 TABLET DAILY        pioglitazone 30 MG tablet    ACTOS    90 tablet    TAKE 1 TABLET EVERY DAY    Type 2 diabetes mellitus with diabetic polyneuropathy, without long-term current use of insulin (H)       triamcinolone 0.1 % ointment    KENALOG    30 g    APPLY TO AFFECTED AREA SPARINGLY 3 TIMES A DAY FOR 14 DAYS    Vaginal itching       UROXATRAL 10 MG 24 hr tablet   Generic drug:  alfuzosin     30 tablet    Take 1 tablet (10 mg) by mouth daily    Urgency incontinence

## 2018-10-04 NOTE — NURSING NOTE
"Chief Complaint   Patient presents with     Follow Up For     bladder       Initial /60 (BP Location: Left arm, Patient Position: Chair, Cuff Size: Adult Regular)  Pulse 84  Wt 162 lb 8 oz (73.7 kg)  BMI 28.3 kg/m2 Estimated body mass index is 28.3 kg/(m^2) as calculated from the following:    Height as of 5/21/18: 5' 3.54\" (1.614 m).    Weight as of this encounter: 162 lb 8 oz (73.7 kg).  BP completed using cuff size: regular    No obstetric history on file.    The following HM Due: NONE      The following patient reported/Care Every where data was sent to:  P ABSTRACT QUALITY INITIATIVES [[id number]]       N/a    Maryellen Thorne, [facility]  October 4, 2018           "

## 2018-10-04 NOTE — PROGRESS NOTES
OB/GYN  10/4/2018      NAME:  Charlotte Snow  PCP:  [facility]  MRN:  [id number]    Impression / Plan     78 year old   with:      ICD-10-CM    1. Vaginal itching N89.8 triamcinolone (KENALOG) 0.1 % ointment   2. Atrophic vaginitis N95.2 estradiol (ESTRACE) 0.1 MG/GM cream     Discussed management options.  Her symptoms resolved with the Estrace cream, so she will restart that.  She requested the refill of Kenalog because she will be in [place] and would like that if needed.  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    Follow up with me when she returns from [place], sooner as needed      Chief Complaint     Chief Complaint   Patient presents with     Follow Up For     bladder       HPI     Charlotte Snow is a  78 year old female who is seen for follow up.      I last saw the patient May of 2018 for multiple concerns, including atrophic vaginitis, polypoid urethritis, rectocele, dyspareunia.  Rectocele was only mildly symptomatic, so the plan was observation for the time being.    I prescribed Vagifem tablets and plan was to continue the triamcinolone for her vulvar symptoms. This was changed to Estrace cream for insurance reasons.    Plan was to consider a higher potency steroid in the future if needed.  Plan was for her to follow-up in 6-8 weeks, sooner as needed.  Plan was to consider pelvic floor physical therapy referral for prolapse symptoms and dyspareunia at that time.    Since the treatment with the Estrace cream and the triamcinolone, the symptoms of the dyspareunia, itching, and burning resolved.  No pelvic pressure.  No symptoms from prolapse.  No bladder concerns or constipation. No bleeding or abnormal discharge.   Stopped the medications because she ran out over a month ago.   Also ran out of the alfuzosin 2-3 months ago, which was prescribed by the Urologist.      No LMP recorded. Patient has had a  hysterectomy.       Problem List     Patient Active Problem List    Diagnosis Date Noted     Left-sided low back pain with left-sided sciatica 06/27/2016     Priority: Medium     Nonallopathic lesion of sacroiliac region 06/27/2016     Priority: Medium     Somatic dysfunction of lumbar region 06/27/2016     Priority: Medium     Closed fracture of distal end of left radius, unspecified fracture morphology, initial encounter 04/08/2016     Priority: Medium     Type 2 diabetes mellitus with diabetic polyneuropathy (H) 06/09/2015     Priority: Medium     CKD (chronic kidney disease) stage 2, GFR 60-89 ml/min 05/29/2014     Priority: Medium     Peripheral neuropathy 11/11/2013     Priority: Medium     Toxic effect of toluene 10/01/2013     Priority: Medium     suspected source of her concerns for 1/Oct/2013       History of herpes zoster 09/14/2013     Priority: Medium     Shingles 08/03/2013     Priority: Medium     Microalbuminuria 06/20/2013     Priority: Medium     OA (osteoarthritis) of knee - right 01/29/2013     Priority: Medium     Constipation 12/26/2012     Priority: Medium     ACP (advance care planning) 05/17/2012     Priority: Medium     Advance Care Planning 9/22/2016: ACP Facilitation Session:    Charlotte Snow presented for ACP Facilitation session at a group session. She was accompanied by spouse. Honoring Choices information provided and resources reviewed. She currently wishes to give additional consideration to ACP  She currently has the following questions or concerns about Advance Care Planning: none known.  Confirmed/documented legally designated decision maker(s). Code status reflects choices in most recent ACP document. Added by Vivienne Boyer RN, Advance Care Planning Liaison.  Advance Care Planning 9/22/2016: Receipt of ACP document:  Received: POLST which was signed and dated by provider on 12/21/12.  Document previously scanned on 12/26/12.  Order reviewed and found to be valid.  Code  Status reflects choices in most recent ACP document.  Confirmed/documented designated decision maker(s).  Added by Vivienne Boyer RN, Advance Care Planning Liaison.  Advance Care Planning 9/22/2016: Receipt of ACP document:  Received: Health Care Directive which was witnessed or notarized on 5/4/1994.  Document previously scanned on 8/21/12.  Validation form completed and sent to be scanned.  Code Status reflects choices in most recent ACP document.  Confirmed/documented designated decision maker(s).  Added by Vivienne Boyer RN, Advance Care Planning Liaison.  Advance Care Planning 5/17/2012: Patient states has Advance Directive and will bring in a copy to clinic.          Degenerative arthritis of thumb 02/03/2009     Priority: Medium     right       Myalgia and myositis 11/18/2002     Priority: Medium     Problem list name updated by automated process. Provider to review       Hyperlipidemia LDL goal <100 10/31/2010     Priority: Low       Medications     Current Outpatient Prescriptions   Medication     ACCU-CHEK COMPACT PLUS test strip     acetaminophen (ACETAMINOPHEN EXTRA STRENGTH) 500 MG tablet     alfuzosin (UROXATRAL) 10 MG 24 hr tablet     ASPIRIN 81 MG OR TABS     biotin (BIOTIN 5000) 5 MG CAPS     blood glucose monitoring (ACCU-CHEK MULTICLIX) lancets     Calcium Carbonate-Vitamin D (CALCIUM 600 + D OR)     estradiol (ESTRACE) 0.1 MG/GM cream     gabapentin (NEURONTIN) 300 MG capsule     metFORMIN (GLUCOPHAGE-XR) 750 MG 24 hr tablet     Misc Natural Products (GLUCOSAMINE CHONDROITIN VIT D3) CAPS     MULTI VIT/FL OR     pioglitazone (ACTOS) 30 MG tablet     triamcinolone (KENALOG) 0.1 % ointment     ACE/ARB/ARNI NOT PRESCRIBED, INTENTIONAL,     EPINEPHrine (EPIPEN) 0.3 MG/0.3ML injection     No current facility-administered medications for this visit.         Allergies     Allergies   Allergen Reactions     Ace Inhibitors      Prinivil made her cough     Codeine      Losartan Potassium Hives      Cozaar     Sulfa Drugs Itching       ROS     A  organ review of systems was asked and the pertinent positives and negatives are listed in the HPI.     Physical Exam   Vitals: /60 (BP Location: Left arm, Patient Position: Chair, Cuff Size: Adult Regular)  Pulse 84  Wt 162 lb 8 oz (73.7 kg)  BMI 28.3 kg/m2    General: Comfortable, no obvious distress, normal  body habitus  : Low estrogen affect.  Urethral meatus normal.  Bulge noted at the hymen.  Erythema that was previously noted on the medial aspects of the labia bilaterally are no longer apparent.  No abnormal lesions.  No abnormal discharge.  Speculum exam reveals normal vaginal vault with no lesions or discharge.  Low estrogen effect.  Grade 3 rectocele persists.  Mild cystocele and mild vault prolapse.  No significant change in prolapse since her last visit.        15 minutes was spent with patient, more than 50% counseling and coordinating care    Yaneth Linder MD

## 2018-10-05 RX ORDER — EPINEPHRINE 0.3 MG/.3ML
INJECTION INTRAMUSCULAR
Qty: 0.6 ML | Refills: 3 | Status: SHIPPED | OUTPATIENT
Start: 2018-10-05

## 2018-10-05 NOTE — TELEPHONE ENCOUNTER
Routing refill request to provider for review/approval because:  A break in medication, has not been prescribed since 2014    BING MontanaN, RN  [facility]

## 2018-10-05 NOTE — TELEPHONE ENCOUNTER
"Requested Prescriptions   Pending Prescriptions Disp Refills     EPIPEN 2-HAYLEE 0.3 MG/0.3ML injection 2-pack [Pharmacy Med Name: EPIPEN 0.3MG/0.3ML FOR INJ]      Last Written Prescription Date:  12/30/14  Last Fill Quantity: 1,  # refills: 3   Last office visit: 11/12/2014 with prescribing provider:  6/4/18   Future Office Visit:     Sig: INJECT 0.3 MLS (0.3 MG) INTO THE MUSCLE ONCE AS NEEDED FOR ANAPHYLAXIS    Anaphylaxis Kits Protocol Passed    10/4/2018  1:53 PM       Passed - Recent (12 mo) or future (30 days) visit witin the authorizing provider's specialty    Patient had office visit in the last 12 months or has a visit in the next 30 days with authorizing provider or within the authorizing provider's specialty.  See \"Patient Info\" tab in inbasket, or \"Choose Columns\" in Meds & Orders section of the refill encounter.           Passed - Patient is age 5 or older        "

## 2018-10-09 ENCOUNTER — TELEPHONE (OUTPATIENT)
Dept: FAMILY MEDICINE | Facility: OTHER | Age: 78
End: 2018-10-09

## 2018-10-09 DIAGNOSIS — R30.0 DYSURIA: Primary | ICD-10-CM

## 2018-10-09 NOTE — TELEPHONE ENCOUNTER
Reason for Call: Request for an order or referral:    Order or referral being requested: Labs for UTI    Date needed: as soon as possible     Has the patient been seen by the PCP for this problem? YES    Additional comments: Patient calling and states she thinks she has a UTI and would like to come in and leave a urine sample. Patient states she is also experiencing some itching/discomfort so thinks she may also have vaginosis. Please advise.     Phone number Patient can be reached at:  Cell number on file:    Telephone Information:   Mobile [phone]       Best Time:  any    Can we leave a detailed message on this number?  YES    Call taken on 10/9/2018 at 7:38 AM by Sylvia Greene

## 2018-10-10 DIAGNOSIS — R30.0 DYSURIA: ICD-10-CM

## 2018-10-10 DIAGNOSIS — R82.90 NONSPECIFIC FINDING ON EXAMINATION OF URINE: Primary | ICD-10-CM

## 2018-10-10 LAB
ALBUMIN UR-MCNC: NEGATIVE MG/DL
APPEARANCE UR: CLEAR
BACTERIA #/AREA URNS HPF: ABNORMAL /HPF
BILIRUB UR QL STRIP: NEGATIVE
COLOR UR AUTO: YELLOW
GLUCOSE UR STRIP-MCNC: NEGATIVE MG/DL
HGB UR QL STRIP: NEGATIVE
KETONES UR STRIP-MCNC: ABNORMAL MG/DL
LEUKOCYTE ESTERASE UR QL STRIP: ABNORMAL
MUCOUS THREADS #/AREA URNS LPF: PRESENT /LPF
NITRATE UR QL: NEGATIVE
NON-SQ EPI CELLS #/AREA URNS LPF: ABNORMAL /LPF
PH UR STRIP: 6.5 PH (ref 5–7)
RBC #/AREA URNS AUTO: ABNORMAL /HPF
SOURCE: ABNORMAL
SP GR UR STRIP: 1.01 (ref 1–1.03)
UROBILINOGEN UR STRIP-ACNC: 0.2 EU/DL (ref 0.2–1)
WBC #/AREA URNS AUTO: ABNORMAL /HPF

## 2018-10-10 PROCEDURE — 87086 URINE CULTURE/COLONY COUNT: CPT | Performed by: INTERNAL MEDICINE

## 2018-10-10 PROCEDURE — 81001 URINALYSIS AUTO W/SCOPE: CPT | Performed by: INTERNAL MEDICINE

## 2018-10-10 NOTE — PROGRESS NOTES
Dear Charlotte, your recent test results are attached.    The urine sample is unremarkable.    Feel free to contact me via the office or My Chart if you have any questions regarding the above.  Sincerely,  DO ALPHONSE ThompsonOI

## 2018-10-11 LAB
BACTERIA SPEC CULT: NORMAL
Lab: NORMAL
SPECIMEN SOURCE: NORMAL

## 2018-10-12 ENCOUNTER — TRANSFERRED RECORDS (OUTPATIENT)
Dept: HEALTH INFORMATION MANAGEMENT | Facility: CLINIC | Age: 78
End: 2018-10-12
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
## 2018-10-22 ENCOUNTER — OFFICE VISIT (OUTPATIENT)
Dept: OTOLARYNGOLOGY | Facility: CLINIC | Age: 78
End: 2018-10-22
Payer: COMMERCIAL

## 2018-10-22 VITALS
DIASTOLIC BLOOD PRESSURE: 62 MMHG | BODY MASS INDEX: 29.25 KG/M2 | HEART RATE: 85 BPM | SYSTOLIC BLOOD PRESSURE: 118 MMHG | OXYGEN SATURATION: 95 % | WEIGHT: 168 LBS

## 2018-10-22 DIAGNOSIS — H92.01 OTALGIA, RIGHT: ICD-10-CM

## 2018-10-22 DIAGNOSIS — M26.609 TMJ (TEMPOROMANDIBULAR JOINT SYNDROME): ICD-10-CM

## 2018-10-22 DIAGNOSIS — H90.3 SENSORINEURAL HEARING LOSS (SNHL) OF BOTH EARS: Primary | ICD-10-CM

## 2018-10-22 PROCEDURE — 99213 OFFICE O/P EST LOW 20 MIN: CPT | Performed by: OTOLARYNGOLOGY

## 2018-10-22 NOTE — MR AVS SNAPSHOT
After Visit Summary   10/22/2018    Charlotte Snow    MRN: [id number]           Patient Information     Date Of Birth          1940        Visit Information        Provider Department      10/22/2018 2:00 PM Juve Garcia MD [facility]        Today's Diagnoses     Sensorineural hearing loss (SNHL) of both ears    -  1    TMJ (temporomandibular joint syndrome)          Care Instructions    Due to the TMJ disorder, I would recommend that you see a dentist to further evaluate.       Understanding Temporomandibular Disorders (TMD)    Do you have pain in your face, jaw, or teeth? Do you have trouble chewing? Does your jaw make clicking or popping noises? These symptoms can be caused by temporomandibular disorders (TMD). This term describes a group of problems related to the temporomandibular joint (TMJ) and nearby muscles. Your symptoms may be painful and frustrating. But don t worry. Your healthcare team can help you treat TMD and prevent future problems.  What s wrong?  TMD causes many kinds of symptoms. That s part of the reason it can be hard to diagnose. You may have headaches, tooth pain, or muscle aches. Your pain may be constant. Or it may come and go without any apparent reason. TMD-related problems include:    Tight muscles    Joint inflammation    Joint damage    Teeth grinding or clenching  What can you do?  If you are having TMD symptoms, don t wait. Call your dentist or healthcare provider right away. You don t have to live with pain or discomfort. TMD can be treated. In fact, a key part of treatment is learning to manage your condition at home.  Which treatment is right for you?  Treatment helps rest the muscles and joint. It also helps relieve symptoms and restore function. Depending on the type of problem you have, your treatment plan may include:    Short-term (temporary) diet changes    New habits for managing stress and maintaining the health of your  jaw    Medicine to reduce pain and inflammation    Therapy to reduce pressure on the joint and restore function    Dental treatment to reduce pressure on the joint  How can you avoid future problems?  Treatment can help relieve your current condition. But TMD symptoms may return over time. You can avoid future problems by maintaining the health of your jaw:    Stay away foods and habits that make your symptoms worse.    Lower the stress level in your life.    Follow your treatment plan.    Pay attention to your body and get help if symptoms return.  Date Last Reviewed: 8/1/2017 2000-2017 [employer]. [address] [place] [address]. All rights reserved. This information is not intended as a substitute for professional medical care. Always follow your healthcare professional's instructions.                Follow-ups after your visit        Additional Services     AUDIOLOGY ADULT REFERRAL                 Who to contact     If you have questions or need follow up information about today's clinic visit or your schedule please contact [facility] directly at [phone].  Normal or non-critical lab and imaging results will be communicated to you by [employer], letter or phone within 4 business days after the clinic has received the results. If you do not hear from us within 7 days, please contact the clinic through [employer] or phone. If you have a critical or abnormal lab result, we will notify you by phone as soon as possible.  Submit refill requests through [employer] or call your pharmacy and they will forward the refill request to us. Please allow 3 business days for your refill to be completed.          Additional Information About Your Visit        [employer] Information     [employer] gives you secure access to your electronic health record. If you see a primary care provider, you can also send messages to your care team and make appointments. If you have questions, please call your  primary care clinic.  If you do not have a primary care provider, please call [phone] and they will assist you.        Care EveryWhere ID     This is your Care EveryWhere ID. This could be used by other organizations to access your [place] medical records  [id number]        Your Vitals Were     Pulse Pulse Oximetry BMI (Body Mass Index)             85 95% 29.25 kg/m2          Blood Pressure from Last 3 Encounters:   10/22/18 118/62   10/04/18 120/60   06/04/18 102/62    Weight from Last 3 Encounters:   10/22/18 76.2 kg (168 lb)   10/04/18 73.7 kg (162 lb 8 oz)   06/04/18 72 kg (158 lb 12.8 oz)              We Performed the Following     AUDIOLOGY ADULT REFERRAL        Primary Care Provider Office Phone # Fax #    Aron Villa,  [phone]       [address] [place] [address]        Equal Access to Services     NAYAN SNELL : Hadii aad ku hadasho Soomaali, waaxda luqadaha, qaybta kaalmada adeegyada, waxay idiin hayaan autumn leroy . So [facility] [phone].    ATENCIÓN: Si habla español, tiene a merrill disposición servicios gratuitos de asistencia lingüística. Llame al [phone].    We comply with applicable federal civil rights laws and [place] laws. We do not discriminate on the basis of race, color, national origin, age, disability, sex, sexual orientation, or gender identity.            Thank you!     Thank you for choosing [facility]  for your care. Our goal is always to provide you with excellent care. Hearing back from our patients is one way we can continue to improve our services. Please take a few minutes to complete the written survey that you may receive in the mail after your visit with us. Thank you!             Your Updated Medication List - Protect others around you: Learn how to safely use, store and throw away your medicines at www.disposemymeds.org.          This list is accurate as of 10/22/18  3:13 PM.  Always use your most  recent med list.                   Brand Name Dispense Instructions for use Diagnosis    ACCU-CHEK COMPACT PLUS test strip   Generic drug:  blood glucose monitoring     100 strip    TEST 1-2 TIMES DAILY    Type 2 diabetes, HbA1C goal < 8% (H)       ACE/ARB/ARNI NOT PRESCRIBED (INTENTIONAL)      Please choose reason not prescribed, below    Type 2 diabetes mellitus with diabetic polyneuropathy, without long-term current use of insulin (H)       ACETAMINOPHEN EXTRA STRENGTH 500 MG tablet   Generic drug:  acetaminophen      Take 1-2 tablets (500-1,000 mg) by mouth every 6 hours as needed for mild pain        aspirin 81 MG tablet     100    1 TABLET DAILY    Type II or unspecified type diabetes mellitus without mention of complication, not stated as uncontrolled       BIOTIN 5000 5 MG Caps   Generic drug:  biotin     30 capsule    Take 1 capsule by mouth daily        blood glucose monitoring lancets     1 Box    Use to test blood sugar 1-2 times daily or as directed.    Type 2 diabetes, HbA1c goal < 7% (H)       CALCIUM 600 + D PO      Take 1 tablet by mouth daily        * EPINEPHrine 0.3 MG/0.3ML injection 2-pack    EPIPEN/ADRENACLICK/or ANY BX GENERIC EQUIV    1 each    Inject 0.3 mLs (0.3 mg) into the muscle once as needed for anaphylaxis    Allergic to bees       * EPIPEN 2-HAYLEE 0.3 MG/0.3ML injection 2-pack   Generic drug:  EPINEPHrine     0.6 mL    INJECT 0.3 MLS (0.3 MG) INTO THE MUSCLE ONCE AS NEEDED FOR ANAPHYLAXIS    Allergic to bees       estradiol 0.1 MG/GM cream    ESTRACE    42.5 g    Place 2 g vaginally twice a week    Atrophic vaginitis       gabapentin 300 MG capsule    NEURONTIN    270 capsule    TAKE 1 CAPSULE THREE TIMES DAILY    Peripheral polyneuropathy       GLUCOSAMINE CHONDROITIN VIT D3 Caps      750/600/500 per capsule.  1 capsule by mouth twice daily.        metFORMIN 750 MG 24 hr tablet    GLUCOPHAGE-XR    180 tablet    TAKE 1 TABLET TWICE DAILY WITH MEALS    Type 2 diabetes mellitus with  diabetic polyneuropathy, without long-term current use of insulin (H)       MULTI VIT/FL PO      1 TABLET DAILY        pioglitazone 30 MG tablet    ACTOS    90 tablet    TAKE 1 TABLET EVERY DAY    Type 2 diabetes mellitus with diabetic polyneuropathy, without long-term current use of insulin (H)       triamcinolone 0.1 % ointment    KENALOG    30 g    APPLY TO AFFECTED AREA SPARINGLY 3 TIMES A DAY FOR 14 DAYS    Vaginal itching       UROXATRAL 10 MG 24 hr tablet   Generic drug:  alfuzosin     30 tablet    Take 1 tablet (10 mg) by mouth daily    Urgency incontinence       * Notice:  This list has 2 medication(s) that are the same as other medications prescribed for you. Read the directions carefully, and ask your doctor or other care provider to review them with you.

## 2018-10-22 NOTE — PATIENT INSTRUCTIONS
Due to the TMJ disorder, I would recommend that you see a dentist to further evaluate.       Understanding Temporomandibular Disorders (TMD)    Do you have pain in your face, jaw, or teeth? Do you have trouble chewing? Does your jaw make clicking or popping noises? These symptoms can be caused by temporomandibular disorders (TMD). This term describes a group of problems related to the temporomandibular joint (TMJ) and nearby muscles. Your symptoms may be painful and frustrating. But don t worry. Your healthcare team can help you treat TMD and prevent future problems.  What s wrong?  TMD causes many kinds of symptoms. That s part of the reason it can be hard to diagnose. You may have headaches, tooth pain, or muscle aches. Your pain may be constant. Or it may come and go without any apparent reason. TMD-related problems include:    Tight muscles    Joint inflammation    Joint damage    Teeth grinding or clenching  What can you do?  If you are having TMD symptoms, don t wait. Call your dentist or healthcare provider right away. You don t have to live with pain or discomfort. TMD can be treated. In fact, a key part of treatment is learning to manage your condition at home.  Which treatment is right for you?  Treatment helps rest the muscles and joint. It also helps relieve symptoms and restore function. Depending on the type of problem you have, your treatment plan may include:    Short-term (temporary) diet changes    New habits for managing stress and maintaining the health of your jaw    Medicine to reduce pain and inflammation    Therapy to reduce pressure on the joint and restore function    Dental treatment to reduce pressure on the joint  How can you avoid future problems?  Treatment can help relieve your current condition. But TMD symptoms may return over time. You can avoid future problems by maintaining the health of your jaw:    Stay away foods and habits that make your symptoms worse.    Lower the stress  level in your life.    Follow your treatment plan.    Pay attention to your body and get help if symptoms return.  Date Last Reviewed: 8/1/2017 2000-2017 [employer]. [address] [place] [address], [place] [address]. All rights reserved. This information is not intended as a substitute for professional medical care. Always follow your healthcare professional's instructions.
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
## 2018-10-23 ENCOUNTER — TELEPHONE (OUTPATIENT)
Dept: FAMILY MEDICINE | Facility: OTHER | Age: 78
End: 2018-10-23
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
## 2018-10-23 NOTE — TELEPHONE ENCOUNTER
Pt notified that this has been done. I have brought the form to the . Ratna Deleon CMA ([facility])

## 2018-12-17 ENCOUNTER — TELEPHONE (OUTPATIENT)
Dept: OBGYN | Facility: OTHER | Age: 78
End: 2018-12-17

## 2018-12-17 DIAGNOSIS — N89.8 VAGINAL ITCHING: ICD-10-CM

## 2018-12-17 RX ORDER — TRIAMCINOLONE ACETONIDE 1 MG/G
OINTMENT TOPICAL
Qty: 30 G | Refills: 1 | Status: SHIPPED | OUTPATIENT
Start: 2018-12-17 | End: 2023-03-27

## 2018-12-17 NOTE — TELEPHONE ENCOUNTER
"Reason for Call:  Medication or medication refill:    Do you use a [facility]?  Name of the pharmacy and phone number for the current request:  [facility]    Name of the medication requested: Kenalog    Other request: Patient needs refill sent. Please call patient If there are any issues. Please fax request to\" [phone]    Can we leave a detailed message on this number? YES    Phone number patient can be reached at: Cell number on file:    Telephone Information:   Mobile [phone]       Best Time: any    Call taken on 12/17/2018 at 2:59 PM by Vadim Nelson      "

## 2019-04-02 ENCOUNTER — TELEPHONE (OUTPATIENT)
Dept: FAMILY MEDICINE | Facility: OTHER | Age: 79
End: 2019-04-02

## 2019-04-02 DIAGNOSIS — B37.31 YEAST VAGINITIS: Primary | ICD-10-CM

## 2019-04-02 RX ORDER — FLUCONAZOLE 150 MG/1
TABLET ORAL
Qty: 4 TABLET | Refills: 0 | Status: SHIPPED | OUTPATIENT
Start: 2019-04-02 | End: 2019-04-22

## 2019-04-02 NOTE — TELEPHONE ENCOUNTER
Reason for Call:  Medication or medication refill:    Do you use a [facility]?  Name of the pharmacy and phone number for the current request:  [facility], [address] [address] - Phone:[phone]    Name of the medication requested: medication to treat yeast infection - not a cream    Other request: Charlotte was put on antibiotics 2 times due to a tooth abscess, extraction and future implant, she is asking if she can get a prescription for something to treat her yeast infection, she said she has a cream but it's not working and she is asking for something else. Charlotte is currently in [place] and asking the medication to be sent to [place].    Can we leave a detailed message on this number? YES    Phone number patient can be reached at: Cell number on file:    Telephone Information:   Mobile [phone]       Best Time:     Call taken on 4/2/2019 at 9:48 AM by Coni Layne

## 2019-04-08 DIAGNOSIS — E11.42 TYPE 2 DIABETES MELLITUS WITH DIABETIC POLYNEUROPATHY, WITHOUT LONG-TERM CURRENT USE OF INSULIN (H): ICD-10-CM

## 2019-04-10 RX ORDER — METFORMIN HYDROCHLORIDE 750 MG/1
750 TABLET, EXTENDED RELEASE ORAL 2 TIMES DAILY WITH MEALS
Qty: 60 TABLET | Refills: 0 | Status: SHIPPED | OUTPATIENT
Start: 2019-04-10 | End: 2019-04-22

## 2019-04-10 RX ORDER — PIOGLITAZONEHYDROCHLORIDE 30 MG/1
30 TABLET ORAL DAILY
Qty: 30 TABLET | Refills: 0 | Status: SHIPPED | OUTPATIENT
Start: 2019-04-10 | End: 2019-04-22

## 2019-04-10 NOTE — TELEPHONE ENCOUNTER
"Requested Prescriptions   Pending Prescriptions Disp Refills     pioglitazone (ACTOS) 30 MG tablet [Pharmacy Med Name: PIOGLITAZONE HCL 30 MG Tablet] 90 tablet 1     Sig: TAKE 1 TABLET EVERY DAY   Last Written Prescription Date:  10/3/18  Last Fill Quantity: 90,  # refills: 1   Last office visit: 6/4/2018 with prescribing provider:     Future Office Visit:        Thiazolidinedione Agents (TZDs)  Failed - 4/8/2019  6:52 PM        Failed - Patient has a normal ALT within the past 12 mos.     Recent Labs   Lab Test 08/18/14  0759   ALT 28             Failed - Patient has a normal AST within the past 12 mos.      Recent Labs   Lab Test 07/23/12  0919   AST 31             Failed - Patient has documented A1c within the specified period of time.     If HgbA1C is 8 or greater, it needs to be on file within the past 3 months.  If less than 8, must be on file within the past 6 months.     Recent Labs   Lab Test 06/04/18  0815   A1C 6.8*             Failed - Recent (6 mo) or future (30 days) visit within the authorizing provider's specialty     Patient had office visit in the last 6 months or has a visit in the next 30 days with authorizing provider or within the authorizing provider's specialty.  See \"Patient Info\" tab in inbasket, or \"Choose Columns\" in Meds & Orders section of the refill encounter.            Passed - Blood pressure less than 140/90 in past 6 months     BP Readings from Last 3 Encounters:   10/22/18 118/62   10/04/18 120/60   06/04/18 102/62                 Passed - Patient has documented LDL within the past 12 mos.     Recent Labs   Lab Test 06/04/18  0815   *             Passed - Patient has had a Microalbumin in the past 12 mos.     Recent Labs   Lab Test 06/04/18  0922   MICROL 66   UMALCR 21.47             Passed - Diagnosis not CHF        Passed - Medication is active on med list        Passed - Patient is age 18 or older        Passed - Patient is not pregnant        Passed - Patient has a " "normal serum Creatinine in the past 12 months     Recent Labs   Lab Test 06/04/18  0815   CR 0.84             Passed - Patient has not had a positive pregnancy test within the past 12 mos.     Routing refill request to provider for review/approval because:  Labs not current:  ALT, AST, A1c  T'd up 1 month for provider review.                 metFORMIN (GLUCOPHAGE-XR) 750 MG 24 hr tablet [Pharmacy Med Name: METFORMIN HYDROCHLORIDE  MG Tablet Extended Release 24 Hour] 180 tablet 1     Sig: TAKE 1 TABLET TWICE DAILY WITH MEALS   Last Written Prescription Date:  10/3/18  Last Fill Quantity: 180,  # refills: 1   Last office visit: 6/4/2018 with prescribing provider:     Future Office Visit:        Biguanide Agents Failed - 4/8/2019  6:52 PM        Failed - Patient has documented A1c within the specified period of time.     If HgbA1C is 8 or greater, it needs to be on file within the past 3 months.  If less than 8, must be on file within the past 6 months.     Recent Labs   Lab Test 06/04/18  0815   A1C 6.8*             Failed - Recent (6 mo) or future (30 days) visit within the authorizing provider's specialty     Patient had office visit in the last 6 months or has a visit in the next 30 days with authorizing provider or within the authorizing provider's specialty.  See \"Patient Info\" tab in inbasket, or \"Choose Columns\" in Meds & Orders section of the refill encounter.            Passed - Blood pressure less than 140/90 in past 6 months     BP Readings from Last 3 Encounters:   10/22/18 118/62   10/04/18 120/60   06/04/18 102/62                 Passed - Patient has documented LDL within the past 12 mos.     Recent Labs   Lab Test 06/04/18  0815   *             Passed - Patient has had a Microalbumin in the past 15 mos.     Recent Labs   Lab Test 06/04/18  0922   MICROL 66   UMALCR 21.47             Passed - Patient is age 10 or older        Passed - Patient's CR is NOT>1.4 OR Patient's EGFR is NOT<45 " within past 12 mos.     Recent Labs   Lab Test 06/04/18  0815   GFRESTIMATED 66   GFRESTBLACK 79       Recent Labs   Lab Test 06/04/18  0815   CR 0.84             Passed - Patient does NOT have a diagnosis of CHF.        Passed - Medication is active on med list        Passed - Patient is not pregnant        Passed - Patient has not had a positive pregnancy test within the past 12 mos.         Routing refill request to provider for review/approval because:  Labs not current:  A1C  Patient needs to be seen because:  > 6 months since last diabetes visit.  T'd up 1 month for provider review.    Will forward to schedulers to schedule patient for OV.  Maryellen Rudd RN

## 2019-04-22 ENCOUNTER — OFFICE VISIT (OUTPATIENT)
Dept: FAMILY MEDICINE | Facility: OTHER | Age: 79
End: 2019-04-22
Payer: COMMERCIAL

## 2019-04-22 VITALS
TEMPERATURE: 97.4 F | RESPIRATION RATE: 18 BRPM | DIASTOLIC BLOOD PRESSURE: 64 MMHG | WEIGHT: 160.9 LBS | BODY MASS INDEX: 25.86 KG/M2 | HEIGHT: 66 IN | OXYGEN SATURATION: 97 % | HEART RATE: 74 BPM | SYSTOLIC BLOOD PRESSURE: 122 MMHG

## 2019-04-22 DIAGNOSIS — E78.5 HYPERLIPIDEMIA LDL GOAL <100: ICD-10-CM

## 2019-04-22 DIAGNOSIS — E11.42 TYPE 2 DIABETES MELLITUS WITH DIABETIC POLYNEUROPATHY, WITHOUT LONG-TERM CURRENT USE OF INSULIN (H): Primary | ICD-10-CM

## 2019-04-22 LAB
ANION GAP SERPL CALCULATED.3IONS-SCNC: 6 MMOL/L (ref 3–14)
BUN SERPL-MCNC: 22 MG/DL (ref 7–30)
CALCIUM SERPL-MCNC: 8.7 MG/DL (ref 8.5–10.1)
CHLORIDE SERPL-SCNC: 107 MMOL/L (ref 94–109)
CHOLEST SERPL-MCNC: 234 MG/DL
CO2 SERPL-SCNC: 28 MMOL/L (ref 20–32)
CREAT SERPL-MCNC: 0.77 MG/DL (ref 0.52–1.04)
GFR SERPL CREATININE-BSD FRML MDRD: 74 ML/MIN/{1.73_M2}
GLUCOSE SERPL-MCNC: 140 MG/DL (ref 70–99)
HBA1C MFR BLD: 7.2 % (ref 0–5.6)
HDLC SERPL-MCNC: 69 MG/DL
LDLC SERPL CALC-MCNC: 144 MG/DL
NONHDLC SERPL-MCNC: 165 MG/DL
POTASSIUM SERPL-SCNC: 4.1 MMOL/L (ref 3.4–5.3)
SODIUM SERPL-SCNC: 141 MMOL/L (ref 133–144)
TRIGL SERPL-MCNC: 103 MG/DL
TSH SERPL DL<=0.005 MIU/L-ACNC: 2.86 MU/L (ref 0.4–4)

## 2019-04-22 PROCEDURE — 83036 HEMOGLOBIN GLYCOSYLATED A1C: CPT | Performed by: INTERNAL MEDICINE

## 2019-04-22 PROCEDURE — 80048 BASIC METABOLIC PNL TOTAL CA: CPT | Performed by: INTERNAL MEDICINE

## 2019-04-22 PROCEDURE — 36415 COLL VENOUS BLD VENIPUNCTURE: CPT | Performed by: INTERNAL MEDICINE

## 2019-04-22 PROCEDURE — 99213 OFFICE O/P EST LOW 20 MIN: CPT | Performed by: INTERNAL MEDICINE

## 2019-04-22 PROCEDURE — 84443 ASSAY THYROID STIM HORMONE: CPT | Performed by: INTERNAL MEDICINE

## 2019-04-22 PROCEDURE — 80061 LIPID PANEL: CPT | Performed by: INTERNAL MEDICINE

## 2019-04-22 RX ORDER — METFORMIN HYDROCHLORIDE 750 MG/1
750 TABLET, EXTENDED RELEASE ORAL 2 TIMES DAILY WITH MEALS
Qty: 180 TABLET | Refills: 0 | Status: SHIPPED | OUTPATIENT
Start: 2019-04-22 | End: 2019-06-29

## 2019-04-22 RX ORDER — PIOGLITAZONEHYDROCHLORIDE 30 MG/1
30 TABLET ORAL DAILY
Qty: 90 TABLET | Refills: 0 | Status: SHIPPED | OUTPATIENT
Start: 2019-04-22 | End: 2019-06-29

## 2019-04-22 ASSESSMENT — MIFFLIN-ST. JEOR: SCORE: 1223.65

## 2019-04-22 ASSESSMENT — PAIN SCALES - GENERAL: PAINLEVEL: MILD PAIN (3)
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
## 2019-04-23 ENCOUNTER — ANCILLARY PROCEDURE (OUTPATIENT)
Dept: MAMMOGRAPHY | Facility: OTHER | Age: 79
End: 2019-04-23
Attending: INTERNAL MEDICINE
Payer: COMMERCIAL

## 2019-04-23 DIAGNOSIS — Z12.31 VISIT FOR SCREENING MAMMOGRAM: ICD-10-CM

## 2019-04-23 PROCEDURE — 77067 SCR MAMMO BI INCL CAD: CPT | Mod: TC

## 2019-04-29 NOTE — RESULT ENCOUNTER NOTE
Dear Charlotte, your recent test results are attached.    Thyroid is well adjusted  The cholesterol slightly elevated with LDL of 144 but this has improved slightly.  Chemistry panel shows a slightly elevated blood sugar of 140.  Hemoglobin A1c is stable at 7.2 suggesting adequate overall blood sugar control  Feel free to contact me via the office or My Chart if you have any questions regarding the above.  Sincerely,  DO JT Thompson

## 2019-05-16 ENCOUNTER — TRANSFERRED RECORDS (OUTPATIENT)
Dept: HEALTH INFORMATION MANAGEMENT | Facility: CLINIC | Age: 79
End: 2019-05-16

## 2019-06-27 ENCOUNTER — ANCILLARY PROCEDURE (OUTPATIENT)
Dept: GENERAL RADIOLOGY | Facility: OTHER | Age: 79
End: 2019-06-27
Attending: PHYSICIAN ASSISTANT
Payer: COMMERCIAL

## 2019-06-27 ENCOUNTER — TELEPHONE (OUTPATIENT)
Dept: FAMILY MEDICINE | Facility: OTHER | Age: 79
End: 2019-06-27

## 2019-06-27 ENCOUNTER — OFFICE VISIT (OUTPATIENT)
Dept: FAMILY MEDICINE | Facility: OTHER | Age: 79
End: 2019-06-27
Payer: COMMERCIAL

## 2019-06-27 VITALS
DIASTOLIC BLOOD PRESSURE: 74 MMHG | RESPIRATION RATE: 16 BRPM | BODY MASS INDEX: 26.79 KG/M2 | SYSTOLIC BLOOD PRESSURE: 118 MMHG | OXYGEN SATURATION: 97 % | WEIGHT: 163.5 LBS | TEMPERATURE: 96.4 F | HEART RATE: 72 BPM

## 2019-06-27 DIAGNOSIS — M79.672 LEFT FOOT PAIN: Primary | ICD-10-CM

## 2019-06-27 DIAGNOSIS — M79.672 LEFT FOOT PAIN: ICD-10-CM

## 2019-06-27 PROCEDURE — 73630 X-RAY EXAM OF FOOT: CPT | Mod: LT

## 2019-06-27 PROCEDURE — 99213 OFFICE O/P EST LOW 20 MIN: CPT | Performed by: PHYSICIAN ASSISTANT

## 2019-06-27 ASSESSMENT — PAIN SCALES - GENERAL: PAINLEVEL: MILD PAIN (3)

## 2019-06-27 NOTE — TELEPHONE ENCOUNTER
I have an 1140 doctor only that the patient may use. If she chooses to do so, please ask that she arrives 10-15 minutes early so that we can xray the toe prior to the appointment.     Dillon Davila PA-C on 6/27/2019 at 9:52 AM

## 2019-06-27 NOTE — NURSING NOTE
Health Maintenance Due   Topic Date Due     MEDICARE ANNUAL WELLNESS VISIT  11/09/2013     ZOSTER IMMUNIZATION (2 of 3) 02/06/2014     DTAP/TDAP/TD IMMUNIZATION (3 - Td) 04/20/2019     EMMETT ASSESSMENT  05/10/2019     PHQ-9  05/21/2019     MICROALBUMIN  06/04/2019     DIABETIC FOOT EXAM  06/04/2019     Carmen DENSON LPN    Patient declines the depression and anxiety questions.

## 2019-06-27 NOTE — TELEPHONE ENCOUNTER
Spoke with patient and informed of message below. Appointment was made for 11:40.     Victoria Wyatt MA

## 2019-06-27 NOTE — TELEPHONE ENCOUNTER
Reason for Call:  Same Day Appointment, Requested Provider:  Aron Villa DO    PCP: Aron Villa    Reason for visit: toe injury    Duration of symptoms: 1 day    Have you been treated for this in the past? No    Additional comments: Charlotte hooked her toe on a chair last night, she would like to be seen today to check if it's broken, can she be worked into your schedule today    Can we leave a detailed message on this number? YES    Phone number patient can be reached at: Home number on file [phone] (home) or Cell number on file:    Telephone Information:   Mobile [phone]       Best Time: anytime    Call taken on 6/27/2019 at 7:11 AM by Coni Layne

## 2019-06-27 NOTE — PROGRESS NOTES
Subjective     Charlotte Snow is a 79 year old female who presents to clinic today for the following health issues:    HPI   Concern - toe injury   Onset: Happened on 6-    Description:   Toe injury    Intensity: moderate    Progression of Symptoms:  improving    Accompanying Signs & Symptoms:  none    Previous history of similar problem:   YES    Precipitating factors:   Worsened by: walking    Alleviating factors:  Improved by: wrapping    Therapies Tried and outcome: wrapping the foot; good relief      Patient is a 79 year old female who presents with her  today due to pain and floppy toes on the left foot. She said that she heard the phone ring last night and while trying to answer it caught her left 4th and 5th toes on the leg of a chair. Since the injury she has been unable to move distal the toes and they have been painful to walk on. Denies numbness, tingling or discoloration.       Reviewed and updated as needed this visit by Provider         Review of Systems   ROS COMP: Constitutional, HEENT, cardiovascular, pulmonary, gi and gu systems are negative, except as otherwise noted.      Objective    /74 (BP Location: Left arm, Patient Position: Chair, Cuff Size: Adult Large)   Pulse 72   Temp 96.4  F (35.8  C) (Oral)   Resp 16   Wt 74.2 kg (163 lb 8 oz)   SpO2 97%   BMI 26.79 kg/m    Body mass index is 26.79 kg/m .  Physical Exam   GENERAL: healthy, alert and no distress  RESP: lungs clear to auscultation - no rales, rhonchi or wheezes  CV: regular rate and rhythm, normal S1 S2, no S3 or S4, no murmur, click or rub, no peripheral edema and peripheral pulses strong  MS: no gross musculoskeletal defects noted, no edema, tenderness to the 4th and 5th left toes, no discoloration  NEURO: Normal strength and tone, mentation intact and speech normal    Diagnostic Test Results:  Labs reviewed in Epic  Xray - Initial impression is concerning for possible ligament tear over 4th and 5th PIP  joints vs dislocation.         Assessment & Plan     1. Left foot pain  Discussed imaging with PCP who was more concerned for ligament tear vs dislocation. Recommended miguel angel tape and rigid walking shoe with podiatry follow up. Patient was in agreement this plan. Advised maintaining stability over toes to minimize stress over ligaments.   - XR Foot Left G/E 3 Views; Future  - PODIATRY/FOOT & ANKLE SURGERY REFERRAL     Follow up with clinic for annual checkup or sooner if conditions change, worsen or fail to improve as expected.      No follow-ups on file.    Dillon Davila PA-C  [facility]

## 2019-06-29 DIAGNOSIS — E11.42 TYPE 2 DIABETES MELLITUS WITH DIABETIC POLYNEUROPATHY, WITHOUT LONG-TERM CURRENT USE OF INSULIN (H): ICD-10-CM

## 2019-07-01 RX ORDER — PIOGLITAZONEHYDROCHLORIDE 30 MG/1
TABLET ORAL
Qty: 60 TABLET | Refills: 0 | Status: SHIPPED | OUTPATIENT
Start: 2019-07-01 | End: 2019-10-01

## 2019-07-01 RX ORDER — METFORMIN HYDROCHLORIDE 750 MG/1
TABLET, EXTENDED RELEASE ORAL
Qty: 120 TABLET | Refills: 0 | Status: SHIPPED | OUTPATIENT
Start: 2019-07-01 | End: 2019-10-01

## 2019-07-01 NOTE — TELEPHONE ENCOUNTER
Requested Prescriptions   Pending Prescriptions Disp Refills     pioglitazone (ACTOS) 30 MG tablet [Pharmacy Med Name: PIOGLITAZONE HCL 30 MG Tablet] 60 tablet 0     Sig: TAKE 1 TABLET EVERY DAY   Last Written Prescription Date:  4/22/2019  Last Fill Quantity: 90,  # refills: 0   Last office visit: 4/22/2019 with prescribing provider:     Future Office Visit:        Thiazolidinedione Agents (TZDs)  Failed - 6/29/2019  7:35 PM        Failed - Patient has a normal ALT within the past 12 mos.     Recent Labs   Lab Test 08/18/14  0759   ALT 28           Failed - Patient has a normal AST within the past 12 mos.      Recent Labs   Lab Test 07/23/12  0919   AST 31           Passed - Blood pressure less than 140/90 in past 6 months     BP Readings from Last 3 Encounters:   06/27/19 118/74   04/22/19 122/64   10/22/18 118/62           Passed - Patient has documented LDL within the past 12 mos.     Recent Labs   Lab Test 04/22/19  0906   *             Passed - Patient has had a Microalbumin in the past 12 mos.     Recent Labs   Lab Test 06/04/18  0922   MICROL 66   UMALCR 21.47           Passed - Patient has documented A1c within the specified period of time.     If HgbA1C is 8 or greater, it needs to be on file within the past 3 months.  If less than 8, must be on file within the past 6 months.     Recent Labs   Lab Test 04/22/19  0906   A1C 7.2*           Passed - Diagnosis not CHF        Passed - Medication is active on med list        Passed - Patient is age 18 or older        Passed - Patient is not pregnant        Passed - Patient has a normal serum Creatinine in the past 12 months     Recent Labs   Lab Test 04/22/19  0906   CR 0.77             Passed - Patient has not had a positive pregnancy test within the past 12 mos.        Passed - Recent (6 mo) or future (30 days) visit within the authorizing provider's specialty     Patient had office visit in the last 6 months or has a visit in the next 30 days with  "authorizing provider or within the authorizing provider's specialty.  See \"Patient Info\" tab in inbasket, or \"Choose Columns\" in Meds & Orders section of the refill encounter.       Routing refill request to provider for review/approval because:  Labs not current:  ALT, AST           metFORMIN (GLUCOPHAGE-XR) 750 MG 24 hr tablet [Pharmacy Med Name: METFORMIN HYDROCHLORIDE  MG Tablet Extended Release 24 Hour] 120 tablet 0     Sig: TAKE 1 TABLET TWICE DAILY  WITH  MEALS   Last Written Prescription Date:  4/22/2019  Last Fill Quantity: 180,  # refills: 0   Last office visit: 4/22/2019 with prescribing provider:     Future Office Visit:        Biguanide Agents Passed - 6/29/2019  7:35 PM        Passed - Blood pressure less than 140/90 in past 6 months     BP Readings from Last 3 Encounters:   06/27/19 118/74   04/22/19 122/64   10/22/18 118/62           Passed - Patient has documented LDL within the past 12 mos.     Recent Labs   Lab Test 04/22/19  0906   *             Passed - Patient has had a Microalbumin in the past 15 mos.     Recent Labs   Lab Test 06/04/18  0922   MICROL 66   UMALCR 21.47             Passed - Patient is age 10 or older        Passed - Patient has documented A1c within the specified period of time.     If HgbA1C is 8 or greater, it needs to be on file within the past 3 months.  If less than 8, must be on file within the past 6 months.     Recent Labs   Lab Test 04/22/19  0906   A1C 7.2*             Passed - Patient's CR is NOT>1.4 OR Patient's EGFR is NOT<45 within past 12 mos.     Recent Labs   Lab Test 04/22/19  0906   GFRESTIMATED 74   GFRESTBLACK 85       Recent Labs   Lab Test 04/22/19  0906   CR 0.77             Passed - Patient does NOT have a diagnosis of CHF.        Passed - Medication is active on med list        Passed - Patient is not pregnant        Passed - Patient has not had a positive pregnancy test within the past 12 mos.         Passed - Recent (6 mo) or future (30 " "days) visit within the authorizing provider's specialty     Patient had office visit in the last 6 months or has a visit in the next 30 days with authorizing provider or within the authorizing provider's specialty.  See \"Patient Info\" tab in inbasket, or \"Choose Columns\" in Meds & Orders section of the refill encounter.          Prescription approved per [facility] Refill Protocol.  Thao Subramanian RN      "
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
## 2019-07-02 NOTE — PATIENT INSTRUCTIONS
"Nail Debridement      A high quality instrument makes trimming toenails MUCH easier.  Search [employer] for any 5\" nail nipper manufactured by reliable brands such as Miltex, Integra or Jarit as these quality instruments will help manage difficult nails more effectively and comfortably. We use Miltex -SS.  A physician is not necessary to trim nails even if you are taking blood thinners or are diabetic.  Your family or care givers may help manage your toenails.      Trim or sand the nails once weekly.  Do not wait until they are long and painful or trimming will become too difficult and painful and will increase your risk of complications or infection.  A course file or 120 grit sandpaper on a sanding block can be helpful.  For very thick nails many people prefer battery operated camarena such as an Amope', Personal Pedi and Emjoi for regular use or heavy painful callouses or thick toenails.    Trim or skive any portion of nail that is thick, loose, crumbling, or not well attached. Do not tear the nail away, but rather cut them with a nail nipperor sand or sand them down.  You may follow up with your Podiatric Physician if you have pain, bleeding, infection, questions or other concerns.      You may also contact the following Registered Nurses for further help with nail debridement and minor hygiene concnerns.  They may come to your home or meet them at their clinic to trim your toenails and soak your feet, as well as monitor for any complications that would require evaluation by a Physician.      [facility]  Natasha Hayden RN  Office [phone]    [facility]  Fely Tamayo RN  [phone]   Call or text for appointment  Some home visits and has a clinic at:  [address] [place] [address]    [facility]  Vaughn Rubalcava RN  [phone]    [facility]  [phone]  [facility]  [phone]  [email or web address] - [facility]    For up to date list and to find foot care nurses in other communities visit [employer] Association website:  afcna.org.         Calluses, Corns, IPKs, Porokeratosis    When there is excessive friction or pressure on the skin, the body responds by making the skin thicker.  While this may protect the deeper structures, the thickened skin can take up more space and thus increase pressure over a bony prominence or become an open sore or skin ulcer as this skin becomes less flexible.    Flat, diffuse thickening are simple calluses and they are usually caused by friction.  Often these are the result of rubbing on a shoe or going barefoot.    Calluses with a central core between the toes are called corns.  These often result from prominent joints on adjacent toes rubbing together.  Theses are often a symptom of bone malalignment and will usually recur unless the underlying bones are addressed.    Many of these lesions can be kept comfortable with routine maintenance. This consists of filing them with a Ped Egg, callus file, or 120 grit sandpaper on a block, every day during your bath or shower.  Most people prefer battery operated camarena such as an Amope', Personal Pedi and Emjoi for regular use or heavy painful callouses.  Heavy creams or ointments can be applied 1-2 times every day to keep them soft. Toe spacers can be used for corns, gel pads can be used for other lesions on the bottom of the foot. If there is a deformity noted, such as a prominent bone, often this can be addressed to minimize recurrence. However, sometimes the pressure and lesion simply migrates to another spot after surgery, so it is not a guaranteed cure.     If you have severe callouses and cracking, you may apply heavy ointments that you scoop up such as Cetaphil cream, Eucerin, Aquaphor or Vaseline.  Be sure to obtain cream or ointment in these brands and not lotion  (lotion is water based and not durable enough for feet). For more aggressive help apply heavy creams or ointment under occlusive dressings such as Saran Wrap or Jelly Feet while sleeping.   Jelly Feet can be obtained at [email or web address].     To be successful with managing hyperkeratotic skin, you must manage hygiene daily.  Apply the cream once or twice EVERY day.  At your bath or shower time is the easiest time to work on this when skin is most soft.  There is no medical or surgical treatment that will absolutely eliminate many of these symptoms.      [employer] is a reliable source for all sorts of foot pads, cushions, or interdigital spacers and foot appliances. Go to www.[employer] or request a catalog at [employer].        Please call with any additional questions.           DIABETES AND YOUR FEET    What effect does diabetes have on the feet?  Diabetes can result in several problems in the feet including contractures of the tendons leading to deformities and reduced function of the bones, skin ulcers or open sores on pressure points or prominent deformities, reduced sensation, reduced blood flow and thus reduced oxygen and immune cells to the tiny vessels in our feet. This all leads to higher risk of hospitalization, infections, and amputations.     What is neuropathy?  Neuropathy is a term used to describe a loss of nerve function.  Patients with diabetes are at risk of developing neuropathy if their sugars continue to run high and are above the normal value of 140.  The elevated blood sugar in the body enters the nerves causing it to swell and impair nerve function.  The higher the blood sugar and the longer it is elevated, the more damage is done to nerves.  This damage is permanent and irreversible.  These damaged sensory nerves can then cause reduced feeling or cause pain.  Damaged motor nerves can reduce blood flow and white blood cells into into your foot, skin and bones reducing your ability to heal  "a small problem. And neuropathy can cause tendons to become unbalanced and contribute to the formation of deformity and contractures in our feet. Often times, neuropathy can be prevented by controlling your blood sugar.  Your risk of developing neuropathy goes up dramatically as your hemoglobin A1C raises above 7.5.      How do I know if I have neuropathy?  When a person develops neuropathy, they usually begin to feel numbness or tingling in their feet and sometimes in their legs.  Other symptoms may include painful burning or hot feet, tingling, electrical sensations or feeling like insects or ants are crawling on your feet or legs.  If blood sugar remains above 140  for long periods of time, neuropathy can also occur in the hands.  When a person loses their \"protective threshold\" or ability to detect a 5.[address] monofilament is when they have elevated risk for developing foot deformity, contractures, foot infections, amputations, Charcot arthropathy, or other complications. Keep your hemoglobin A1C below 7.5 to reduce this risk.    What is vascular disease?  Peripheral vascular disease is a term used to describe a loss or decrease in circulation (blood flow).  There is a problem in getting blood, immune cells, and oxygen to areas that need it.  Similar to neuropathy, sugars can build up in the walls of the arteries (blood vessels) and cause them to become swollen, thickened and hardened.  This decreases the amount of blood that can go to an area that needs it.  Though this is common in the legs of diabetic patients, it can also affect other arteries (blood vessels) in the body such as in the heart, kidney, eyes, and the blood flow into bones.  It is often seen first in the small vessels of her body notably our feet and toes.    How do I know if I have vascular disease?  In the legs, vascular disease usually results in cramping.  Patients who develop leg cramps after walking the same distance every time " (i.e. One block, half a mile, ect.) need to let their doctors know so that their circulation may be checked.  Cramps causing severe pain in the feet and/or legs while sleeping and the cramps go away when you stand or hang your legs off the side of the bed, may also be a sign of poor blood circulation.  Occasional cramping in cold weather or on rare occasions with activity may not be due to poor circulation, but you should inform your doctor.    How can these problems be prevented?  The key to prevention is good blood sugar control all day every day.  Inadequate blood sugar control is the most common way patients experience these problems. Reducing, controlling and measuring your daily consumption of sugar or carbohydrates is essential to understanding and managing diabetes.  Physical activity (exercise) is a very good way to help decrease your blood sugars.  Exercise can lower your blood sugar, blood pressure, and cholesterol.  It also reduces your risk for heart disease and stroke, relieves stress, and strengthens your heart, muscles and bones. Physical activity also increases your balance and reduces development of contractures and foot deformities over time. In addition, regular activity helps insulin work better, improves your blood circulation, and keeps your joints flexible.  If you're trying to lose weight, a combination of exercise and wise food choices can help you reach your target weight and maintain it.  Activity and exercise alone can not make up for poor diet choices, eating too much, or eating too many sugars or carbohydrates.  Ask your doctor for help when you are not meeting your blood sugar goals. Changes or increases in medication are powerful tools in reducing your blood sugar.    Know your blood sugar and hemoglobin A1C trend.  Upon first diagnosis or during acute illness, checking your blood sugar 4 times a day can help you understand how your diet, activity, and lifestyle affect your blood  sugar.  Monitoring your hemoglobin A1C can help you understand how well you are managing blood sugar over the long run.  Your hemoglobin A1C tells you what your blood sugar averages all day, every day, over the past 90 days.       To experience the lowest risk of complications associated with diabetes such as neuropathy, loss of blood flow, bone or joint infection, charcot arthropathy, or amputation, the American Diabetes Association recommends a target hemoglobin A1C of less than 7.0%, while the American Association of Clinical Endocrinologists' recommendation is 6.5% or less.  Both organizations advise that the goals be individualized based on patient factors such as other health conditions, history of hypoglycemia, education, and life expectancy.  A patients risk of experiencing complications associated with diabetes is only slightly elevated with a hemoglobin A1C above 6.0.  However, this risk goes up exponentially when the hemoglobin A1C is above 7.5.  The longer the hemoglobin A1C is elevated, the more risk that patient will experience in their lifetime. The damage that occurs to nerves, blood vessels, tendons, bones and body organs, while their hemoglobin A1C is elevated is mostly irreversible and worsens with each additional time period of elevated hemoglobin A1C.     You must understand and manage your disease.  Your health insurance or medical team cannot manage this disease for you.  When you take responsibility for understanding and managing your disease, you can expect to experience fewer problems associated with diabetes in your lifetime.  You will  Also experience a higher quality of life and health and reduced cost of health care.    Diabetic Foot Care Recommendations  The following are recommendations for avoiding serious foot problems or injury    DO'S  1. Be aware of your hemoglobin A1C and continue to follow up with your medical team for adjustments in your lifestyle and medication until your  reach your A1C goal.  Keep this below 7.5 to reduce your risk of developing complications associated with diabetes.    2.  Wash your feet with lukewarm water and a mild soap and then dry them thoroughly, especially between the toes.  Gently floss your towel or washcloth between each toe at every bath.  Soaking your feet in water cannot clean dead skin, debris, and bacteria from your feet and is not necessary.   3. Examine your feet daily looking for cuts, corns, blisters, cracks, ect..., especially after wearing new shoes or increased or changed activities.  Make sure to look between your toes.  If you cannot see the bottom of your feet, set a mirror on the floor and hold your foot over it, or ask a family member to examine your feet for you daily.  Contact your doctor immediately if new problems are noted or if sores are not healing.  4.  Immediately apply moisturizer cream such as Cetaphil to the tops and bottoms of your feet, avoiding areas between the toes.  Apply sunscreen or cover your feet if they will be exposed to extended sunlight.  5.Use clean comfortable shoes.  Socks should not have thick seams or cut off the circulation around the leg.  Break in new shoes slowly and rotate with older shoes until broken in.  Check the inside of your shoes with your hand to look for areas of irritation or objects that may have fallen into your shoes.    6. Keep slippers by the side of your bed for use during the night.  7. Shoes should be fitted by a professional and should not cause areas of irritation.  Check your feet regularly when wearing a new pair of shoes and replace them as needed.  8.  Talk to your doctor about proper exercise.  Exercise and stretching stimulate blood flow to your feet and maintain proper glucose levels.  Use it or lose it!  9.  Monitor your blood glucose level and your hemoglobin A1C.  Notify your doctor immediately if your blood sugar is abnormally high or low.  10.  Cut your nails straight  across, but then gently round any sharp edges with a nail file.  If you have neuropathy, peripheral vascular disease or cannot see that well to trim your own toenails, see a medical professional for care.    DONT'S  1.  Do not soak your feet if you have an open sore or your provider has informed you that you have neuropathy or loss of protective threshold.  Use only lukewarm water and always check the temperature with your hand as hot water can easily burn your feet.    2.  Never use a hot water bottle or heating pad on your feet.  Also do not apply hot or cold compresses to your feet.  With decreased sensation, you could burn or freeze your feet.  Do not rest your feet near a heat source such as a heater or heat register.    3.  Do not apply any of these to your feet:    - over the counter medicine for corns or warts    -  Harsh chemicals like boric acid    -  Do not self-treat corns, cuts, blisters or infections.  Always consult your doctor.   4.  Do not wear sandals, slippers or walk barefoot, especially on harsh surfaces.  5.  If you smoke, stop!!!

## 2019-07-03 ENCOUNTER — OFFICE VISIT (OUTPATIENT)
Dept: PODIATRY | Facility: OTHER | Age: 79
End: 2019-07-03
Payer: COMMERCIAL

## 2019-07-03 VITALS
WEIGHT: 161.3 LBS | TEMPERATURE: 97 F | DIASTOLIC BLOOD PRESSURE: 56 MMHG | SYSTOLIC BLOOD PRESSURE: 104 MMHG | BODY MASS INDEX: 25.92 KG/M2 | HEIGHT: 66 IN

## 2019-07-03 DIAGNOSIS — S93.505A SPRAIN OF FIFTH TOE OF LEFT FOOT, INITIAL ENCOUNTER: Primary | ICD-10-CM

## 2019-07-03 PROCEDURE — 99203 OFFICE O/P NEW LOW 30 MIN: CPT | Performed by: PODIATRIST

## 2019-07-03 ASSESSMENT — MIFFLIN-ST. JEOR: SCORE: 1220.46

## 2019-07-03 ASSESSMENT — PAIN SCALES - GENERAL: PAINLEVEL: MILD PAIN (2)
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
## 2019-09-18 ENCOUNTER — TELEPHONE (OUTPATIENT)
Dept: FAMILY MEDICINE | Facility: OTHER | Age: 79
End: 2019-09-18

## 2019-09-18 NOTE — TELEPHONE ENCOUNTER
Charlotte has an appointment at [facility] in [place] today and they have not received the paperwork from her April diabetic check.  She is asking that this be faxed right away.  [facility] - Fax [phone]  Phone - [phone]  Attention: Nohemi

## 2019-09-28 ENCOUNTER — HEALTH MAINTENANCE LETTER (OUTPATIENT)
Age: 79
End: 2019-09-28

## 2019-09-29 ENCOUNTER — APPOINTMENT (OUTPATIENT)
Dept: CT IMAGING | Facility: CLINIC | Age: 79
End: 2019-09-29
Attending: EMERGENCY MEDICINE
Payer: MEDICARE

## 2019-09-29 ENCOUNTER — HOSPITAL ENCOUNTER (OUTPATIENT)
Facility: CLINIC | Age: 79
Setting detail: OBSERVATION
Discharge: HOME | End: 2019-09-30
Attending: EMERGENCY MEDICINE | Admitting: HOSPITALIST
Payer: MEDICARE

## 2019-09-29 DIAGNOSIS — G45.9 TIA (TRANSIENT ISCHEMIC ATTACK): Primary | ICD-10-CM

## 2019-09-29 DIAGNOSIS — I63.9 CEREBROVASCULAR ACCIDENT (CVA), UNSPECIFIED MECHANISM (H): ICD-10-CM

## 2019-09-29 DIAGNOSIS — R29.700 NATIONAL INSTITUTES OF HEALTH STROKE SCALE (NIHSS) TOTAL SCORE 0: ICD-10-CM

## 2019-09-29 LAB
ALBUMIN SERPL-MCNC: 3.4 G/DL (ref 3.4–5)
ALBUMIN UR-MCNC: NEGATIVE MG/DL
ALP SERPL-CCNC: 68 U/L (ref 40–150)
ALT SERPL W P-5'-P-CCNC: 21 U/L (ref 0–50)
ANION GAP SERPL CALCULATED.3IONS-SCNC: 10 MMOL/L (ref 3–14)
APPEARANCE UR: CLEAR
APTT PPP: 35 SEC (ref 22–37)
AST SERPL W P-5'-P-CCNC: 19 U/L (ref 0–45)
BASOPHILS # BLD AUTO: 0 10E9/L (ref 0–0.2)
BASOPHILS NFR BLD AUTO: 0.5 %
BILIRUB DIRECT SERPL-MCNC: 0.1 MG/DL (ref 0–0.2)
BILIRUB SERPL-MCNC: 0.2 MG/DL (ref 0.2–1.3)
BILIRUB UR QL STRIP: NEGATIVE
BUN SERPL-MCNC: 15 MG/DL (ref 7–30)
CALCIUM SERPL-MCNC: 8.3 MG/DL (ref 8.5–10.1)
CHLORIDE SERPL-SCNC: 104 MMOL/L (ref 94–109)
CK SERPL-CCNC: 176 U/L (ref 30–225)
CO2 SERPL-SCNC: 27 MMOL/L (ref 20–32)
COLOR UR AUTO: NORMAL
CREAT SERPL-MCNC: 0.75 MG/DL (ref 0.52–1.04)
DIFFERENTIAL METHOD BLD: ABNORMAL
EOSINOPHIL NFR BLD AUTO: 3.7 %
ERYTHROCYTE [DISTWIDTH] IN BLOOD BY AUTOMATED COUNT: 14.2 % (ref 10–15)
ERYTHROCYTE [SEDIMENTATION RATE] IN BLOOD BY WESTERGREN METHOD: 14 MM/H (ref 0–30)
GFR SERPL CREATININE-BSD FRML MDRD: 76 ML/MIN/{1.73_M2}
GLUCOSE BLDC GLUCOMTR-MCNC: 105 MG/DL (ref 70–99)
GLUCOSE BLDC GLUCOMTR-MCNC: 133 MG/DL (ref 70–99)
GLUCOSE BLDC GLUCOMTR-MCNC: 209 MG/DL (ref 70–99)
GLUCOSE SERPL-MCNC: 184 MG/DL (ref 70–99)
GLUCOSE UR STRIP-MCNC: NEGATIVE MG/DL
HBA1C MFR BLD: 6.5 % (ref 0–5.6)
HCT VFR BLD AUTO: 32.8 % (ref 35–47)
HGB BLD-MCNC: 10.7 G/DL (ref 11.7–15.7)
HGB UR QL STRIP: NEGATIVE
IMM GRANULOCYTES # BLD: 0 10E9/L (ref 0–0.4)
IMM GRANULOCYTES NFR BLD: 0.4 %
INR PPP: 0.99 (ref 0.86–1.14)
KETONES UR STRIP-MCNC: NEGATIVE MG/DL
LEUKOCYTE ESTERASE UR QL STRIP: NEGATIVE
LYMPHOCYTES # BLD AUTO: 2.2 10E9/L (ref 0.8–5.3)
LYMPHOCYTES NFR BLD AUTO: 38.6 %
MCH RBC QN AUTO: 28.7 PG (ref 26.5–33)
MCHC RBC AUTO-ENTMCNC: 32.6 G/DL (ref 31.5–36.5)
MCV RBC AUTO: 88 FL (ref 78–100)
MONOCYTES # BLD AUTO: 0.5 10E9/L (ref 0–1.3)
MONOCYTES NFR BLD AUTO: 8.8 %
NEUTROPHILS # BLD AUTO: 2.7 10E9/L (ref 1.6–8.3)
NEUTROPHILS NFR BLD AUTO: 48 %
NITRATE UR QL: NEGATIVE
NRBC # BLD AUTO: 0 10*3/UL
NRBC BLD AUTO-RTO: 0 /100
PH UR STRIP: 7 PH (ref 5–7)
PLATELET # BLD AUTO: 231 10E9/L (ref 150–450)
POTASSIUM SERPL-SCNC: 4.2 MMOL/L (ref 3.4–5.3)
PROT SERPL-MCNC: 6.5 G/DL (ref 6.8–8.8)
RBC # BLD AUTO: 3.73 10E12/L (ref 3.8–5.2)
SODIUM SERPL-SCNC: 141 MMOL/L (ref 133–144)
SOURCE: NORMAL
SP GR UR STRIP: 1.02 (ref 1–1.03)
TROPONIN I SERPL-MCNC: <0.015 UG/L (ref 0–0.04)
TSH SERPL DL<=0.005 MIU/L-ACNC: 2.1 MU/L (ref 0.4–4)
UROBILINOGEN UR STRIP-MCNC: 0 MG/DL (ref 0–2)
WBC # BLD AUTO: 5.7 10E9/L (ref 4–11)

## 2019-09-29 PROCEDURE — 85610 PROTHROMBIN TIME: CPT | Performed by: EMERGENCY MEDICINE

## 2019-09-29 PROCEDURE — 85730 THROMBOPLASTIN TIME PARTIAL: CPT | Performed by: EMERGENCY MEDICINE

## 2019-09-29 PROCEDURE — 25000125 ZZHC RX 250: Performed by: EMERGENCY MEDICINE

## 2019-09-29 PROCEDURE — 85652 RBC SED RATE AUTOMATED: CPT | Performed by: NURSE PRACTITIONER

## 2019-09-29 PROCEDURE — 70498 CT ANGIOGRAPHY NECK: CPT

## 2019-09-29 PROCEDURE — 81003 URINALYSIS AUTO W/O SCOPE: CPT | Performed by: NURSE PRACTITIONER

## 2019-09-29 PROCEDURE — 93005 ELECTROCARDIOGRAM TRACING: CPT | Performed by: EMERGENCY MEDICINE

## 2019-09-29 PROCEDURE — 83036 HEMOGLOBIN GLYCOSYLATED A1C: CPT | Performed by: NURSE PRACTITIONER

## 2019-09-29 PROCEDURE — 96360 HYDRATION IV INFUSION INIT: CPT | Mod: 59 | Performed by: EMERGENCY MEDICINE

## 2019-09-29 PROCEDURE — 25000132 ZZH RX MED GY IP 250 OP 250 PS 637: Mod: GY | Performed by: EMERGENCY MEDICINE

## 2019-09-29 PROCEDURE — 84484 ASSAY OF TROPONIN QUANT: CPT | Performed by: EMERGENCY MEDICINE

## 2019-09-29 PROCEDURE — 99207 ZZC APP CREDIT; MD BILLING SHARED VISIT: CPT | Performed by: NURSE PRACTITIONER

## 2019-09-29 PROCEDURE — 70450 CT HEAD/BRAIN W/O DYE: CPT | Mod: XS

## 2019-09-29 PROCEDURE — 80048 BASIC METABOLIC PNL TOTAL CA: CPT | Performed by: EMERGENCY MEDICINE

## 2019-09-29 PROCEDURE — 80076 HEPATIC FUNCTION PANEL: CPT | Performed by: NURSE PRACTITIONER

## 2019-09-29 PROCEDURE — G0378 HOSPITAL OBSERVATION PER HR: HCPCS

## 2019-09-29 PROCEDURE — 84443 ASSAY THYROID STIM HORMONE: CPT | Performed by: NURSE PRACTITIONER

## 2019-09-29 PROCEDURE — 93010 ELECTROCARDIOGRAM REPORT: CPT | Mod: Z6 | Performed by: EMERGENCY MEDICINE

## 2019-09-29 PROCEDURE — 85025 COMPLETE CBC W/AUTO DIFF WBC: CPT | Performed by: EMERGENCY MEDICINE

## 2019-09-29 PROCEDURE — 99285 EMERGENCY DEPT VISIT HI MDM: CPT | Mod: 25 | Performed by: EMERGENCY MEDICINE

## 2019-09-29 PROCEDURE — 25000128 H RX IP 250 OP 636: Performed by: EMERGENCY MEDICINE

## 2019-09-29 PROCEDURE — 25000132 ZZH RX MED GY IP 250 OP 250 PS 637: Mod: GY | Performed by: HOSPITALIST

## 2019-09-29 PROCEDURE — 99207 ZZC CDG-CHARGE REQUIRED MANUAL ENTRY: CPT | Mod: 59 | Performed by: HOSPITALIST

## 2019-09-29 PROCEDURE — 00000146 ZZHCL STATISTIC GLUCOSE BY METER IP

## 2019-09-29 PROCEDURE — 82550 ASSAY OF CK (CPK): CPT | Performed by: NURSE PRACTITIONER

## 2019-09-29 PROCEDURE — 99220 ZZC INITIAL OBSERVATION CARE,LEVL III: CPT | Performed by: HOSPITALIST

## 2019-09-29 PROCEDURE — 25000132 ZZH RX MED GY IP 250 OP 250 PS 637: Performed by: NURSE PRACTITIONER

## 2019-09-29 RX ORDER — NALOXONE HYDROCHLORIDE 0.4 MG/ML
.1-.4 INJECTION, SOLUTION INTRAMUSCULAR; INTRAVENOUS; SUBCUTANEOUS
Status: DISCONTINUED | OUTPATIENT
Start: 2019-09-29 | End: 2019-09-30 | Stop reason: HOSPADM

## 2019-09-29 RX ORDER — NICOTINE POLACRILEX 4 MG
15-30 LOZENGE BUCCAL
Status: DISCONTINUED | OUTPATIENT
Start: 2019-09-29 | End: 2019-09-29

## 2019-09-29 RX ORDER — DEXTROSE MONOHYDRATE 25 G/50ML
25-50 INJECTION, SOLUTION INTRAVENOUS
Status: DISCONTINUED | OUTPATIENT
Start: 2019-09-29 | End: 2019-09-29

## 2019-09-29 RX ORDER — METFORMIN HYDROCHLORIDE 750 MG/1
750 TABLET, EXTENDED RELEASE ORAL
Status: DISCONTINUED | OUTPATIENT
Start: 2019-09-29 | End: 2019-09-29

## 2019-09-29 RX ORDER — ASPIRIN 325 MG
325 TABLET ORAL ONCE
Status: COMPLETED | OUTPATIENT
Start: 2019-09-29 | End: 2019-09-29

## 2019-09-29 RX ORDER — ASPIRIN 81 MG/1
81 TABLET, CHEWABLE ORAL DAILY
Status: DISCONTINUED | OUTPATIENT
Start: 2019-09-30 | End: 2019-09-30 | Stop reason: HOSPADM

## 2019-09-29 RX ORDER — NICOTINE POLACRILEX 4 MG
15-30 LOZENGE BUCCAL
Status: DISCONTINUED | OUTPATIENT
Start: 2019-09-29 | End: 2019-09-30 | Stop reason: HOSPADM

## 2019-09-29 RX ORDER — IOPAMIDOL 755 MG/ML
100 INJECTION, SOLUTION INTRAVASCULAR ONCE
Status: COMPLETED | OUTPATIENT
Start: 2019-09-29 | End: 2019-09-29

## 2019-09-29 RX ORDER — DEXTROSE MONOHYDRATE 25 G/50ML
25-50 INJECTION, SOLUTION INTRAVENOUS
Status: DISCONTINUED | OUTPATIENT
Start: 2019-09-29 | End: 2019-09-30 | Stop reason: HOSPADM

## 2019-09-29 RX ORDER — ATORVASTATIN CALCIUM 40 MG/1
40 TABLET, FILM COATED ORAL EVERY EVENING
Status: DISCONTINUED | OUTPATIENT
Start: 2019-09-29 | End: 2019-09-30 | Stop reason: HOSPADM

## 2019-09-29 RX ORDER — HYDROMORPHONE HYDROCHLORIDE 1 MG/ML
0.2 INJECTION, SOLUTION INTRAMUSCULAR; INTRAVENOUS; SUBCUTANEOUS ONCE
Status: DISCONTINUED | OUTPATIENT
Start: 2019-09-29 | End: 2019-09-29 | Stop reason: CLARIF

## 2019-09-29 RX ORDER — CLOPIDOGREL BISULFATE 75 MG/1
300 TABLET ORAL ONCE
Status: COMPLETED | OUTPATIENT
Start: 2019-09-29 | End: 2019-09-29

## 2019-09-29 RX ORDER — AMOXICILLIN 250 MG
1-2 CAPSULE ORAL
Status: DISCONTINUED | OUTPATIENT
Start: 2019-09-29 | End: 2019-09-30 | Stop reason: HOSPADM

## 2019-09-29 RX ORDER — CLOPIDOGREL BISULFATE 75 MG/1
75 TABLET ORAL DAILY
Status: DISCONTINUED | OUTPATIENT
Start: 2019-09-30 | End: 2019-09-30 | Stop reason: HOSPADM

## 2019-09-29 RX ORDER — GABAPENTIN 300 MG/1
300 CAPSULE ORAL 3 TIMES DAILY
Status: DISCONTINUED | OUTPATIENT
Start: 2019-09-29 | End: 2019-09-30 | Stop reason: HOSPADM

## 2019-09-29 RX ADMIN — ASPIRIN 325 MG ORAL TABLET 325 MG: 325 PILL ORAL at 16:12

## 2019-09-29 RX ADMIN — IOPAMIDOL 80 ML: 755 INJECTION, SOLUTION INTRAVENOUS at 15:05

## 2019-09-29 RX ADMIN — CLOPIDOGREL BISULFATE 300 MG: 75 TABLET ORAL at 17:27

## 2019-09-29 RX ADMIN — SODIUM CHLORIDE 100 ML: 9 INJECTION, SOLUTION INTRAVENOUS at 15:04

## 2019-09-29 RX ADMIN — ATORVASTATIN CALCIUM 40 MG: 40 TABLET, FILM COATED ORAL at 21:15

## 2019-09-29 RX ADMIN — GABAPENTIN 300 MG: 300 CAPSULE ORAL at 21:15

## 2019-09-29 RX ADMIN — SODIUM CHLORIDE 500 ML: 9 INJECTION, SOLUTION INTRAVENOUS at 15:10

## 2019-09-29 ASSESSMENT — ENCOUNTER SYMPTOMS
SHORTNESS OF BREATH: 0
CONFUSION: 0
ARTHRALGIAS: 0
EYE REDNESS: 0
COLOR CHANGE: 0
NECK STIFFNESS: 0
HEADACHES: 0
FEVER: 0
ABDOMINAL PAIN: 0
DIFFICULTY URINATING: 0

## 2019-09-29 ASSESSMENT — MIFFLIN-ST. JEOR: SCORE: 1228.88
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
## 2019-09-29 NOTE — ED NOTES
Upon rounding with patient she states she feels her vision is not as clear as it was earlier.  Vision is still better than it was initially.  Provider Informed.
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
## 2019-09-29 NOTE — ED NOTES
Patient states she was at [employer] reading the bible when she noted she could not read - things were fuzzy - left eye.  Patient states she normally sees farsided - right eye and near sided left eye.  Patient states she also had some numbness - left hand and slurred speech as well.  Patient went home to check BGL as she is diabetic.  Patient states her BGL was ok and she ate two cookies in order to ensure her BGL would be ok.  Patient states she started feeling better and than worsened.  Brought to ED via POV.  Upon arrival to ED patient was ambulatory without issue, speech was starting to clear.

## 2019-09-29 NOTE — ED TRIAGE NOTES
Was at [employer] this morning and felt lightheaded and had visual disturbance at 1045.  Patient checked BS and ate two cookies.  Speech slurred per her .

## 2019-09-30 ENCOUNTER — APPOINTMENT (OUTPATIENT)
Dept: CARDIOLOGY | Facility: CLINIC | Age: 79
End: 2019-09-30
Attending: FAMILY MEDICINE
Payer: MEDICARE

## 2019-09-30 ENCOUNTER — HOSPITAL ENCOUNTER (OUTPATIENT)
Dept: CARDIOLOGY | Facility: CLINIC | Age: 79
End: 2019-09-30
Attending: NURSE PRACTITIONER
Payer: MEDICARE

## 2019-09-30 ENCOUNTER — APPOINTMENT (OUTPATIENT)
Dept: MRI IMAGING | Facility: CLINIC | Age: 79
End: 2019-09-30
Attending: NURSE PRACTITIONER
Payer: MEDICARE

## 2019-09-30 VITALS
RESPIRATION RATE: 16 BRPM | TEMPERATURE: 97.6 F | HEART RATE: 75 BPM | WEIGHT: 164.02 LBS | HEIGHT: 65 IN | DIASTOLIC BLOOD PRESSURE: 63 MMHG | BODY MASS INDEX: 27.33 KG/M2 | SYSTOLIC BLOOD PRESSURE: 122 MMHG | OXYGEN SATURATION: 96 %

## 2019-09-30 DIAGNOSIS — G45.9 TIA (TRANSIENT ISCHEMIC ATTACK): ICD-10-CM

## 2019-09-30 LAB
CHOLEST SERPL-MCNC: 200 MG/DL
GLUCOSE BLDC GLUCOMTR-MCNC: 108 MG/DL (ref 70–99)
GLUCOSE BLDC GLUCOMTR-MCNC: 113 MG/DL (ref 70–99)
GLUCOSE BLDC GLUCOMTR-MCNC: 148 MG/DL (ref 70–99)
HDLC SERPL-MCNC: 73 MG/DL
LDLC SERPL CALC-MCNC: 113 MG/DL
NONHDLC SERPL-MCNC: 127 MG/DL
TRIGL SERPL-MCNC: 69 MG/DL

## 2019-09-30 PROCEDURE — 25500064 ZZH RX 255 OP 636: Performed by: HOSPITALIST

## 2019-09-30 PROCEDURE — 00000146 ZZHCL STATISTIC GLUCOSE BY METER IP

## 2019-09-30 PROCEDURE — 36415 COLL VENOUS BLD VENIPUNCTURE: CPT | Performed by: NURSE PRACTITIONER

## 2019-09-30 PROCEDURE — 99207 ZZC CDG-CHARGE REQUIRED MANUAL ENTRY: CPT | Mod: 59 | Performed by: HOSPITALIST

## 2019-09-30 PROCEDURE — 70553 MRI BRAIN STEM W/O & W/DYE: CPT

## 2019-09-30 PROCEDURE — 99207 ZZC APP CREDIT; MD BILLING SHARED VISIT: CPT | Performed by: NURSE PRACTITIONER

## 2019-09-30 PROCEDURE — 99217 ZZC OBSERVATION CARE DISCHARGE: CPT | Performed by: HOSPITALIST

## 2019-09-30 PROCEDURE — 40000264 ECHOCARDIOGRAM COMPLETE

## 2019-09-30 PROCEDURE — 93306 TTE W/DOPPLER COMPLETE: CPT | Mod: 26 | Performed by: INTERNAL MEDICINE

## 2019-09-30 PROCEDURE — 80061 LIPID PANEL: CPT | Performed by: NURSE PRACTITIONER

## 2019-09-30 PROCEDURE — G0378 HOSPITAL OBSERVATION PER HR: HCPCS

## 2019-09-30 PROCEDURE — A9585 GADOBUTROL INJECTION: HCPCS | Performed by: HOSPITALIST

## 2019-09-30 PROCEDURE — 25000132 ZZH RX MED GY IP 250 OP 250 PS 637: Mod: GY | Performed by: NURSE PRACTITIONER

## 2019-09-30 RX ORDER — ATORVASTATIN CALCIUM 40 MG/1
40 TABLET, FILM COATED ORAL EVERY EVENING
Qty: 30 TABLET | Refills: 0 | Status: SHIPPED | OUTPATIENT
Start: 2019-09-30 | End: 2019-10-25

## 2019-09-30 RX ORDER — CLOPIDOGREL BISULFATE 75 MG/1
75 TABLET ORAL DAILY
Qty: 30 TABLET | Refills: 0 | Status: SHIPPED | OUTPATIENT
Start: 2019-09-30 | End: 2019-09-30

## 2019-09-30 RX ORDER — CLOPIDOGREL BISULFATE 75 MG/1
75 TABLET ORAL DAILY
Qty: 30 TABLET | Refills: 0 | Status: SHIPPED | OUTPATIENT
Start: 2019-09-30 | End: 2019-10-21

## 2019-09-30 RX ORDER — GADOBUTROL 604.72 MG/ML
7.5 INJECTION INTRAVENOUS ONCE
Status: COMPLETED | OUTPATIENT
Start: 2019-09-30 | End: 2019-09-30

## 2019-09-30 RX ORDER — ASPIRIN 81 MG/1
81 TABLET, CHEWABLE ORAL DAILY
Qty: 21 TABLET | Refills: 0 | Status: SHIPPED | OUTPATIENT
Start: 2019-09-30 | End: 2019-09-30

## 2019-09-30 RX ORDER — ASPIRIN 81 MG/1
81 TABLET, CHEWABLE ORAL DAILY
Qty: 21 TABLET | Refills: 0 | Status: SHIPPED | OUTPATIENT
Start: 2019-09-30 | End: 2020-10-05

## 2019-09-30 RX ADMIN — GADOBUTROL 7.5 ML: 604.72 INJECTION INTRAVENOUS at 10:12

## 2019-09-30 RX ADMIN — ASPIRIN 81 MG 81 MG: 81 TABLET ORAL at 09:26

## 2019-09-30 RX ADMIN — CLOPIDOGREL BISULFATE 75 MG: 75 TABLET ORAL at 09:27

## 2019-09-30 RX ADMIN — GABAPENTIN 300 MG: 300 CAPSULE ORAL at 09:27

## 2019-09-30 ASSESSMENT — MIFFLIN-ST. JEOR: SCORE: 1224.88

## 2019-09-30 NOTE — PROGRESS NOTES
"S-(situation): Patient discharged to home via w/c with  @ 1315.    B-(background): Observation goals met     A-(assessment):Neuro's intact. States \"some blurriness\" to her vision. opthamatry appt made for pt per consult order. Pt up independently in room. No dysrhythmia noted on tele, NSR. /63 (BP Location: Left arm)   Pulse 75   Temp 97.6  F (36.4  C) (Oral)   Resp 16   Ht 1.651 m (5' 5\")   Wt 74.4 kg (164 lb 0.4 oz)   SpO2 96%   BMI 27.29 kg/m      R-(recommendations): Discharge instructions reviewed with pt and . Listed belongings gathered and returned to patient.  Patient Education resolved: Yes  New medications-Pt. Has been educated about reason of use and side effects Yes  Home and hospital acquired medications returned to patient NA  Medication Bin checked and emptied on discharge Yes      "

## 2019-09-30 NOTE — PROGRESS NOTES
VSS, neuro WNL.  Uses call light to get up for BRP's, gait steady.  Denies pain.  Telemetry shows SR.

## 2019-09-30 NOTE — DISCHARGE SUMMARY
"[facility]  Hospitalist Discharge Summary       Date of Admission:  9/29/2019  Date of Discharge:  9/30/2019  Discharging Provider: Acosta Mckay NP    Discharge Diagnoses   Principal Problem:    TIA (transient ischemic attack)  Active Problems:    CKD (chronic kidney disease) stage 2, GFR 60-89 ml/min    Type 2 diabetes mellitus with diabetic polyneuropathy (H)    Hyperlipidemia LDL goal <100      Follow-ups Needed After Discharge   Follow-up Appointments     Follow-up and recommended labs and tests       Follow up with primary care provider, Aron Villa, within 7   days for hospital follow- up.  The following labs/tests are recommended:   48 hour holter monitor.             Unresulted Labs Ordered in the Past 30 Days of this Admission     No orders found for last 31 day(s).          Discharge Disposition   Discharged to home  Condition at discharge: Stable    Hospital Course   Charlotte Snow is a 79 year old female admitted on 9/29/2019. She presented to the emergency department complaining of visual disturbance and \"feeling funny\" while at [employer] at 1045 this morning.  Patient thought she was having low blood sugar and ate a cookie and when she arrived home she still felt dizzy and could not read properly.  Patient's  states he thought her words were slurred.  Patient denies chest pain, palpitations, nausea, headache.  Patient symptoms have resolved at this time.  Patient was admitted to observation and underwent MRI of the head and echocardiogram the next morning. MRI negative for acute pathology and echocardiogram showed normal left ventricular function and no evidence for interarterial septal shunt. Patient denies pain. Denies shortness of breath and patient is maintaining oxygen saturations above 90% on room air.  Denies nausea and is tolerating oral intake. No focal neurologic deficits although patient continues to note some blurring of vision. Patient " is afebrile and hemodynamically stable.       TIA (transient ischemic attack)  Assessment: Patient presented as above with visual disturbance, dizzy, unable to read with slurred speech.  Patient presented to the emergency department and her symptoms had resolved.  Patient had a CT of her head and neck which was negative.  ED physician called stroke neurology who recommends Plavix 300 mg now and 75 mg daily for 3 weeks and aspirin 81 mg daily after loading dose for 3 weeks.  Patient is completely asymptomatic at this time.  9/30-MRI negative for acute pathology and echocardiogram showed normal left ventricular function and no evidence for interarterial septal shunt. Patient denies pain. Denies shortness of breath and patient is maintaining oxygen saturations above 90% on room air.  Denies nausea and is tolerating oral intake. No focal neurologic deficits although patient continues to note some blurring of vision. Patient is afebrile and hemodynamically stable.   Plan: Patient medically stable for hospital discharge. Given 48 hour Holter monitor to evaluate for any arrhythmias. Given prescription for baby aspirin 81 mg once daily which can be stopped after 21 days. Patient also given script for Plavix 75 mg once daily. Recommended follow up with PCP within one week. Patient requested and was given referral to opthalmology for ongoing blurring of her vision.       CKD (chronic kidney disease) stage 2, GFR 60-89 ml/min  Assessment: Chronic and stable  Plan: No further acute intervention indicated      Type 2 diabetes mellitus with diabetic polyneuropathy (H)  Assessment: Chronic and stable, patient states her blood sugars have been running high.  Patient is normally on metformin and Actos.  Plan: Home dose of metformin and Actos resumed after discharge.       Hyperlipidemia LDL goal <100  Assessment: Chronic and stable prior to admission.  Patient has not been on medication. 9/30-Patient cholesterol noted to be slightly  elevated at 200 and LDL elevated at 113.   Plan: Patient discharged with prescription for daily Lipitor. Recommend follow up cholesterol testing as outpatient.       Consultations This Hospital Stay   SMOKING CESSATION PROGRAM IP CONSULT    Code Status   Full Code    Time Spent on this Encounter   IAcosta NP, personally saw the patient today and spent greater than 30 minutes discharging this patient.       Acosta Mckay NP  [facility]  ______________________________________________________________________    Physical Exam   Vital Signs: Temp: 97.6  F (36.4  C) Temp src: Oral BP: 122/63 Pulse: 75 Heart Rate: 71 Resp: 16 SpO2: 96 % O2 Device: None (Room air)    Weight: 164 lbs .36 oz  Constitutional: awake, alert, cooperative, no apparent distress, and appears stated age  Respiratory: No increased work of breathing, good air exchange, clear to auscultation bilaterally, no crackles or wheezing  Cardiovascular: regular rate and rhythm, normal S1 and S2 and no murmur noted  GI: normal bowel sounds, non-distended and non-tender  Skin: no bruising or bleeding, normal skin color, texture, turgor and no redness, warmth, or swelling  Musculoskeletal: no lower extremity pitting edema present  there is no redness, warmth, or swelling of the joints  Neurologic: Awake, alert, oriented to name, place and time.  Cranial nerves II-XII are grossly intact.  Motor is 5 out of 5 bilaterally.  Cerebellar finger to nose, heel to shin intact.  Sensory is intact. .       Primary Care Physician   Aron Villa    Discharge Orders      OPHTHALMOLOGY ADULT REFERRAL      Reason for your hospital stay    TIA     Follow-up and recommended labs and tests     Follow up with primary care provider, Aron Villa, within 7 days for hospital follow- up.  The following labs/tests are recommended: 48 hour holter monitor.     Activity    Your activity upon discharge: activity as tolerated      Holter Monitor 48 hour Adult Pediatric     Diet    Follow this diet upon discharge: Orders Placed This Encounter      Consistent Carbohydrate Diet [id number] Calories: Low Consistent CHO (3-5 CHO units/meal)       Significant Results and Procedures   Most Recent 3 CBC's:  Recent Labs   Lab Test 09/29/19  1451 10/21/16  0952 05/21/14  0839 02/21/13  1041   WBC 5.7 6.7  --  5.8   HGB 10.7* 12.0 12.3 12.7   MCV 88 86  --  86    296  --  287     Most Recent 3 BMP's:  Recent Labs   Lab Test 09/29/19  1451 04/22/19  0906 06/04/18  0815    141 141   POTASSIUM 4.2 4.1 4.3   CHLORIDE 104 107 104   CO2 27 28 31   BUN 15 22 19   CR 0.75 0.77 0.84   ANIONGAP 10 6 6   KB 8.3* 8.7 8.9   * 140* 111*     Most Recent 2 LFT's:  Recent Labs   Lab Test 09/29/19  1451 08/18/14  0759  07/23/12  0919   AST 19  --   --  31   ALT 21 28   < > 16   ALKPHOS 68  --   --  74   BILITOTAL 0.2  --   --  0.5    < > = values in this interval not displayed.   ,   Results for orders placed or performed during the hospital encounter of 09/29/19   CT Head w/o Contrast    Narrative    CT OF THE HEAD WITHOUT CONTRAST 9/29/2019 2:43 PM     COMPARISON: None    HISTORY: Left eye visual disturbance, slurred speech.    TECHNIQUE: 5 mm thick axial CT images of the head were acquired  without IV contrast material.    FINDINGS: There is mild diffuse cerebral volume loss. There are subtle  patchy areas of decreased density in the cerebral white matter  bilaterally that are consistent with sequela of chronic small vessel  ischemic disease.    The ventricles and basal cisterns are within normal limits in  configuration given the degree of cerebral volume loss.  There is no  midline shift. There are no extra-axial fluid collections.    No intracranial hemorrhage, mass or recent infarct.    The visualized paranasal sinuses are well-aerated. There is no  mastoiditis. There are no fractures of the visualized bones.      Impression    IMPRESSION:  Diffuse cerebral volume loss and cerebral white matter  changes consistent with chronic small vessel ischemic disease. No  evidence for acute intracranial pathology.        Radiation dose for this scan was reduced using automated exposure  control, adjustment of the mA and/or kV according to patient size, or  iterative reconstruction technique    FATUMA MAYEN MD   CTA Head Neck with Contrast    Narrative    CT ANGIOGRAM OF THE HEAD AND NECK WITHOUT AND WITH CONTRAST  9/29/2019  3:05 PM     COMPARISON: None.    HISTORY: Left eye visual disturbance, slurred speech.    TECHNIQUE:  Precontrast localizing scans were followed by CT  angiography with an injection of Isovue-370, 80mL nonionic intravenous  contrast material with scans through the head and neck.  Images were  transferred to a separate 3-D workstation where multiplanar  reformations and 3-D images were created.  Estimates of carotid  stenoses are made relative to the distal internal carotid artery  diameters except as noted.      FINDINGS:   Neck CTA: The common carotid arteries bilaterally are patent without  stenosis. There is calcified atherosclerotic plaque at the origins of  the internal carotid arteries on both sides that does not result in  any significant narrowing by NASCET criteria on either side. The  cervical internal carotid arteries bilaterally are otherwise patent  and unremarkable. The vertebral arteries bilaterally are patent and  unremarkable.    Head CTA: There is calcified atherosclerotic plaque of the  intracranial distal internal carotid arteries on both sides that does  not result in any significant vascular narrowing on either side. The  basilar, bilateral anterior cerebral, bilateral middle cerebral and  bilateral posterior cerebral arteries are patent and unremarkable. The  anterior communicating and bilateral posterior communicating arteries  are patent.      Impression    IMPRESSION: Calcified atherosclerotic plaque of the  proximal and  distal internal carotid arteries on both sides that does not result in  any significant vascular narrowing on either side. Otherwise, normal  neck and head CTA.    Radiation dose for this scan was reduced using automated exposure  control, adjustment of the mA and/or kV according to patient size, or  iterative reconstruction technique.      FATUMA MAYEN MD   MR Brain with and without contrast    Narrative    MRI BRAIN WITHOUT AND WITH CONTRAST  9/30/2019 10:44 AM    HISTORY:  Transient ischemic attack, initial exam. Left-sided  tingling.    TECHNIQUE:  Multiplanar, multisequence MRI of the brain without and  with 7.5 mL Gadavist.    COMPARISON: None.    FINDINGS:  The brain parenchyma, ventricles and subarachnoid spaces  appear normal.  There is no evidence of hemorrhage, mass, acute  infarct, or anomaly.  There are no gadolinium enhancing lesions.    The facial structures appear normal. The arteries at the base of the  brain and the dural venous sinuses appear patent.       Impression    IMPRESSION:  Normal MRI of the brain.     BLANCA TAPIA MD       Discharge Medications   Current Discharge Medication List      START taking these medications    Details   aspirin (ASA) 81 MG chewable tablet Take 1 tablet (81 mg) by mouth daily  Qty: 21 tablet, Refills: 0    Associated Diagnoses: TIA (transient ischemic attack)      atorvastatin (LIPITOR) 40 MG tablet Take 1 tablet (40 mg) by mouth every evening  Qty: 30 tablet, Refills: 0    Associated Diagnoses: TIA (transient ischemic attack)      clopidogrel (PLAVIX) 75 MG tablet 1 tablet (75 mg) by Oral or NG Tube route daily  Qty: 30 tablet, Refills: 0    Associated Diagnoses: TIA (transient ischemic attack)         CONTINUE these medications which have NOT CHANGED    Details   ACCU-CHEK COMPACT PLUS test strip TEST 1-2 TIMES DAILY  Qty: 100 strip, Refills: 4    Associated Diagnoses: Type 2 diabetes, HbA1C goal < 8% (H)      acetaminophen (ACETAMINOPHEN EXTRA  STRENGTH) 500 MG tablet Take 1-2 tablets (500-1,000 mg) by mouth every 6 hours as needed for mild pain      alfuzosin ER (UROXATRAL) 10 MG 24 hr tablet Take 10 mg by mouth daily   Qty: 30 tablet, Refills: 1    Associated Diagnoses: Urgency incontinence      biotin (BIOTIN 5000) 5 MG CAPS Take 1 capsule by mouth daily  Qty: 30 capsule      blood glucose monitoring (ACCU-CHEK MULTICLIX) lancets Use to test blood sugar 1-2 times daily or as directed.  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  1 capsule by mouth twice daily.      MULTI VIT/FL OR 1 TABLET DAILY      pioglitazone (ACTOS) 30 MG tablet TAKE 1 TABLET EVERY DAY  Qty: 60 tablet, Refills: 0    Associated Diagnoses: Type 2 diabetes mellitus with diabetic polyneuropathy, without long-term current use of insulin (H)      triamcinolone (KENALOG) 0.1 % external ointment APPLY TO AFFECTED AREA SPARINGLY at night twice weekly  Qty: 30 g, Refills: 1    Associated Diagnoses: Vaginal itching         STOP taking these medications       ACE/ARB/ARNI NOT PRESCRIBED, INTENTIONAL, Comments:   Reason for Stopping:         ASPIRIN 81 MG OR TABS Comments:   Reason for Stopping:              Allergies   Allergies   Allergen Reactions     Ace Inhibitors      Prinivil made her cough     Codeine      Losartan Potassium Hives     Cozaar     Sulfa Drugs Itching

## 2019-09-30 NOTE — PROGRESS NOTES
"S-(situation): Patient registered to Observation. Patient arrived to room 269 via wheelchair from ED.    B-(background): TIA    A-(assessment): Pt denies pain. Is alert and oriented. Neuro checks WNL besides vision which is very slightly blurred. Pt states vision is not at baseline, yet. Vitals as follows: BP (!) 159/75 (BP Location: Left arm)   Pulse 68   Temp 97  F (36.1  C) (Oral)   Resp 16   Ht 1.651 m (5' 5\")   Wt 74.8 kg (164 lb 14.5 oz)   SpO2 94%   BMI 27.44 kg/m   . Telemetry in place. Passed dysphagia screen. Able to drink water with no difficulties. Spouse at bedside upon admission to room.    R-(recommendations): Orders and observation goals reviewed with patient and pt's spouse    Nursing Observation criteria listed below was met:    Skin issues/needs documented:No  Isolation needs addressed, if appropriate: NA  Fall Prevention: Education given and documented: Yes  Education Assessment documented:Yes  Education Documented (Pre-existing chronic infection such as, MRSA/VRE need education on admission): Yes  OBS video/handout Reviewed & DocumentedYes per ED RN  Medication Reconciliation Complete: Yes  New medication patient education completed and documented (Possible Side Effects of Common Medications handout): Yes  Home medications if not able to send immediately home with family stored here: NA  Reminder note placed in discharge instructions: NA  Patient has discharge needs (If yes, please explain): No            "

## 2019-10-01 ENCOUNTER — TELEPHONE (OUTPATIENT)
Dept: FAMILY MEDICINE | Facility: OTHER | Age: 79
End: 2019-10-01

## 2019-10-01 DIAGNOSIS — E11.42 TYPE 2 DIABETES MELLITUS WITH DIABETIC POLYNEUROPATHY, WITHOUT LONG-TERM CURRENT USE OF INSULIN (H): ICD-10-CM

## 2019-10-01 RX ORDER — METFORMIN HYDROCHLORIDE 750 MG/1
TABLET, EXTENDED RELEASE ORAL
Qty: 60 TABLET | Refills: 0 | Status: SHIPPED | OUTPATIENT
Start: 2019-10-01 | End: 2019-10-09

## 2019-10-01 RX ORDER — PIOGLITAZONEHYDROCHLORIDE 30 MG/1
TABLET ORAL
Qty: 30 TABLET | Refills: 0 | Status: SHIPPED | OUTPATIENT
Start: 2019-10-01 | End: 2019-10-09

## 2019-10-01 NOTE — TELEPHONE ENCOUNTER
ED / Discharge Outreach Protocol    Patient Contact    Attempt # 1    Was call answered?  No.  Left message on voicemail with information to call me back.    BING MontanaN, RN  [facility]

## 2019-10-01 NOTE — TELEPHONE ENCOUNTER
Requested Prescriptions   Pending Prescriptions Disp Refills     metFORMIN (GLUCOPHAGE-XR) 750 MG 24 hr tablet 120 tablet 0   Last Written Prescription Date:  7/1/19  Last Fill Quantity: 120,  # refills: 0   Last office visit: 6/27/2019 with prescribing provider:  6/27/19   Future Office Visit:   Next 5 appointments (look out 90 days)    Oct 07, 2019  2:20 PM CDT  [facility] [address] [facility] [address]  [phone]   Oct 22, 2019  7:20 AM CDT  [facility] [address] [facility] [address]  [phone]           Biguanide Agents Failed - 10/1/2019 11:37 AM        Failed - Patient has had a Microalbumin in the past 15 mos.     Recent Labs   Lab Test 06/04/18  0922   MICROL 66   UMALCR 21.47             Passed - Blood pressure less than 140/90 in past 6 months     BP Readings from Last 3 Encounters:   09/30/19 122/63   07/03/19 104/56   06/27/19 118/74                 Passed - Patient has documented LDL within the past 12 mos.     Recent Labs   Lab Test 09/30/19  0548   *             Passed - Patient is age 10 or older        Passed - Patient has documented A1c within the specified period of time.     If HgbA1C is 8 or greater, it needs to be on file within the past 3 months.  If less than 8, must be on file within the past 6 months.     Recent Labs   Lab Test 09/29/19  1451   A1C 6.5*             Passed - Patient's CR is NOT>1.4 OR Patient's EGFR is NOT<45 within past 12 mos.     Recent Labs   Lab Test 09/29/19  1451   GFRESTIMATED 76   GFRESTBLACK 88       Recent Labs   Lab Test 09/29/19  1451   CR 0.75             Passed - Patient does NOT have a diagnosis of CHF.        Passed - Medication is active on med list        Passed - Patient is not pregnant        Passed - Patient has not had a positive pregnancy test  "within the past 12 mos.         Passed - Recent (6 mo) or future (30 days) visit within the authorizing provider's specialty     Patient had office visit in the last 6 months or has a visit in the next 30 days with authorizing provider or within the authorizing provider's specialty.  See \"Patient Info\" tab in inbasket, or \"Choose Columns\" in Meds & Orders section of the refill encounter.            pioglitazone (ACTOS) 30 MG tablet 60 tablet 0   Last Written Prescription Date:  7/1/19  Last Fill Quantity: 60,  # refills: 0   Last office visit: 6/27/2019 with prescribing provider:  6/27/19   Future Office Visit:   Next 5 appointments (look out 90 days)    Oct 07, 2019  2:20 PM CDT  [facility] [address] [facility] [address]  [phone]   Oct 22, 2019  7:20 AM CDT  [facility] DO  [facility] [address] [facility] [address]  [phone]             Thiazolidinedione Agents (TZDs)  Failed - 10/1/2019 11:37 AM        Failed - Patient has had a Microalbumin in the past 12 mos.     Recent Labs   Lab Test 06/04/18  0922   MICROL 66   UMALCR 21.47             Passed - Blood pressure less than 140/90 in past 6 months     BP Readings from Last 3 Encounters:   09/30/19 122/63   07/03/19 104/56   06/27/19 118/74                 Passed - Patient has documented LDL within the past 12 mos.     Recent Labs   Lab Test 09/30/19  0548   *             Passed - Patient has a normal ALT within the past 12 mos.     Recent Labs   Lab Test 09/29/19  1451   ALT 21             Passed - Patient has a normal AST within the past 12 mos.      Recent Labs   Lab Test 09/29/19  1451   AST 19             Passed - Patient has documented A1c within the specified period of time.     If HgbA1C is 8 or greater, it needs to be on file within the past 3 months.  If less " "than 8, must be on file within the past 6 months.     Recent Labs   Lab Test 09/29/19  1451   A1C 6.5*             Passed - Diagnosis not CHF        Passed - Medication is active on med list        Passed - Patient is age 18 or older        Passed - Patient is not pregnant        Passed - Patient has a normal serum Creatinine in the past 12 months     Recent Labs   Lab Test 09/29/19  1451   CR 0.75             Passed - Patient has not had a positive pregnancy test within the past 12 mos.        Passed - Recent (6 mo) or future (30 days) visit within the authorizing provider's specialty     Patient had office visit in the last 6 months or has a visit in the next 30 days with authorizing provider or within the authorizing provider's specialty.  See \"Patient Info\" tab in inbasket, or \"Choose Columns\" in Meds & Orders section of the refill encounter.            "

## 2019-10-01 NOTE — TELEPHONE ENCOUNTER
Routing refill request to provider for review/approval because:  Labs out of range:  LDL  Labs not current:  Microalbumin    BING MontanaN, RN  [facility]

## 2019-10-01 NOTE — TELEPHONE ENCOUNTER
Patient called to schedule an appointment for a hospital follow-up or appeared on a report showing that they were recently discharged from the hospital.    Patient was admitted to :  [facility]  Discharged date: 09-  Reason for hospital admission:  Cerebrovascular Accident (Cva), Unspecified Mechanism (H), Tia (Transient Ischemic Attack)  Does patient have future appointment scheduled with provider? Yes - Daisy  Date of future appointment:  10-      This information will be used to help the care team plan for the patients upcoming visit.  The triage RN may determine that a follow up call is necessary and reach out to the patient via the phone number listed in the chart.     Please route this message on routine priority to the Triage RN pool.

## 2019-10-02 NOTE — TELEPHONE ENCOUNTER
ED / Discharge Outreach Protocol    Patient Contact    Attempt # 2    Was call answered?  No.  Unable to leave message.    BING MontanaN, RN  [facility]

## 2019-10-02 NOTE — TELEPHONE ENCOUNTER
"Hospital/TCU/ED for chronic condition Discharge Protocol    \"Hi, my name is Raya Garrett RN, a registered nurse, and I am calling from [facility].  I am calling to follow up and see how things are going for you after your recent emergency visit/hospital/TCU stay.\"    Tell me how you are doing now that you are home?\" Doing pretty well. Eyes are not perfect yet. Seeing eye DR on Friday\"      Discharge Instructions    \"Let's review your discharge instructions.  What is/are the follow-up recommendations?  Pt. Response: Follow up as needed    \"Has an appointment with your primary care provider been scheduled?\"   Yes. (confirm)    \"When you see the provider, I would recommend that you bring your medications with you.\"    Medications    \"Tell me what changed about your medicines when you discharged?\"    Changes to chronic meds?    0-1    \"What questions do you have about your medications?\"    None     New diagnoses of heart failure, COPD, diabetes, or MI?    No              Post Discharge Medication Reconciliation Status: discharge medications reconciled, continue medications without change.    Was MTM referral placed (*Make sure to put transitions as reason for referral)?   No    Call Summary    \"What questions or concerns do you have about your recent visit and your follow-up care?\"     none    \"If you have questions or things don't continue to improve, we encourage you contact us through the main clinic number (give number).  Even if the clinic is not open, triage nurses are available 24/7 to help you.     We would like you to know that our clinic has extended hours (provide information).  We also have urgent care (provide details on closest location and hours/contact info)\"      \"Thank you for your time and take care!\"    ANGY Montana, RN  [facility]  "

## 2019-10-09 DIAGNOSIS — E11.42 TYPE 2 DIABETES MELLITUS WITH DIABETIC POLYNEUROPATHY, WITHOUT LONG-TERM CURRENT USE OF INSULIN (H): ICD-10-CM

## 2019-10-09 RX ORDER — PIOGLITAZONEHYDROCHLORIDE 30 MG/1
TABLET ORAL
Qty: 90 TABLET | Refills: 1 | Status: SHIPPED | OUTPATIENT
Start: 2019-10-09 | End: 2019-10-15

## 2019-10-09 RX ORDER — PIOGLITAZONEHYDROCHLORIDE 30 MG/1
TABLET ORAL
Qty: 7 TABLET | Refills: 0 | Status: SHIPPED | OUTPATIENT
Start: 2019-10-09 | End: 2019-10-15

## 2019-10-09 RX ORDER — METFORMIN HYDROCHLORIDE 750 MG/1
TABLET, EXTENDED RELEASE ORAL
Qty: 180 TABLET | Refills: 1 | Status: SHIPPED | OUTPATIENT
Start: 2019-10-09 | End: 2020-05-05

## 2019-10-09 NOTE — TELEPHONE ENCOUNTER
Pharmacy calling as patients  is there to  the 7 day supply. Please advise patient when prescription is sent        Coni Gillette ~ [place] [address]  [email or web address]  [email or web address]  Office:  [phone]  Fax:  [phone]

## 2019-10-09 NOTE — TELEPHONE ENCOUNTER
Pt is out of medication, requesting a 7 day supply sent locally until she gets her mail ordered prescription.    Meli Arechiga MA     10/9/2019

## 2019-10-15 ENCOUNTER — TELEPHONE (OUTPATIENT)
Dept: FAMILY MEDICINE | Facility: OTHER | Age: 79
End: 2019-10-15

## 2019-10-15 NOTE — TELEPHONE ENCOUNTER
Reason for Call:  Other prescription    Detailed comments: Pharmacy calls to make sure Dr Daisy is aware of the significant interaction between her Plavix and Actos.  A different provider has started Charlotte on Plavix since her TIA.  Pharmacist states she has also discussed this with the patient.    Phone Number Patient can be reached at: [phone]    Best Time: any    Can we leave a detailed message on this number? YES    Call taken on 10/15/2019 at 2:04 PM by Nohemi Rdoney

## 2019-10-15 NOTE — TELEPHONE ENCOUNTER
I have attempted to call the pt with the following message. I left message for pt to call back. I will call back another time. Ratna Deleon CMA ([facility])

## 2019-10-15 NOTE — LETTER
[facility]  [address] [facility] [id number] [phone]        October 16, 2019    Charlotte Snow  [address] [facility] [address]          Dear Charlotte,    We have attempted to reach you by phone in regards to your medication. In light of concerns regarding the possible interactions between Plavix and Actos, the patient should discontinue Actos immediately and set up a follow-up appointment such that we may discuss further diabetic/blood sugar management.    Sincerely,        Aron Villa, DO

## 2019-10-15 NOTE — TELEPHONE ENCOUNTER
In light of concerns regarding the possible interactions between Plavix and Actos, the patient should discontinue Actos immediately and set up a follow-up appointment such that we may discuss further diabetic/blood sugar management.    Daisy

## 2019-10-16 ENCOUNTER — MYC MEDICAL ADVICE (OUTPATIENT)
Dept: FAMILY MEDICINE | Facility: OTHER | Age: 79
End: 2019-10-16

## 2019-10-16 NOTE — TELEPHONE ENCOUNTER
2nd attempt called and LM for patient to call back. Please relay message. Letter sent as well. Mychart sent as well.   Victoria Wyatt MA

## 2019-10-18 ENCOUNTER — OFFICE VISIT (OUTPATIENT)
Dept: FAMILY MEDICINE | Facility: OTHER | Age: 79
End: 2019-10-18
Payer: COMMERCIAL

## 2019-10-18 ENCOUNTER — TELEPHONE (OUTPATIENT)
Dept: FAMILY MEDICINE | Facility: OTHER | Age: 79
End: 2019-10-18

## 2019-10-18 VITALS
BODY MASS INDEX: 27.06 KG/M2 | RESPIRATION RATE: 16 BRPM | TEMPERATURE: 97.3 F | HEART RATE: 84 BPM | WEIGHT: 162.6 LBS | OXYGEN SATURATION: 95 % | SYSTOLIC BLOOD PRESSURE: 128 MMHG | DIASTOLIC BLOOD PRESSURE: 64 MMHG

## 2019-10-18 DIAGNOSIS — G45.9 TIA (TRANSIENT ISCHEMIC ATTACK): ICD-10-CM

## 2019-10-18 DIAGNOSIS — Z23 NEED FOR PROPHYLACTIC VACCINATION AND INOCULATION AGAINST INFLUENZA: Primary | ICD-10-CM

## 2019-10-18 DIAGNOSIS — Z00.00 MEDICARE ANNUAL WELLNESS VISIT, SUBSEQUENT: ICD-10-CM

## 2019-10-18 DIAGNOSIS — E11.42 TYPE 2 DIABETES MELLITUS WITH DIABETIC POLYNEUROPATHY, WITHOUT LONG-TERM CURRENT USE OF INSULIN (H): ICD-10-CM

## 2019-10-18 DIAGNOSIS — E78.5 HYPERLIPIDEMIA LDL GOAL <100: ICD-10-CM

## 2019-10-18 PROCEDURE — 99397 PER PM REEVAL EST PAT 65+ YR: CPT | Mod: 25 | Performed by: INTERNAL MEDICINE

## 2019-10-18 PROCEDURE — 99213 OFFICE O/P EST LOW 20 MIN: CPT | Mod: 25 | Performed by: INTERNAL MEDICINE

## 2019-10-18 PROCEDURE — G0008 ADMIN INFLUENZA VIRUS VAC: HCPCS | Performed by: INTERNAL MEDICINE

## 2019-10-18 PROCEDURE — 90662 IIV NO PRSV INCREASED AG IM: CPT | Performed by: INTERNAL MEDICINE

## 2019-10-18 ASSESSMENT — ANXIETY QUESTIONNAIRES
IF YOU CHECKED OFF ANY PROBLEMS ON THIS QUESTIONNAIRE, HOW DIFFICULT HAVE THESE PROBLEMS MADE IT FOR YOU TO DO YOUR WORK, TAKE CARE OF THINGS AT HOME, OR GET ALONG WITH OTHER PEOPLE: NOT DIFFICULT AT ALL
2. NOT BEING ABLE TO STOP OR CONTROL WORRYING: NOT AT ALL
GAD7 TOTAL SCORE: 0
1. FEELING NERVOUS, ANXIOUS, OR ON EDGE: NOT AT ALL
7. FEELING AFRAID AS IF SOMETHING AWFUL MIGHT HAPPEN: NOT AT ALL
5. BEING SO RESTLESS THAT IT IS HARD TO SIT STILL: NOT AT ALL
3. WORRYING TOO MUCH ABOUT DIFFERENT THINGS: NOT AT ALL
6. BECOMING EASILY ANNOYED OR IRRITABLE: NOT AT ALL

## 2019-10-18 ASSESSMENT — PATIENT HEALTH QUESTIONNAIRE - PHQ9
SUM OF ALL RESPONSES TO PHQ QUESTIONS 1-9: 0
5. POOR APPETITE OR OVEREATING: NOT AT ALL

## 2019-10-18 ASSESSMENT — PAIN SCALES - GENERAL: PAINLEVEL: NO PAIN (0)
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
## 2019-10-18 NOTE — TELEPHONE ENCOUNTER
Reason for Call:  Request for results:    Name of test or procedure: holter monitor     Date of test of procedure: 9/30    Location of the test or procedure: [facility] to leave the result message on voice mail or with a family member? YES    Phone number Patient can be reached at:  Cell number on file:    Telephone Information:   Mobile [phone]       Additional comments: Pt forgot to ask about these results at her OV today.     Call taken on 10/18/2019 at 11:28 AM by Cristal Kapoor

## 2019-10-19 ASSESSMENT — ANXIETY QUESTIONNAIRES: GAD7 TOTAL SCORE: 0

## 2019-10-21 DIAGNOSIS — G45.9 TIA (TRANSIENT ISCHEMIC ATTACK): ICD-10-CM

## 2019-10-21 RX ORDER — CLOPIDOGREL BISULFATE 75 MG/1
75 TABLET ORAL DAILY
Qty: 90 TABLET | Refills: 3 | Status: SHIPPED | OUTPATIENT
Start: 2019-10-21 | End: 2020-08-19

## 2019-10-21 NOTE — TELEPHONE ENCOUNTER
Reason for Call:  Medication or medication refill:    Do you use a [facility]?  Name of the pharmacy and phone number for the current request:  [facility]    Name of the medication requested: clopidogrel (PLAVIX) 75 MG tablet and atorvastatin (LIPITOR) 40 MG tablet    Other request: Patient would like a 90 day supply of both of these medications. Please advise.     Can we leave a detailed message on this number? YES    Phone number patient can be reached at: Home number on file [phone] (home)    Best Time:     Call taken on 10/21/2019 at 7:17 AM by Sylvia Greene

## 2019-10-25 RX ORDER — ATORVASTATIN CALCIUM 40 MG/1
40 TABLET, FILM COATED ORAL EVERY EVENING
Qty: 90 TABLET | Refills: 3 | Status: SHIPPED | OUTPATIENT
Start: 2019-10-25 | End: 2020-08-19

## 2019-10-25 NOTE — TELEPHONE ENCOUNTER
"lipitor  Last Written Prescription Date:  09/30/2019  Last Fill Quantity: 30,  # refills: 0   Last office visit: 10/18/2019 with prescribing provider:    Future Office Visit:   Next 5 appointments (look out 90 days)    Nov 14, 2019  8:00 AM CST  Office Visit with Aron Villa DO  [facility] ([facility]) [address] [facility] [id number] [phone]      Prescription approved per [facility] Refill Protocol.  Requested Prescriptions   Pending Prescriptions Disp Refills     atorvastatin (LIPITOR) 40 MG tablet 30 tablet 0     Sig: Take 1 tablet (40 mg) by mouth every evening       Statins Protocol Passed - 10/25/2019 12:00 PM        Passed - LDL on file in past 12 months     Recent Labs   Lab Test 09/30/19  0548   *             Passed - No abnormal creatine kinase in past 12 months     Recent Labs   Lab Test 09/29/19  1451                   Passed - Recent (12 mo) or future (30 days) visit within the authorizing provider's specialty     Patient has had an office visit with the authorizing provider or a provider within the authorizing providers department within the previous 12 mos or has a future within next 30 days. See \"Patient Info\" tab in inbasket, or \"Choose Columns\" in Meds & Orders section of the refill encounter.              Passed - Medication is active on med list        Passed - Patient is age 18 or older        Passed - No active pregnancy on record        Passed - No positive pregnancy test in past 12 months      Signed Prescriptions Disp Refills    clopidogrel (PLAVIX) 75 MG tablet 90 tablet 3     Sig: Take 1 tablet (75 mg) by mouth daily       There is no refill protocol information for this order          "

## 2019-11-13 NOTE — TELEPHONE ENCOUNTER
Has this been completed? If yes, please close encounter.  Thank you,  Cristal Kapoor- Patient Representative

## 2019-11-14 ENCOUNTER — OFFICE VISIT (OUTPATIENT)
Dept: FAMILY MEDICINE | Facility: OTHER | Age: 79
End: 2019-11-14
Payer: COMMERCIAL

## 2019-11-14 VITALS
SYSTOLIC BLOOD PRESSURE: 116 MMHG | OXYGEN SATURATION: 96 % | WEIGHT: 160.2 LBS | TEMPERATURE: 97.4 F | RESPIRATION RATE: 18 BRPM | HEART RATE: 78 BPM | DIASTOLIC BLOOD PRESSURE: 72 MMHG | BODY MASS INDEX: 26.66 KG/M2

## 2019-11-14 DIAGNOSIS — E78.5 HYPERLIPIDEMIA LDL GOAL <100: ICD-10-CM

## 2019-11-14 DIAGNOSIS — E11.42 TYPE 2 DIABETES MELLITUS WITH DIABETIC POLYNEUROPATHY, WITHOUT LONG-TERM CURRENT USE OF INSULIN (H): Primary | ICD-10-CM

## 2019-11-14 DIAGNOSIS — I47.10 SVT (SUPRAVENTRICULAR TACHYCARDIA) (H): ICD-10-CM

## 2019-11-14 LAB
CREAT UR-MCNC: 128 MG/DL
MICROALBUMIN UR-MCNC: 54 MG/L
MICROALBUMIN/CREAT UR: 42.19 MG/G CR (ref 0–25)

## 2019-11-14 PROCEDURE — 99207 C FOOT EXAM  NO CHARGE: CPT | Performed by: INTERNAL MEDICINE

## 2019-11-14 PROCEDURE — 99214 OFFICE O/P EST MOD 30 MIN: CPT | Performed by: INTERNAL MEDICINE

## 2019-11-14 PROCEDURE — 82043 UR ALBUMIN QUANTITATIVE: CPT | Performed by: INTERNAL MEDICINE

## 2019-11-14 ASSESSMENT — PAIN SCALES - GENERAL: PAINLEVEL: NO PAIN (0)

## 2019-11-14 NOTE — PROGRESS NOTES
Subjective     Charlotte Snow is a 79 year old female who presents to clinic today for the following health issues:    HPI   Chief Complaint   Patient presents with     Diabetes     follow up lab results and holter                     Chief Complaint         The patient is a pleasant 79-year-old female who presents today for follow-up of some cardiac arrhythmia.  Holter monitor was performed which demonstrated strangely absolutely no ventricular ectopy.  She did have a couple very brief runs of SVT which were asymptomatic.  This would suggest that may be her symptoms were the result of SVT but at this point, there is no reason to change her therapy as it was minimally significant.  She has been under a great deal of stress as her  is currently going through the induction of chemotherapy for pancreatic cancer.  She states that she is getting adequate sleep and does have good family support.  She does have a history of type 2 diabetes and is taking metformin without difficulty.  She is no longer taking the Actos and notes her blood sugars are still generally under 125.  She did have some tolerance issues with Actos.  She is appropriately on a statin, aspirin, and is allergic to ACE inhibitors and ARB's.                              Review of Systems       LUNGS: Pt denies: cough, excess sputum, hemoptysis, or shortness of breath.   HEART: Pt denies: chest pain, arrhythmia, syncope, tachy or bradyarrhythmia.   GI: Pt denies: nausea, vomiting, diarrhea, constipation, melena, or hematochezia.   NEURO: Pt denies: seizures, strokes, diplopia, weakness, paraesthesias, or paralysis.   SKIN: Pt denies: itching, rashes, discoloration, or specific lesions of concern. Denies recent hair loss.   PSYCH: The patient denies significant depression, anxiety, mood imbalance. Specifically denies any suicidal ideation.                                     Examination    /72 (BP Location: Right arm, Patient Position:  Sitting, Cuff Size: Adult Regular)   Pulse 78   Temp 97.4  F (36.3  C) (Temporal)   Resp 18   Wt 72.7 kg (160 lb 3.2 oz)   SpO2 96%   BMI 26.66 kg/m       Constitutional: The patient appears to be in no acute distress. The patient appears to be adequately hydrated. No acute respiratory or hemodynamic distress is noted at this time.   LUNGS: clear bilaterally, airflow is brisk, no intercostal retraction or stridor is noted. No coughing is noted during visit.   HEART:  regular without rubs, clicks, gallops, or murmurs. PMI is nondisplaced. Upstrokes are brisk. S1,S2 are heard.   GI: Abdomen is soft, without rebound, guarding or tenderness. Bowel sounds are appropriate. No renal bruits are heard.   NEURO: Pt is alert and appropriate. No neurologic lateralization is noted. Cranial nerves 2-12 are intact. Peripheral sensory and motor function are grossly normal.    SKIN:  warm and dry. No erythema, or rashes are noted. No specific lesions of concern are noted.    PSYCH: The patient appears grossly appropriate. Maintains good eye contact, does not have any jittery or atypical motion. Displays appropriate affect.   Feet: no evidence of skin breakdown or ulceration is noted.  Sensation is intact to monofilament and vibration.  Pulses are strong, capillary refill is brisk.                                         Decision Making  1. Type 2 diabetes mellitus with diabetic polyneuropathy, without long-term current use of insulin (H)  Continue current medication and diet  - Albumin Random Urine Quantitative with Creat Ratio  - FOOT EXAM    2. Hyperlipidemia LDL goal <100  Continue current meds lipid restriction    3. SVT (supraventricular tachycardia) (H)  Paroxysmal in brief, recommend no further therapy, patient will continue to observe.  If symptoms worsen, would recommend initiating diltiazem.                             FOLLOW UP   I have asked the patient to make an appointment for followup with me in 4  months        I have carefully explained the diagnosis and treatment options to the patient.  The patient has displayed an understanding of the above, and all subsequent questions were answered.      DO JT Thompson    Portions of this note were produced using [employer]  Although every attempt at real-time proof reading has been made, occasional grammar/syntax errors may have been missed.

## 2019-11-19 ENCOUNTER — OFFICE VISIT (OUTPATIENT)
Dept: AUDIOLOGY | Facility: CLINIC | Age: 79
End: 2019-11-19
Payer: COMMERCIAL

## 2019-11-19 DIAGNOSIS — H90.3 SENSORINEURAL HEARING LOSS, BILATERAL: Primary | ICD-10-CM

## 2019-11-19 PROCEDURE — 99207 ZZC NO CHARGE LOS: CPT | Performed by: AUDIOLOGIST

## 2019-11-19 PROCEDURE — V5299 HEARING SERVICE: HCPCS | Performed by: AUDIOLOGIST

## 2019-11-19 NOTE — PROGRESS NOTES
Hearing Aid Check     SUBJECTIVE:  Charlotte Snow is a 79 year old year old female, seen today for a hearing aid check at [facility]. Patient reports she is not hearing as well with her hearing aids, gradual onset.  Hearing aids were purchased through the [employer].        OBJECTIVE:  Otoscopic exam indicates moderate non-occluding wax right and clear left canal.  Removed wax without incident using a lighted curette.    Vacuumed hearing aid mics, replaced  wax filters and cleaned domes.      Listening check revealed appropriate function.      PLAN:   Return to clinic as needed for hearing aid maintenance and programming. If hearing is not improved consider scheduling a hearing evaluation.    Mynor Ordonez Licensed Audiologist #[id number]

## 2019-11-19 NOTE — RESULT ENCOUNTER NOTE
Dear Charlotte, your recent test results are attached.  The microalbumin is slightly elevated suggesting slight protein loss from the kidneys.    Feel free to contact me via the office or My Chart if you have any questions regarding the above.  Sincerely,  DO ALPHONSE ThompsonOI

## 2019-12-06 ENCOUNTER — OFFICE VISIT (OUTPATIENT)
Dept: AUDIOLOGY | Facility: CLINIC | Age: 79
End: 2019-12-06
Payer: COMMERCIAL

## 2019-12-06 DIAGNOSIS — H90.3 SENSORINEURAL HEARING LOSS, BILATERAL: Primary | ICD-10-CM

## 2019-12-06 PROCEDURE — 92557 COMPREHENSIVE HEARING TEST: CPT | Performed by: AUDIOLOGIST

## 2019-12-06 PROCEDURE — 92593 HC HEARING AID CHECK, BINAURAL: CPT | Performed by: AUDIOLOGIST

## 2019-12-06 PROCEDURE — 99207 ZZC NO CHARGE LOS: CPT | Performed by: AUDIOLOGIST

## 2019-12-06 PROCEDURE — 92550 TYMPANOMETRY & REFLEX THRESH: CPT | Performed by: AUDIOLOGIST

## 2019-12-06 NOTE — PROGRESS NOTES
AUDIOLOGY REPORT    SUBJECTIVE:  Charlotte Snow is a 79 year old female who was seen at [facility] for audiologic evaluation, self-referred. Previously evaluation on 10/12/2018 indicated mild to severe sensorineural hearing loss bilaterally.   Mrs. Snow uses binaural Jennifer Halo i90 ERIC hearing aids with her iPhone and lisbeth. The patient denied significant improvement in hearing with hearing aids after recent ear cleaning and hearing aid  maintenance. She is concerned about a decrease in hearing.  They were accompanied today by their .   .   OBJECTIVE:  Otoscopic exam indicates ears are clear of cerumen bilaterally     Pure Tone Thresholds assessed using conventional audiometry with good  reliability from 250-8000 Hz bilaterally using insert earphones     RIGHT:  mild sloping to severe-profound sensorineural hearing loss    LEFT:    mild sloping to severe-profound sensorineural hearing loss    Tympanogram:    RIGHT: Excessive tympanic membrane mobiltiy with positive pressure right (patient did a valsalva  right before tymps she reported this clears her ears and she can hear better)    LEFT:  Excessive tympanic membrane mobiltiy with positive pressure right (patient did a valsalva  right before tymps she reported this clears her ears and she can hear better)    Reflexes (reported by stimulus ear):  RIGHT: Ipsilateral is present at normal levels  RIGHT: Contralateral is present at normal levels  LEFT:   Ipsilateral is present at normal levels  LEFT:   Contralateral is present at normal levels      Speech Reception Threshold:    RIGHT: 30 dB HL    LEFT:   25 dB HL     Word Recognition Score:     RIGHT: 76% at 70 dB HL using NU-6 recorded word list.    LEFT:   80% at 65 dB HL using NU-6 recorded word list.    Hearing aids were connected to programming software and user settings were saved.  Programming adjustments were made to match NAL-NL1 prescriptive speech targets, there was  a good match to target 500 Hz to 3000 Hz (slight under amplification at 4000 Hz).  Discussed use of listen live feature in the car.  May need to switch to more occluding non-custom dome or custom earmolds if further increases are needed.      ASSESSMENT:     ICD-10-CM    1. Sensorineural hearing loss, bilateral H90.3 COMPREHENSIVE HEARING TEST     TYMPANOMETRY AND REFLEX THRESHOLD MEASUREMENTS     HEARING AID CHECK, BINAURAL     Compared to patient's previous audiogram dated 10/12/2018, hearing has remained stable. Today s results were discussed with the patient in detail.     PLAN:  It is recommended that the patient follow up as needed for programming changes and maintenance.  Please call this clinic with questions regarding these results or recommendations.        Mynor Ordonez Licensed Audiologist #[id number]

## 2019-12-09 ENCOUNTER — OFFICE VISIT (OUTPATIENT)
Dept: AUDIOLOGY | Facility: CLINIC | Age: 79
End: 2019-12-09
Payer: COMMERCIAL

## 2019-12-09 DIAGNOSIS — H90.3 SENSORINEURAL HEARING LOSS, BILATERAL: Primary | ICD-10-CM

## 2019-12-09 PROCEDURE — 99207 ZZC NO CHARGE LOS: CPT | Performed by: AUDIOLOGIST

## 2019-12-09 PROCEDURE — V5299 HEARING SERVICE: HCPCS | Performed by: AUDIOLOGIST

## 2019-12-09 NOTE — PROGRESS NOTES
Hearing Aid Check     SUBJECTIVE:  Charlotte Snow is a 79 year old year old female, seen today for a hearing aid check at [facility]. Patient reports overall environmental sounds and speech are too loud even at minimum volume on the lisbeth after programming adjustments to match prescriptive speech targets based on 12/6/2019 hearing evaluation.        OBJECTIVE:  Programming changes were made to decrease all soft, moderate, and loud sounds 3 increments, and decreased below 1000 Hz an additional 2 increments.     PLAN:   Return to clinic as needed for hearing aid maintenance and programming.    Mynor Ordonez Licensed Audiologist #[id number]

## 2020-01-06 ENCOUNTER — OFFICE VISIT (OUTPATIENT)
Dept: AUDIOLOGY | Facility: CLINIC | Age: 80
End: 2020-01-06
Payer: COMMERCIAL

## 2020-01-06 DIAGNOSIS — H90.3 SENSORINEURAL HEARING LOSS, BILATERAL: Primary | ICD-10-CM

## 2020-01-06 PROCEDURE — V5299 HEARING SERVICE: HCPCS | Performed by: AUDIOLOGIST

## 2020-01-06 PROCEDURE — 99207 ZZC NO CHARGE LOS: CPT | Performed by: AUDIOLOGIST

## 2020-01-28 ENCOUNTER — TELEPHONE (OUTPATIENT)
Dept: FAMILY MEDICINE | Facility: OTHER | Age: 80
End: 2020-01-28

## 2020-01-28 DIAGNOSIS — E11.42 TYPE 2 DIABETES MELLITUS WITH DIABETIC POLYNEUROPATHY, WITHOUT LONG-TERM CURRENT USE OF INSULIN (H): Primary | ICD-10-CM

## 2020-01-28 RX ORDER — GLIMEPIRIDE 2 MG/1
2 TABLET ORAL
Qty: 30 TABLET | Refills: 0 | Status: SHIPPED | OUTPATIENT
Start: 2020-01-28 | End: 2020-02-03

## 2020-01-28 NOTE — TELEPHONE ENCOUNTER
Called and spoke to the patient. She said back in October she had a TIA. She was started on Plavix. Patient's insurance called and said there was an interaction between Actos and Plavix. Dr. Villa had patient stop the Actos.     Patient said she has been tracking her BS since and it seems to be going up. She takes her BS in the morning fasting and she has been ranging 136-187. She said normally she is 120 or below. Patient said she walks everyday and eating as she should. She has had no weight gain. Is under a lot of stress with her 's medical issues. Patient is currently on Metformin 750 mg BID.     Patient is wondering if Dr. Villa think she needs to be on something more? Or what she should do. She said she has never had BS readings this high.     Will route to provider to advise.     BING MontanaN, RN  [facility]

## 2020-01-28 NOTE — TELEPHONE ENCOUNTER
Reason for call:  Patient reporting a symptom    Symptom or request: High Blood Sugar     Duration (how long have symptoms been present): last 3 weeks     Have you been treated for this before? Yes    Additional comments: Patient calling stating she had  discontinued pioglitazone (ACTOS) 30 MG table and that she has been tracking her blood sugar and has been coming in between 136-187 for the past 24 days. Please advise if patient should be seen or not.     Phone Number patient can be reached at:  Home number on file [phone] (home)    Best Time:       Can we leave a detailed message on this number:  YES    Call taken on 1/28/2020 at 1:48 PM by Coni Gillette

## 2020-01-28 NOTE — TELEPHONE ENCOUNTER
I just sent a prescription for 1 months worth of Amaryl/glimepiride to the [facility].  She should take this in the morning when it arrives and notify me of her blood sugar progress.    Daisy

## 2020-02-03 ENCOUNTER — TELEPHONE (OUTPATIENT)
Dept: FAMILY MEDICINE | Facility: OTHER | Age: 80
End: 2020-02-03

## 2020-02-03 DIAGNOSIS — E11.42 TYPE 2 DIABETES MELLITUS WITH DIABETIC POLYNEUROPATHY, WITHOUT LONG-TERM CURRENT USE OF INSULIN (H): ICD-10-CM

## 2020-02-03 RX ORDER — GLIMEPIRIDE 2 MG/1
2 TABLET ORAL
Qty: 90 TABLET | Refills: 3 | Status: SHIPPED | OUTPATIENT
Start: 2020-02-03 | End: 2021-01-05

## 2020-02-03 NOTE — TELEPHONE ENCOUNTER
Reason for Call:  Medication or medication refill:    Do you use a [facility]?  Name of the pharmacy and phone number for the current request:  [facility]     Name of the medication requested: glimepiride (AMARYL) 2 MG tablet    Other request: Patient calling [facility] did not receive last request for this medication. Please resend      Can we leave a detailed message on this number? YES    Phone number patient can be reached at: Home number on file [phone] (home)    Best Time:      Call taken on 2/3/2020 at 2:08 PM by Coni Gillette

## 2020-02-17 ENCOUNTER — TELEPHONE (OUTPATIENT)
Dept: FAMILY MEDICINE | Facility: OTHER | Age: 80
End: 2020-02-17

## 2020-02-17 NOTE — TELEPHONE ENCOUNTER
"I am afraid that I cannot call over a prescription for tramadol as:  \"The use of opioid medications is no longer indicated for the treatment of chronic musculoskeletal pain\".    If she would like to set up an appointment to discuss the various options and alternatives, we can certainly accommodate her.    Daisy"

## 2020-02-17 NOTE — TELEPHONE ENCOUNTER
Reason for Call:  Other     Detailed comments: patient was to call with her diabetic numbers once she started her new medication Glimepiride.  They are as follows:    2/12 - 130  2/13 - 126  2/14 - 129  2/15 - 125  2/16 - 123  2/17 - 113    She also started having severe back pain yesterday.  She has been taking Tylenol 500 - 2 pills as often as she can and it is not helping.  She would like a prescription for Tramadol sent to [facility] in [place].  She states she was on that for her knee and hip pain and it worked well for her.     Please call her with any questions or if she needs to make an appointment.    Phone Number Patient can be reached at: Home number on file [phone] (home)    Best Time: any    Can we leave a detailed message on this number? YES    Call taken on 2/17/2020 at 3:36 PM by Brigido Boyd

## 2020-02-19 ENCOUNTER — OFFICE VISIT (OUTPATIENT)
Dept: FAMILY MEDICINE | Facility: OTHER | Age: 80
End: 2020-02-19
Payer: COMMERCIAL

## 2020-02-19 VITALS
OXYGEN SATURATION: 97 % | RESPIRATION RATE: 16 BRPM | TEMPERATURE: 97.9 F | BODY MASS INDEX: 27.12 KG/M2 | WEIGHT: 163 LBS | DIASTOLIC BLOOD PRESSURE: 78 MMHG | HEART RATE: 97 BPM | SYSTOLIC BLOOD PRESSURE: 132 MMHG

## 2020-02-19 DIAGNOSIS — I47.10 SVT (SUPRAVENTRICULAR TACHYCARDIA) (H): ICD-10-CM

## 2020-02-19 DIAGNOSIS — M53.3 SACROILIAC JOINT PAIN: Primary | ICD-10-CM

## 2020-02-19 DIAGNOSIS — E11.42 TYPE 2 DIABETES MELLITUS WITH DIABETIC POLYNEUROPATHY, WITHOUT LONG-TERM CURRENT USE OF INSULIN (H): ICD-10-CM

## 2020-02-19 PROCEDURE — 99213 OFFICE O/P EST LOW 20 MIN: CPT | Mod: 25 | Performed by: INTERNAL MEDICINE

## 2020-02-19 PROCEDURE — 20552 NJX 1/MLT TRIGGER POINT 1/2: CPT | Performed by: INTERNAL MEDICINE

## 2020-02-19 RX ORDER — TRIAMCINOLONE ACETONIDE 40 MG/ML
80 INJECTION, SUSPENSION INTRA-ARTICULAR; INTRAMUSCULAR ONCE
Status: COMPLETED | OUTPATIENT
Start: 2020-02-19 | End: 2020-02-19

## 2020-02-19 RX ADMIN — TRIAMCINOLONE ACETONIDE 80 MG: 40 INJECTION, SUSPENSION INTRA-ARTICULAR; INTRAMUSCULAR at 14:51
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
## 2020-03-09 DIAGNOSIS — G62.9 PERIPHERAL POLYNEUROPATHY: ICD-10-CM

## 2020-03-09 RX ORDER — GABAPENTIN 300 MG/1
CAPSULE ORAL
Qty: 270 CAPSULE | Refills: 3 | Status: SHIPPED | OUTPATIENT
Start: 2020-03-09 | End: 2021-01-12

## 2020-03-09 NOTE — TELEPHONE ENCOUNTER
Gabapentin 300 MG       Last Written Prescription Date:  10/3/18  Last Fill Quantity: 270,   # refills: 3  Last Office Visit: 2/19/2020  Future Office visit:       Routing refill request to provider for review/approval because:  Drug not on the [facility], P or [facility] refill protocol or controlled substance

## 2020-04-08 ENCOUNTER — TELEPHONE (OUTPATIENT)
Dept: FAMILY MEDICINE | Facility: OTHER | Age: 80
End: 2020-04-08

## 2020-04-09 NOTE — TELEPHONE ENCOUNTER
Reason for call:  Other   Patient called regarding (reason for call): Forms  Additional comments: Patient filled out two(2) separate health care directive listing spouse and three(3) children. One have been uploaded but the second form listing the 3rd child have not. Please verify and upload into patient chart.     Phone number to reach patient:  Please call joan Yeh at [phone]               Best Time:  any  Can we leave a detailed message on this number?  YES    Travel screening: Not Applicable

## 2020-05-02 DIAGNOSIS — E11.42 TYPE 2 DIABETES MELLITUS WITH DIABETIC POLYNEUROPATHY, WITHOUT LONG-TERM CURRENT USE OF INSULIN (H): ICD-10-CM

## 2020-05-05 RX ORDER — METFORMIN HYDROCHLORIDE 750 MG/1
TABLET, EXTENDED RELEASE ORAL
Qty: 180 TABLET | Refills: 0 | Status: SHIPPED | OUTPATIENT
Start: 2020-05-05 | End: 2020-12-31

## 2020-05-05 NOTE — TELEPHONE ENCOUNTER
Routing refill request to provider for review/approval because:  Labs not current:  A1C    BING MontanaN, RN  [facility]

## 2020-05-26 ENCOUNTER — TELEPHONE (OUTPATIENT)
Dept: FAMILY MEDICINE | Facility: OTHER | Age: 80
End: 2020-05-26

## 2020-05-26 DIAGNOSIS — M79.676 PAIN OF TOE, UNSPECIFIED LATERALITY: Primary | ICD-10-CM

## 2020-06-01 ENCOUNTER — OFFICE VISIT (OUTPATIENT)
Dept: PODIATRY | Facility: CLINIC | Age: 80
End: 2020-06-01
Payer: COMMERCIAL

## 2020-06-01 VITALS
SYSTOLIC BLOOD PRESSURE: 118 MMHG | HEIGHT: 64 IN | WEIGHT: 167 LBS | DIASTOLIC BLOOD PRESSURE: 66 MMHG | BODY MASS INDEX: 28.51 KG/M2 | TEMPERATURE: 97.4 F

## 2020-06-01 DIAGNOSIS — L60.3 ONYCHODYSTROPHY: ICD-10-CM

## 2020-06-01 DIAGNOSIS — L03.031 PARONYCHIA OF THIRD TOE, RIGHT: Primary | ICD-10-CM

## 2020-06-01 DIAGNOSIS — E11.42 TYPE 2 DIABETES MELLITUS WITH DIABETIC POLYNEUROPATHY, WITHOUT LONG-TERM CURRENT USE OF INSULIN (H): ICD-10-CM

## 2020-06-01 PROCEDURE — 99213 OFFICE O/P EST LOW 20 MIN: CPT | Mod: 25 | Performed by: PODIATRIST

## 2020-06-01 PROCEDURE — 11721 DEBRIDE NAIL 6 OR MORE: CPT | Performed by: PODIATRIST

## 2020-06-01 ASSESSMENT — PAIN SCALES - GENERAL: PAINLEVEL: NO PAIN (0)

## 2020-06-01 ASSESSMENT — MIFFLIN-ST. JEOR: SCORE: 1214.02

## 2020-06-01 NOTE — LETTER
"    6/1/2020         RE: Charlotte Snow  [address]  [facility] [address]        Dear Colleague,    Thank you for referring your patient, Charlotte Snow, to the [facility]. Please see a copy of my visit note below.    Chief Complaint   Patient presents with     Consult     Right 3rd toe; LOV 7/3/2019 - NEW ISSUE     Diabetes     type 2     Referral     Dr. Villa,        HPI:  Patient has been completely monitoring diagnosis of bruise over recent drainage redness swelling pain about the right third toe.  Present over last 3 to 4 weeks getting worse.  Requesting help.    EXAM:Vitals: /66 (BP Location: Left arm, Patient Position: Sitting, Cuff Size: Adult Regular)   Temp 97.4  F (36.3  C) (Temporal)   Ht 1.62 m (5' 3.78\")   Wt 75.8 kg (167 lb)   BMI 28.86 kg/m    BMI= Body mass index is 28.86 kg/m .    General appearance: Patient is alert and fully cooperative with history & exam.  No sign of distress is noted during the visit.     Psychiatric: Affect is pleasant & appropriate.  Patient appears motivated to improve health.     Respiratory: Breathing is regular & unlabored while sitting.     HEENT: Hearing is intact to spoken word.  Speech is clear.  No gross evidence of visual impairment that would impact ambulation.     Vascular: DP & PT pulses are intact & regular bilaterally.  No significant edema or varicosities noted.  CFT and skin temperature is normal to both lower extremities.     Neurologic: Lower extremity sensation is intact to light touch.  No evidence of weakness or contracture in the lower extremities.  No evidence of neuropathy.    Dermatologic: Adequate texture turgor tone about the integument.  Both great toenails bilateral feet are very dystrophic thickened discolored lysing.  Much of the nail plate on the left foot is no longer attached with no bleeding drainage problems however the right toenail partially detached bleeding and draining.  There is erythema " about the distal phalanx.    Musculoskeletal: Patient is ambulatory without assistive device or brace. Previous hammer toe arthroplasty left 4-5th toes years ago.  Cicatrix is noted over the dorsal fourth and fifth toes.  There is subtle edema and discomfort with very firm palpation and compression of the fourth and fifth toes of the left foot.  Integument is intact.  Subtle deviation laterally likely secondary to some edema in the fourth and fifth toes.    Hemoglobin A1C (%)   Date Value   09/29/2019 6.5 (H)   04/22/2019 7.2 (H)   06/04/2018 6.8 (H)   10/21/2016 6.8 (H)   05/13/2016 7.1 (H)   06/08/2015 7.0 (H)   11/12/2014 6.9 (H)   08/18/2014 7.0 (H)     Creatinine (mg/dL)   Date Value   09/29/2019 0.75   04/22/2019 0.77   06/04/2018 0.84   10/21/2016 0.74   08/05/2016 0.91   05/13/2016 0.57     ASSESSMENT:       ICD-10-CM    1. Paronychia of third toe, right  L03.031 DEBRIDEMENT OF NAILS, 6 OR MORE   2. Onychodystrophy  L60.3 DEBRIDEMENT OF NAILS, 6 OR MORE   3. Type 2 diabetes mellitus with diabetic polyneuropathy, without long-term current use of insulin (H)  E11.42 DEBRIDEMENT OF NAILS, 6 OR MORE        PLAN:  Reviewed patient's chart in [facility].      7/3/2019   Applied compression dressing of the fourth and fifth toes miguel angel splinting.  Stay in the postop shoe that she already has until no pain or edema with firm palpation then progressed to sturdy shoes and all activities as tolerated.    This patient has questions regarding diabetic shoes.  Unfortunately she does not meet class findings at this time.  She does not qualify for diabetic shoes at this time.  I am unable to document peripheral neuropathy or significant class findings associated with peripheral vascular disease secondary to diabetes.  She does have deformity and she does have diabetes but she does not meet the class findings necessary for me to document authorization of diabetic shoes.  I answered all questions in regards to this.  We discussed  appropriate shoe gear for her.  We reviewed hammertoe deformities as she has questions and recent symptoms about the right second hammertoe.  We discussed that she is had hammertoe procedures on the left fourth and fifth toes however she does not recall that.  The previous cicatrix is noted in the head of the proximal phalanx of the fourth and fifth toes are removed on radiographs indicating previous arthroplasty but she cannot recall this.    Follow-up with me once yearly for diabetic foot exam or if her left fourth and fifth toes do not resolve.    All questions were answered to their satisfaction.    6/1/2020  I debrided all 10 toenails manually and mechanically.  Most of the nail plates, probably 80% of the nail plate, about both third toes were debrided today with a nail nipper.  Discussed and recommended more frequent nail debridement to prevent the nails from avulsing.  Written instructions on how to obtain help with his hygiene at home.      Nikolas Pinzon DPM            Again, thank you for allowing me to participate in the care of your patient.        Sincerely,        Nikolas Pinzon DPM

## 2020-06-01 NOTE — PATIENT INSTRUCTIONS
"Nail Debridement    A high quality instrument makes trimming toenails MUCH easier.  Search ebay for any 5\" nail nipper manufactured by reliable brands such as Miltex, Integra or Jarit as these quality instruments will help manage difficult nails more effectively and comfortably. We use Miltex -SS.  A physician is not necessary to trim nails even if you are taking blood thinners or are diabetic.  Your family or care givers may help manage your toenails.      Trim or sand the nails once weekly.  Do not wait until they are long and painful or trimming will become too difficult and painful and will increase your risk of complications or infection.  A course file or 120 grit sandpaper on a sanding block can be helpful.  For very thick nails many people prefer battery operated camarena such as an Amope', Personal Pedi and Emjoi for regular use or heavy painful callouses or thick toenails.    Trim or skive any portion of nail that is thick, loose, crumbling, or not well attached. Do not tear the nail away, but rather cut them with a nail nipperor sand or sand them down.  You may follow up with your Podiatric Physician if you have pain, bleeding, infection, questions or other concerns.      You may also contact the following Registered Nurses for further help with nail debridement and minor hygiene concnerns.  They may come to your home or meet them at their clinic to trim your toenails and soak your feet, as well as monitor for any complications that would require evaluation by a Physician.      [facility]  Natasha Hayden RN  Office [phone]    [facility]  Fely Tamayo RN  [phone]   Call or text for appointment  Some home visits and has a clinic at:  [address] [place] [address]    [facility]  Vaughn Rubalcava RN  [phone]    [facility]  [phone]  [facility]  [phone]  [email or web address] - [facility]    For up to date list and to find foot care nurses in other communities visit American [employer] Nurses Association website:  afcna.org.     Calluses, Corns, IPKs, Porokeratosis    When there is excessive friction or pressure on the skin, the body responds by making the skin thicker.  While this may protect the deeper structures, the thickened skin can take up more space and thus increase pressure over a bony prominence or become an open sore or skin ulcer as this skin becomes less flexible.    Flat, diffuse thickening are simple calluses and they are usually caused by friction.  Often these are the result of rubbing on a shoe or going barefoot.    Calluses with a central core between the toes are called corns.  These often result from prominent joints on adjacent toes rubbing together.  Theses are often a symptom of bone malalignment and will usually recur unless the underlying bones are addressed.    Many of these lesions can be kept comfortable with routine maintenance. This consists of filing them with a Ped Egg, callus file, or 120 grit sandpaper on a block, every day during your bath or shower.  Most people prefer battery operated camarena such as an Amope', Personal Pedi and Emjoi for regular use or heavy painful callouses.  Heavy creams or ointments can be applied 1-2 times every day to keep them soft. Toe spacers can be used for corns, gel pads can be used for other lesions on the bottom of the foot. If there is a deformity noted, such as a prominent bone, often this can be addressed to minimize recurrence. However, sometimes the pressure and lesion simply migrates to another spot after surgery, so it is not a guaranteed cure.     If you have severe callouses and cracking, you may apply heavy ointments that you scoop up such as Cetaphil cream, Eucerin, Aquaphor or Vaseline.  Be sure to obtain cream or ointment in these brands and not lotion  (lotion is water based and not durable enough for feet). For more aggressive help apply heavy creams or ointment under occlusive dressings such as Saran Wrap or Jelly Feet while sleeping.   Jelly Feet can be obtained at www.jellyfeet.com.     To be successful with managing hyperkeratotic skin, you must manage hygiene daily.  Apply the cream once or twice EVERY day.  At your bath or shower time is the easiest time to work on this when skin is most soft.  There is no medical or surgical treatment that will absolutely eliminate many of these symptoms.      Ped[employer]ix is a reliable source for all sorts of foot pads, cushions, or interdigital spacers and foot appliances. Go to [email or web address] or request a catalog at 6-424-Microinox.        Please call with any additional questions.

## 2020-06-01 NOTE — PROGRESS NOTES
"Chief Complaint   Patient presents with     Consult     Right 3rd toe; LOV 7/3/2019 - NEW ISSUE     Diabetes     type 2     Referral     Dr. Daisy DO       HPI:  Patient has been completely monitoring diagnosis of bruise over recent drainage redness swelling pain about the right third toe.  Present over last 3 to 4 weeks getting worse.  Requesting help.    EXAM:Vitals: /66 (BP Location: Left arm, Patient Position: Sitting, Cuff Size: Adult Regular)   Temp 97.4  F (36.3  C) (Temporal)   Ht 1.62 m (5' 3.78\")   Wt 75.8 kg (167 lb)   BMI 28.86 kg/m    BMI= Body mass index is 28.86 kg/m .    General appearance: Patient is alert and fully cooperative with history & exam.  No sign of distress is noted during the visit.     Psychiatric: Affect is pleasant & appropriate.  Patient appears motivated to improve health.     Respiratory: Breathing is regular & unlabored while sitting.     HEENT: Hearing is intact to spoken word.  Speech is clear.  No gross evidence of visual impairment that would impact ambulation.     Vascular: DP & PT pulses are intact & regular bilaterally.  No significant edema or varicosities noted.  CFT and skin temperature is normal to both lower extremities.     Neurologic: Lower extremity sensation is intact to light touch.  No evidence of weakness or contracture in the lower extremities.  No evidence of neuropathy.    Dermatologic: Adequate texture turgor tone about the integument.  Both great toenails bilateral feet are very dystrophic thickened discolored lysing.  Much of the nail plate on the left foot is no longer attached with no bleeding drainage problems however the right toenail partially detached bleeding and draining.  There is erythema about the distal phalanx.    Musculoskeletal: Patient is ambulatory without assistive device or brace. Previous hammer toe arthroplasty left 4-5th toes years ago.  Cicatrix is noted over the dorsal fourth and fifth toes.  There is subtle edema and " discomfort with very firm palpation and compression of the fourth and fifth toes of the left foot.  Integument is intact.  Subtle deviation laterally likely secondary to some edema in the fourth and fifth toes.    Hemoglobin A1C (%)   Date Value   09/29/2019 6.5 (H)   04/22/2019 7.2 (H)   06/04/2018 6.8 (H)   10/21/2016 6.8 (H)   05/13/2016 7.1 (H)   06/08/2015 7.0 (H)   11/12/2014 6.9 (H)   08/18/2014 7.0 (H)     Creatinine (mg/dL)   Date Value   09/29/2019 0.75   04/22/2019 0.77   06/04/2018 0.84   10/21/2016 0.74   08/05/2016 0.91   05/13/2016 0.57     ASSESSMENT:       ICD-10-CM    1. Paronychia of third toe, right  L03.031 DEBRIDEMENT OF NAILS, 6 OR MORE   2. Onychodystrophy  L60.3 DEBRIDEMENT OF NAILS, 6 OR MORE   3. Type 2 diabetes mellitus with diabetic polyneuropathy, without long-term current use of insulin (H)  E11.42 DEBRIDEMENT OF NAILS, 6 OR MORE        PLAN:  Reviewed patient[facility] in [facility].      7/3/2019   Applied compression dressing of the fourth and fifth toes miguel angel splinting.  Stay in the postop shoe that she already has until no pain or edema with firm palpation then progressed to sturdy shoes and all activities as tolerated.    This patient has questions regarding diabetic shoes.  Unfortunately she does not meet class findings at this time.  She does not qualify for diabetic shoes at this time.  I am unable to document peripheral neuropathy or significant class findings associated with peripheral vascular disease secondary to diabetes.  She does have deformity and she does have diabetes but she does not meet the class findings necessary for me to document authorization of diabetic shoes.  I answered all questions in regards to this.  We discussed appropriate shoe gear for her.  We reviewed hammertoe deformities as she has questions and recent symptoms about the right second hammertoe.  We discussed that she is had hammertoe procedures on the left fourth and fifth toes however she does not  recall that.  The previous cicatrix is noted in the head of the proximal phalanx of the fourth and fifth toes are removed on radiographs indicating previous arthroplasty but she cannot recall this.    Follow-up with me once yearly for diabetic foot exam or if her left fourth and fifth toes do not resolve.    All questions were answered to their satisfaction.    6/1/2020  I debrided all 10 toenails manually and mechanically.  Most of the nail plates, probably 80% of the nail plate, about both third toes were debrided today with a nail nipper.  Discussed and recommended more frequent nail debridement to prevent the nails from avulsing.  Written instructions on how to obtain help with his hygiene at home.      iNkolas Pinzon DPM

## 2020-07-20 DIAGNOSIS — G45.9 TIA (TRANSIENT ISCHEMIC ATTACK): ICD-10-CM

## 2020-07-21 RX ORDER — CLOPIDOGREL BISULFATE 75 MG/1
TABLET ORAL
Qty: 90 TABLET | Refills: 3 | OUTPATIENT
Start: 2020-07-21

## 2020-07-21 RX ORDER — ATORVASTATIN CALCIUM 40 MG/1
40 TABLET, FILM COATED ORAL EVERY EVENING
Qty: 90 TABLET | Refills: 3 | OUTPATIENT
Start: 2020-07-21

## 2020-07-21 NOTE — TELEPHONE ENCOUNTER
Prescription was sent 10/25/19 for #90 with 3 refills.  Prescription was sent 10/21/19 for #90 with 3 refills.  Pharmacy notified via E-Prescribe refusal.     BING MontanaN, RN  [facility]

## 2020-08-19 DIAGNOSIS — G45.9 TIA (TRANSIENT ISCHEMIC ATTACK): ICD-10-CM

## 2020-08-19 RX ORDER — ATORVASTATIN CALCIUM 40 MG/1
40 TABLET, FILM COATED ORAL EVERY EVENING
Qty: 90 TABLET | Refills: 3 | OUTPATIENT
Start: 2020-08-19

## 2020-08-19 RX ORDER — CLOPIDOGREL BISULFATE 75 MG/1
75 TABLET ORAL DAILY
Qty: 90 TABLET | Refills: 3 | Status: SHIPPED | OUTPATIENT
Start: 2020-08-19 | End: 2021-06-18

## 2020-08-19 RX ORDER — ATORVASTATIN CALCIUM 40 MG/1
40 TABLET, FILM COATED ORAL EVERY EVENING
Qty: 90 TABLET | Refills: 3 | Status: SHIPPED | OUTPATIENT
Start: 2020-08-19 | End: 2021-06-18

## 2020-08-19 RX ORDER — CLOPIDOGREL BISULFATE 75 MG/1
TABLET ORAL
Qty: 90 TABLET | Refills: 3 | OUTPATIENT
Start: 2020-08-19

## 2020-08-19 NOTE — TELEPHONE ENCOUNTER
"RX filled today by provider  PLavix  Last Written Prescription Date:  8/19/2020  Last Fill Quantity: 90,  # refills: 3   Last office visit: 2/19/2020 with prescribing provider:  Matushin   Lipitor  Last Written Prescription Date:  8/19/2020  Last Fill Quantity: 90,  # refills: 3   Last office visit: 2/19/2020 with prescribing provider:  matushin  Future Office Visit:      Requested Prescriptions   Pending Prescriptions Disp Refills     atorvastatin (LIPITOR) 40 MG tablet [Pharmacy Med Name: ATORVASTATIN CALCIUM 40 MG Tablet] 90 tablet 3     Sig: TAKE 1 TABLET (40 MG) BY MOUTH EVERY EVENING       Statins Protocol Passed - 8/19/2020  4:17 PM        Passed - LDL on file in past 12 months     Recent Labs   Lab Test 09/30/19  0548   *             Passed - No abnormal creatine kinase in past 12 months     Recent Labs   Lab Test 09/29/19  1451                   Passed - Recent (12 mo) or future (30 days) visit within the authorizing provider's specialty     Patient has had an office visit with the authorizing provider or a provider within the authorizing providers department within the previous 12 mos or has a future within next 30 days. See \"Patient Info\" tab in inbasket, or \"Choose Columns\" in Meds & Orders section of the refill encounter.              Passed - Medication is active on med list        Passed - Patient is age 18 or older        Passed - No active pregnancy on record        Passed - No positive pregnancy test in past 12 months           clopidogrel (PLAVIX) 75 MG tablet [Pharmacy Med Name: CLOPIDOGREL 75 MG Tablet] 90 tablet 3     Sig: TAKE 1 TABLET EVERY DAY       Plavix Failed - 8/19/2020  4:17 PM        Failed - Normal HGB on file in past 12 months     Recent Labs   Lab Test 09/29/19  1451   HGB 10.7*               Passed - No active PPI on record unless is Protonix        Passed - Normal Platelets on file in past 12 months     Recent Labs   Lab Test 09/29/19  1451                  " "Passed - Recent (12 mo) or future (30 days) visit within the authorizing provider's specialty     Patient has had an office visit with the authorizing provider or a provider within the authorizing providers department within the previous 12 mos or has a future within next 30 days. See \"Patient Info\" tab in inbasket, or \"Choose Columns\" in Meds & Orders section of the refill encounter.              Passed - Medication is active on med list        Passed - Patient is age 18 or older        Passed - No active pregnancy on record        Passed - No positive pregnancy test in past 12 months           Maria Alejandra Romero RN on 8/19/2020 at 4:54 PM    "

## 2020-08-19 NOTE — TELEPHONE ENCOUNTER
Patient calling states that [employer] is not filling these Rx's they stated they are waiting for the doctor to approve them. Please call to advise

## 2020-08-24 NOTE — PROGRESS NOTES
Dx: L Total shoulder         Insurance (Authorized # of Visits):  5/12           Authorizing Physician: Dr. Maral Tomlin  Next MD visit: none scheduled  Fall Risk: standard         Precautions: n/a        Surgery sp 6 weeks    Subjective: Patient reports demarco Hearing Aid Check     SUBJECTIVE:  Charlotte Snow is a 79 year old year old female, seen today for a hearing aid check at [facility]. Previous evaluation 12/6/2019 showed mild to moderate to severe sensorineural hearing loss bilaterally. The patient is currently using binaural Jennifer Halo i90 ERIC hearing aids, which were programmed to match prescriptive targets 12/6/2019.  12/9/2019 she requested a decrease, decreased overall soft/moderate/loud 3 increments and below 1000 Hz an additional 2 increments.  After these adjustments the patient reports she is not hearing as well, especially struggled to hear the great grandkids in [employer].     OBJECTIVE:  Programming changes were made to increased soft, moderate, and loud 2 increments.     PLAN:   Return to clinic as needed for hearing aid maintenance and programming.    Mynor Ordonez Licensed Audiologist #[id number]       Total Timed Treatment: 45 min  Total Treatment Time: 45 min

## 2020-10-05 ENCOUNTER — ALLIED HEALTH/NURSE VISIT (OUTPATIENT)
Dept: PHARMACY | Facility: CLINIC | Age: 80
End: 2020-10-05

## 2020-10-05 DIAGNOSIS — Z86.73 HISTORY OF TIA (TRANSIENT ISCHEMIC ATTACK) AND STROKE: ICD-10-CM

## 2020-10-05 DIAGNOSIS — Z78.9 TAKES DIETARY SUPPLEMENTS: ICD-10-CM

## 2020-10-05 DIAGNOSIS — M17.11 PRIMARY OSTEOARTHRITIS OF RIGHT KNEE: ICD-10-CM

## 2020-10-05 DIAGNOSIS — N36.2: ICD-10-CM

## 2020-10-05 DIAGNOSIS — E78.5 HYPERLIPIDEMIA LDL GOAL <100: ICD-10-CM

## 2020-10-05 DIAGNOSIS — E11.42 TYPE 2 DIABETES MELLITUS WITH DIABETIC POLYNEUROPATHY, WITHOUT LONG-TERM CURRENT USE OF INSULIN (H): Primary | ICD-10-CM

## 2020-10-05 PROCEDURE — 99606 MTMS BY PHARM EST 15 MIN: CPT | Performed by: PHARMACIST

## 2020-10-05 PROCEDURE — 99605 MTMS BY PHARM NP 15 MIN: CPT | Performed by: PHARMACIST

## 2020-10-05 RX ORDER — AMPICILLIN TRIHYDRATE 500 MG
1 CAPSULE ORAL DAILY
COMMUNITY
End: 2023-03-27

## 2020-10-05 NOTE — PROGRESS NOTES
MTM ENCOUNTER  SUBJECTIVE/OBJECTIVE:                           Charlotte Snow is a 80 year old female called for an initial visit. She was referred to me from self. Pt has [employer] insurance with [employer].     Patient consented to a telehealth visit: yes  Telemedicine Visit Details  Type of service:  Telephone visit  Start Time: 1:30 PM  End Time: 2:00 PM  Originating Location (pt. Location): Home  Distant Location (provider location):  [facility]  Mode of Communication:  Telephone    Chief Complaint: Comprehensive medication review.    Allergies/ADRs: Reviewed in chart  Tobacco: She reports that she has never smoked. She has never used smokeless tobacco.  Alcohol: none  Caffeine: limited  Activity: tries to stay active  PMH: Reviewed in chart    Medication Adherence/Access: Patient uses pill box(es).  Patient takes medications 2 time(s) per day.   Per patient, misses medication 0 times per week.   Medication barriers: none.   The patient fills medications at [place]: NO, fills medications at [facility].    Type 2 Diabetes:  Pt currently taking glimepiride 2 mg every morning and metformin  mg twice daily. Pt is not experiencing side effects.  Blood sugar monitorin time(s) daily. Ranges (patient reported):  mg/dL  Symptoms of low blood sugar? none  Symptoms of high blood sugar? none  Eye exam: due  Foot exam: up to date  Diet/Exercise: Watches diet and tracks closely. Active in household.   Aspirin: Not taking due to clopidogrel  Statin: Yes: atorvastatin   ACEi/ARB: No.   Urine Albumin:   Lab Results   Component Value Date    UMALCR 42.19 (H) 2019      Lab Results   Component Value Date    A1C 6.5 2019    A1C 7.2 2019    A1C 6.8 2018    A1C 6.8 10/21/2016    A1C 7.1 2016       Hx of stroke/Hyperlipidemia: Current medications include clopidogrel 75 mg daily and atorvastatin 40 mg daily.  Wonders about long term use of these medications.   Patient does self-monitor blood pressure. Home BP monitoring in range of 120's systolic over 70's diastolic.  Pt reports no significant myalgias or other side effects.  BP Readings from Last 3 Encounters:   06/01/20 118/66   02/19/20 132/78   11/14/19 116/72       Recent Labs   Lab Test 09/30/19  0548 04/22/19  0906 08/19/15  0818 08/19/15  0818 06/08/15  0810   CHOL 200* 234*   < > 194 161   HDL 73 69   < > 77 75   * 144*   < > 97 71   TRIG 69 103   < > 102 77   CHOLHDLRATIO  --   --   --  2.5 2.1    < > = values in this interval not displayed.       Pain: Shattered hip and knee in fall.  Pt is currently taking gabapentin 300 mg twice daily (sometimes three times daily), calcium/vitamin D3 daily, glucosamine-chondroitin twice daily (has helped - benefit went away during trial off), and acetaminophen PRN. Finds to be effective. Denies any issues.     Urethral Polyps: Pt is taking alfuzosin ER 10 mg daily. Pt states that this helped with urinary retention secondary to polyps. Started by Dr. Bertrand in urology.  Denies any known side effects.    Supplements: Pt is taking biotin daily (has helped with hair and nails), calcium/vitamin D daily, and a daily multivitamin. Denies any issues.     Today's Vitals: There were no vitals taken for this visit.    Wt Readings from Last 5 Encounters:   06/01/20 167 lb (75.8 kg)   02/19/20 163 lb (73.9 kg)   11/14/19 160 lb 3.2 oz (72.7 kg)   10/18/19 162 lb 9.6 oz (73.8 kg)   09/30/19 164 lb 0.4 oz (74.4 kg)     ASSESSMENT:                              Medication Adherence: No issues identified    Type 2 Diabetes:  Stable. Pt is meeting A1c goal of <8%.    Hx of stroke/Hyperlipidemia: Stable. Due to history of stroke both clopidogrel and statin are indicated.       Pain: Stable per patient.     Urethral Polyps: Stable.     Supplements: Stable.     PLAN:                            1. Continue current therapy     I spent 30 minutes with this patient today. A copy of the visit  note was provided to the patient's primary care provider.    Will follow up in 1 year or sooner if needed.    The patient was sent via [employer] a summary of these recommendations.     Virgil Morgan, ChantelD, [facility]  Medication Therapy Management Pharmacist  Pager: [phone]

## 2020-10-06 ENCOUNTER — ANCILLARY PROCEDURE (OUTPATIENT)
Dept: MAMMOGRAPHY | Facility: OTHER | Age: 80
End: 2020-10-06
Attending: INTERNAL MEDICINE
Payer: COMMERCIAL

## 2020-10-06 DIAGNOSIS — Z12.31 VISIT FOR SCREENING MAMMOGRAM: ICD-10-CM

## 2020-10-06 PROCEDURE — 77067 SCR MAMMO BI INCL CAD: CPT | Performed by: RADIOLOGY

## 2020-10-06 PROCEDURE — 77063 BREAST TOMOSYNTHESIS BI: CPT | Performed by: RADIOLOGY

## 2020-10-08 NOTE — RESULT ENCOUNTER NOTE
Dear Charlotte, your recent test results are attached.    Mammogram is normal.  Recommend annual follow-up.    You will be contacted with any outstanding results when they are available.  Feel free to contact me via the office or My Chart if you have any questions regarding the above.  Sincerely,  DO JT Thompson

## 2020-11-02 ENCOUNTER — TRANSFERRED RECORDS (OUTPATIENT)
Dept: HEALTH INFORMATION MANAGEMENT | Facility: CLINIC | Age: 80
End: 2020-11-02

## 2020-11-02 LAB — RETINOPATHY: NEGATIVE

## 2020-11-04 DIAGNOSIS — E11.42 TYPE 2 DIABETES MELLITUS WITH DIABETIC POLYNEUROPATHY, WITHOUT LONG-TERM CURRENT USE OF INSULIN (H): ICD-10-CM

## 2020-11-05 ENCOUNTER — TELEPHONE (OUTPATIENT)
Dept: PHARMACY | Facility: CLINIC | Age: 80
End: 2020-11-05

## 2020-11-05 NOTE — TELEPHONE ENCOUNTER
Called patient she uses acetaminophen very PRN and it is not very effective. Pain in hands, hips, and knees. Not major issue, but wonders if safe to use Aleve. Due to clopidogrel and kidney function would recommend avoiding Aleve. Recommended taking Tylenol more schedule or in anticipation of increased activity to help control symptoms.     Virgil Morgan, ChantelD, [facility]  Medication Therapy Management Pharmacist  Pager: [phone]

## 2020-11-05 NOTE — TELEPHONE ENCOUNTER
Second attempt made to contact patient. LVM.     Virgil Morgan, ChantelD, [facility]  Medication Therapy Management Pharmacist  Pager: [phone]

## 2020-11-05 NOTE — TELEPHONE ENCOUNTER
Pt called and left message asking about medications for arthritis pain.  She stated that she uses Tylenol, but still has issues.  Was told by someone that naproxen would work better and wants to know if that would be okay to take.  Attempted to call patient to ask more questions and discuss potential drug interactions (I.e clopidogrel) as well as other potential alternatives.     Virgil Morgan, ChantelD, [facility]  Medication Therapy Management Pharmacist  Pager: [phone]

## 2020-11-06 RX ORDER — GLIMEPIRIDE 2 MG/1
2 TABLET ORAL
Qty: 90 TABLET | Refills: 3 | OUTPATIENT
Start: 2020-11-06

## 2020-11-06 NOTE — TELEPHONE ENCOUNTER
Prescription was sent 2/3/2020 for #90 with 3 refills.  Pharmacy notified via E-Prescribe refusal.     BING MontanaN, RN  [facility]

## 2020-12-09 DIAGNOSIS — E11.42 TYPE 2 DIABETES MELLITUS WITH DIABETIC POLYNEUROPATHY, WITHOUT LONG-TERM CURRENT USE OF INSULIN (H): ICD-10-CM

## 2020-12-10 RX ORDER — GLIMEPIRIDE 2 MG/1
2 TABLET ORAL
Qty: 90 TABLET | Refills: 3 | OUTPATIENT
Start: 2020-12-10

## 2020-12-10 NOTE — TELEPHONE ENCOUNTER
Prescription was sent 2/3/2020 for #90 with 3 refills.  Pharmacy notified via E-Prescribe refusal.     BING MontanaN, RN  [facility]

## 2020-12-31 DIAGNOSIS — E11.42 TYPE 2 DIABETES MELLITUS WITH DIABETIC POLYNEUROPATHY, WITHOUT LONG-TERM CURRENT USE OF INSULIN (H): ICD-10-CM

## 2020-12-31 RX ORDER — METFORMIN HYDROCHLORIDE 750 MG/1
TABLET, EXTENDED RELEASE ORAL
Qty: 45 TABLET | Refills: 0 | Status: SHIPPED | OUTPATIENT
Start: 2020-12-31 | End: 2021-01-12

## 2020-12-31 NOTE — TELEPHONE ENCOUNTER
Patient calling on status of refills that had been requested on glimepiride, and gabapentin. Patient stating she had received a call from [employer] of refusal. Informed patient that looking at those meds, she should still have refill remaining and the request was early. Patient does need refill on her Metformin which she states she only takes half a tablet with her meals, says Daisy Webster is aware of this. Suma Cantu LPN

## 2020-12-31 NOTE — TELEPHONE ENCOUNTER
I called pt's home and cell number, no msg left. I sent a Active DSPt msg but I don't believe she uses that. Please try calling again later.  ................Riky Mathur LPN,   December 31, 2020,      3:15 PM,   [facility]

## 2020-12-31 NOTE — TELEPHONE ENCOUNTER
Routing to team to set up F2F appointment for patient for yearly and diabetes.    Routing refill request to provider for review/approval because:  Patient needs to be seen because:  > 6 months since last visit for diabetes.  Lab Results   Component Value Date    A1C 6.5 09/29/2019    A1C 7.2 04/22/2019    A1C 6.8 06/04/2018    A1C 6.8 10/21/2016    A1C 7.1 05/13/2016     Last Written Prescription Date:  5/5/2020  Last Fill Quantity: 180,  # refills: 0   Last office visit: Visit date not found with prescribing provider:  2/19/2020   Future Office Visit:      BING ArayaN, RN

## 2021-01-04 DIAGNOSIS — E11.42 TYPE 2 DIABETES MELLITUS WITH DIABETIC POLYNEUROPATHY, WITHOUT LONG-TERM CURRENT USE OF INSULIN (H): Primary | ICD-10-CM

## 2021-01-05 RX ORDER — GLIMEPIRIDE 2 MG/1
2 TABLET ORAL
Qty: 90 TABLET | Refills: 3 | Status: SHIPPED | OUTPATIENT
Start: 2021-01-05 | End: 2022-01-12

## 2021-01-05 NOTE — TELEPHONE ENCOUNTER
Patient is out of her refills the mail order pharmacy has sent out her years supply is what they are telling her,   She was mailed out meds on  and that was the last one she is able to get.    She is out and will need this refill.    tamia is telling her that her refills are  now and needs new.

## 2021-01-05 NOTE — TELEPHONE ENCOUNTER
Diabetes Test strips Prescription approved per [facility] Refill Protocol.............GARY Kim

## 2021-01-07 ENCOUNTER — ANCILLARY PROCEDURE (OUTPATIENT)
Dept: GENERAL RADIOLOGY | Facility: CLINIC | Age: 81
End: 2021-01-07
Attending: ORTHOPAEDIC SURGERY
Payer: COMMERCIAL

## 2021-01-07 ENCOUNTER — OFFICE VISIT (OUTPATIENT)
Dept: ORTHOPEDICS | Facility: CLINIC | Age: 81
End: 2021-01-07
Payer: COMMERCIAL

## 2021-01-07 VITALS
HEIGHT: 64 IN | DIASTOLIC BLOOD PRESSURE: 72 MMHG | SYSTOLIC BLOOD PRESSURE: 122 MMHG | WEIGHT: 168.5 LBS | BODY MASS INDEX: 28.77 KG/M2

## 2021-01-07 DIAGNOSIS — M17.31 POST-TRAUMATIC OSTEOARTHRITIS OF RIGHT KNEE: ICD-10-CM

## 2021-01-07 DIAGNOSIS — M25.551 RIGHT HIP PAIN: Primary | ICD-10-CM

## 2021-01-07 DIAGNOSIS — M25.551 RIGHT HIP PAIN: ICD-10-CM

## 2021-01-07 DIAGNOSIS — M25.561 RIGHT KNEE PAIN: ICD-10-CM

## 2021-01-07 PROCEDURE — 73502 X-RAY EXAM HIP UNI 2-3 VIEWS: CPT | Mod: TC | Performed by: RADIOLOGY

## 2021-01-07 PROCEDURE — 20610 DRAIN/INJ JOINT/BURSA W/O US: CPT | Mod: RT | Performed by: ORTHOPAEDIC SURGERY

## 2021-01-07 PROCEDURE — 73564 X-RAY EXAM KNEE 4 OR MORE: CPT | Mod: TC | Performed by: RADIOLOGY

## 2021-01-07 PROCEDURE — 99203 OFFICE O/P NEW LOW 30 MIN: CPT | Mod: 25 | Performed by: ORTHOPAEDIC SURGERY

## 2021-01-07 RX ORDER — TRIAMCINOLONE ACETONIDE 40 MG/ML
40 INJECTION, SUSPENSION INTRA-ARTICULAR; INTRAMUSCULAR ONCE
Status: COMPLETED | OUTPATIENT
Start: 2021-01-07 | End: 2021-01-07

## 2021-01-07 RX ADMIN — TRIAMCINOLONE ACETONIDE 40 MG: 40 INJECTION, SUSPENSION INTRA-ARTICULAR; INTRAMUSCULAR at 14:08

## 2021-01-07 ASSESSMENT — MIFFLIN-ST. JEOR: SCORE: 1216.37

## 2021-01-07 ASSESSMENT — PAIN SCALES - GENERAL: PAINLEVEL: SEVERE PAIN (7)
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
## 2021-01-07 NOTE — PROGRESS NOTES
Clinic Administered Medication Documentation      Injection Documentation     Injection was administered by provider (please see MAR for given by information). Please see MAR and medication order for additional information.     Site: Joint injection   Medication Used: Kenalog 40mg   Expiration Date:  9/2022      The following medication was given by ERIC Jarrett, CNP, DNP:     MEDICATION: Kenalog 40mg/1ml  ROUTE: Joint Injection  SITE: right knee  DOSE: 1 mL  LOT #: [id number]  : [employer]  EXPIRATION DATE:  9/2022  NDC: [address]-6

## 2021-01-07 NOTE — PATIENT INSTRUCTIONS
You have advanced arthritis in your right knee.  We did a steroid injection into that knee today as well as ordered physical therapy.  The steroid injection can take up to 2 weeks to work and your knee may be sore for the next day or so until it starts working. If you are noticing redness from the injection site, extreme pain, rash, difficulty breathing, or any other concerning symptom get seen immediately. Although rare, you can have a reaction from the injection.  physical therapy will be calling you to set up appointments. We would like to see you back in about 4-6 weeks if the knee is not getting better, sooner if you have any new or concerning symptoms.       We are able to focus on one area at a time.  We did have get some xrays of your hip before you left, you can then schedule a follow-up for the hip at your convenience.  You will need to make a separate visit to exam your wrist.    Patient Education     Osteoarthritis  Osteoarthritis happens when the cartilage in a joint becomes damaged and worn. This may be from age, wear and tear, overuse of the joint, obesity, or other problems. Osteoarthritis can affect any joint. But it's most common in hands, knees, spine, hips, and feet. Symptoms include joint stiffness, and pain. It's also called degenerative joint disease.   Home care    When a joint is more sore than usual, rest it for a day or two.    Heat can help relieve stiffness. Take a hot bath or apply a heating pad for up to 30 minutes at a time. If symptoms are worse in the morning, using heat just after awakening can help relax the muscle and soothe the joints.     Ice helps relieve pain. It's often used after activity. Use a cold pack wrapped in a thin cloth on the joint for 10 to 15 minutes at a time.     Alternating hot and cold can also help relieve pain. Try this for 20 minutes at a time, several times per day.    Exercise helps prevent the muscles and ligaments around the joint from becoming  weak. It also helps maintain function in the joint. Be as active as you can. Talk to your healthcare provider about what activity program is best for you.    Excess weight puts a lot of extra strain on weight-bearing joints of the lower back, hips, knees, feet and ankles. If you are overweight, talk to your healthcare provider about a safe and effective weight loss program.    Use anti-inflammatory medicines as prescribed for pain. This includes acetaminophen or NSAIDs such as ibuprofen or naproxen. Don't take NSAIDs if your healthcare provider has told you that you can't take NSAIDS because of other health problems If needed, topical or injected medicines may be recommended. Talk with your healthcare provider if these options are not enough to manage your pain. Follow the directions on all over-the-counter medicines.    Talk with your healthcare provider about devices that might help improve your function and reduce pain.    Talk with you healthcare provider about physical therapy to help strengthen your joints and the surrounding muscles.    Follow-up care  Follow up with your healthcare provider, or as advised.   When to seek medical advice  Call your healthcare provider right away if any of these occur:    Redness or swelling of a painful joint    Discharge or pus from a painful joint    Fever of 100.4 F (38 C) or higher, or as directed by your healthcare provider    Worsening joint pain    Decreased ability to move the joint or bear weight on the joint  [employer] last reviewed this educational content on 8/1/2019 2000-2020 [employer]. [address] [place] [address]. All rights reserved. This information is not intended as a substitute for professional medical care. Always follow your healthcare professional's instructions.           Patient Education     Cortisone Injections    Cortisone is a type of steroid. It can greatly reduce swelling, redness, inflammation, and pain. Being  injected with cortisone is simple and doesn t take long. Your doctor may ask you questions about your health. Cortisone can affect certain health conditions, such as diabetes.  Why have a cortisone injection?  Injecting cortisone can sometimes relieve pain for anything from a sports injury to arthritis. Your doctor may suggest an injection if rest, splints, physical therapy, rehabilitation exercises, or oral medicine doesn t relieve your pain. Injecting cortisone is simpler than having surgery. And cortisone may provide the lasting pain relief that can help you get out and enjoy life again. A recent study showed steroid injections may actually worsen osteoarthritis of the knee. Be sure to discuss all options with your healthcare provider. Injections are usually used no more than 3 to 4 times a year in one area of the body.  Getting the injection  Your doctor will start by cleaning and possibly numbing your skin at the injection site. Next you ll be injected with local anesthetics (for short-term pain relief) and cortisone. The injection may last a few moments. A small bandage will be put over the injection site. You ll then be ready to go home.  After your injection  After being injected, make sure you don t injure the treated region. But stay active. Enjoy a walk or some other mild activity. Just be careful not to strain the region that gave you trouble.  The next day  Some people feel more pain after being injected. This is normal, and it will go away soon. Applying ice for 20 minutes at a time to your injury may reduce the increased pain. Rest for the first day or two. You don t need to stay in bed. But avoid tasks that may strain the injured region.  Cortisone injections can increase your blood sugar for several days after the injection. If you have diabetes, follow your blood sugar closely during this time. Follow your regular plan for what to do when your blood sugar is elevated.    Jacob last reviewed this  educational content on 9/1/2017 2000-2020 [employer]. [address] [place] [address], [place] [address]. All rights reserved. This information is not intended as a substitute for professional medical care. Always follow your healthcare professional's instructions.
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
## 2021-01-09 ENCOUNTER — HEALTH MAINTENANCE LETTER (OUTPATIENT)
Age: 81
End: 2021-01-09

## 2021-01-12 ENCOUNTER — TELEPHONE (OUTPATIENT)
Dept: INTERNAL MEDICINE | Facility: CLINIC | Age: 81
End: 2021-01-12

## 2021-01-12 DIAGNOSIS — E11.42 TYPE 2 DIABETES MELLITUS WITH DIABETIC POLYNEUROPATHY, WITHOUT LONG-TERM CURRENT USE OF INSULIN (H): ICD-10-CM

## 2021-01-12 DIAGNOSIS — G62.9 PERIPHERAL POLYNEUROPATHY: ICD-10-CM

## 2021-01-12 RX ORDER — METFORMIN HYDROCHLORIDE 750 MG/1
TABLET, EXTENDED RELEASE ORAL
Qty: 45 TABLET | Refills: 3 | Status: SHIPPED | OUTPATIENT
Start: 2021-01-12 | End: 2021-01-14

## 2021-01-12 RX ORDER — GABAPENTIN 300 MG/1
CAPSULE ORAL
Qty: 270 CAPSULE | Refills: 3 | Status: SHIPPED | OUTPATIENT
Start: 2021-01-12 | End: 2021-04-15

## 2021-01-12 NOTE — TELEPHONE ENCOUNTER
Patient wants a call back to find out if she should be fasting for her Thursday appointment.  Also she wants to know if you go the form from [facility] for her diabetic shoes.  Please call her before Thursday.

## 2021-01-13 NOTE — TELEPHONE ENCOUNTER
Left msg informing of info from Dr Villa on identified voicemail...............Riky Mathur LPN,   January 13, 2021,      12:08 PM,   [facility]

## 2021-01-14 ENCOUNTER — ANCILLARY PROCEDURE (OUTPATIENT)
Dept: GENERAL RADIOLOGY | Facility: CLINIC | Age: 81
End: 2021-01-14
Attending: ORTHOPAEDIC SURGERY
Payer: COMMERCIAL

## 2021-01-14 ENCOUNTER — OFFICE VISIT (OUTPATIENT)
Dept: ORTHOPEDICS | Facility: CLINIC | Age: 81
End: 2021-01-14
Payer: COMMERCIAL

## 2021-01-14 ENCOUNTER — OFFICE VISIT (OUTPATIENT)
Dept: INTERNAL MEDICINE | Facility: CLINIC | Age: 81
End: 2021-01-14
Payer: COMMERCIAL

## 2021-01-14 VITALS
WEIGHT: 164 LBS | BODY MASS INDEX: 28 KG/M2 | DIASTOLIC BLOOD PRESSURE: 60 MMHG | SYSTOLIC BLOOD PRESSURE: 110 MMHG | HEIGHT: 64 IN

## 2021-01-14 VITALS
RESPIRATION RATE: 16 BRPM | OXYGEN SATURATION: 97 % | BODY MASS INDEX: 28.05 KG/M2 | SYSTOLIC BLOOD PRESSURE: 118 MMHG | DIASTOLIC BLOOD PRESSURE: 68 MMHG | WEIGHT: 164.3 LBS | TEMPERATURE: 97.4 F | HEART RATE: 84 BPM | HEIGHT: 64 IN

## 2021-01-14 DIAGNOSIS — M25.551 RIGHT HIP PAIN: Primary | ICD-10-CM

## 2021-01-14 DIAGNOSIS — Z13.220 SCREENING FOR HYPERLIPIDEMIA: Primary | ICD-10-CM

## 2021-01-14 DIAGNOSIS — M18.11 PRIMARY OSTEOARTHRITIS OF FIRST CARPOMETACARPAL JOINT OF RIGHT HAND: ICD-10-CM

## 2021-01-14 DIAGNOSIS — E78.5 HYPERLIPIDEMIA LDL GOAL <100: ICD-10-CM

## 2021-01-14 DIAGNOSIS — E55.9 VITAMIN D DEFICIENCY: ICD-10-CM

## 2021-01-14 DIAGNOSIS — E53.8 VITAMIN B12 DEFICIENCY (NON ANEMIC): ICD-10-CM

## 2021-01-14 DIAGNOSIS — R30.0 DYSURIA: ICD-10-CM

## 2021-01-14 DIAGNOSIS — I47.10 SVT (SUPRAVENTRICULAR TACHYCARDIA) (H): ICD-10-CM

## 2021-01-14 DIAGNOSIS — E11.42 TYPE 2 DIABETES MELLITUS WITH DIABETIC POLYNEUROPATHY, WITHOUT LONG-TERM CURRENT USE OF INSULIN (H): ICD-10-CM

## 2021-01-14 DIAGNOSIS — M79.641 RIGHT HAND PAIN: ICD-10-CM

## 2021-01-14 LAB
ALBUMIN UR-MCNC: NEGATIVE MG/DL
ANION GAP SERPL CALCULATED.3IONS-SCNC: 3 MMOL/L (ref 3–14)
APPEARANCE UR: ABNORMAL
BACTERIA #/AREA URNS HPF: ABNORMAL /HPF
BILIRUB UR QL STRIP: NEGATIVE
BUN SERPL-MCNC: 19 MG/DL (ref 7–30)
CALCIUM SERPL-MCNC: 9 MG/DL (ref 8.5–10.1)
CHLORIDE SERPL-SCNC: 105 MMOL/L (ref 94–109)
CHOLEST SERPL-MCNC: 129 MG/DL
CO2 SERPL-SCNC: 30 MMOL/L (ref 20–32)
COLOR UR AUTO: ABNORMAL
CREAT SERPL-MCNC: 0.93 MG/DL (ref 0.52–1.04)
CREAT UR-MCNC: 236 MG/DL
DEPRECATED CALCIDIOL+CALCIFEROL SERPL-MC: 42 UG/L (ref 20–75)
GFR SERPL CREATININE-BSD FRML MDRD: 58 ML/MIN/{1.73_M2}
GLUCOSE SERPL-MCNC: 123 MG/DL (ref 70–99)
GLUCOSE UR STRIP-MCNC: NEGATIVE MG/DL
HBA1C MFR BLD: 6.7 % (ref 0–5.6)
HDLC SERPL-MCNC: 72 MG/DL
HGB UR QL STRIP: NEGATIVE
HYALINE CASTS #/AREA URNS LPF: 8 /LPF (ref 0–2)
KETONES UR STRIP-MCNC: NEGATIVE MG/DL
LDLC SERPL CALC-MCNC: 41 MG/DL
LEUKOCYTE ESTERASE UR QL STRIP: ABNORMAL
MICROALBUMIN UR-MCNC: 60 MG/L
MICROALBUMIN/CREAT UR: 25.25 MG/G CR (ref 0–25)
MUCOUS THREADS #/AREA URNS LPF: PRESENT /LPF
NITRATE UR QL: NEGATIVE
NONHDLC SERPL-MCNC: 57 MG/DL
PH UR STRIP: 6 PH (ref 5–7)
POTASSIUM SERPL-SCNC: 4 MMOL/L (ref 3.4–5.3)
RBC #/AREA URNS AUTO: 2 /HPF (ref 0–2)
SODIUM SERPL-SCNC: 138 MMOL/L (ref 133–144)
SOURCE: ABNORMAL
SP GR UR STRIP: 1.02 (ref 1–1.03)
SQUAMOUS #/AREA URNS AUTO: 7 /HPF (ref 0–1)
TRIGL SERPL-MCNC: 78 MG/DL
UROBILINOGEN UR STRIP-MCNC: 0 MG/DL (ref 0–2)
VIT B12 SERPL-MCNC: 4207 PG/ML (ref 193–986)
WBC #/AREA URNS AUTO: 36 /HPF (ref 0–5)

## 2021-01-14 PROCEDURE — 82306 VITAMIN D 25 HYDROXY: CPT | Performed by: INTERNAL MEDICINE

## 2021-01-14 PROCEDURE — 36415 COLL VENOUS BLD VENIPUNCTURE: CPT | Performed by: INTERNAL MEDICINE

## 2021-01-14 PROCEDURE — 82043 UR ALBUMIN QUANTITATIVE: CPT | Performed by: INTERNAL MEDICINE

## 2021-01-14 PROCEDURE — 80061 LIPID PANEL: CPT | Performed by: INTERNAL MEDICINE

## 2021-01-14 PROCEDURE — 73130 X-RAY EXAM OF HAND: CPT | Mod: TC | Performed by: RADIOLOGY

## 2021-01-14 PROCEDURE — 99214 OFFICE O/P EST MOD 30 MIN: CPT | Performed by: INTERNAL MEDICINE

## 2021-01-14 PROCEDURE — 20605 DRAIN/INJ JOINT/BURSA W/O US: CPT | Mod: RT | Performed by: ORTHOPAEDIC SURGERY

## 2021-01-14 PROCEDURE — 82607 VITAMIN B-12: CPT | Performed by: INTERNAL MEDICINE

## 2021-01-14 PROCEDURE — 87186 SC STD MICRODIL/AGAR DIL: CPT | Performed by: FAMILY MEDICINE

## 2021-01-14 PROCEDURE — 83036 HEMOGLOBIN GLYCOSYLATED A1C: CPT | Performed by: INTERNAL MEDICINE

## 2021-01-14 PROCEDURE — 87088 URINE BACTERIA CULTURE: CPT | Performed by: FAMILY MEDICINE

## 2021-01-14 PROCEDURE — 81001 URINALYSIS AUTO W/SCOPE: CPT | Performed by: INTERNAL MEDICINE

## 2021-01-14 PROCEDURE — 99214 OFFICE O/P EST MOD 30 MIN: CPT | Mod: 25 | Performed by: ORTHOPAEDIC SURGERY

## 2021-01-14 PROCEDURE — 80048 BASIC METABOLIC PNL TOTAL CA: CPT | Performed by: INTERNAL MEDICINE

## 2021-01-14 PROCEDURE — 87086 URINE CULTURE/COLONY COUNT: CPT | Performed by: FAMILY MEDICINE

## 2021-01-14 RX ORDER — BETAMETHASONE SODIUM PHOSPHATE AND BETAMETHASONE ACETATE 3; 3 MG/ML; MG/ML
6 INJECTION, SUSPENSION INTRA-ARTICULAR; INTRALESIONAL; INTRAMUSCULAR; SOFT TISSUE ONCE
Status: COMPLETED | OUTPATIENT
Start: 2021-01-14 | End: 2021-01-14

## 2021-01-14 RX ORDER — METFORMIN HYDROCHLORIDE 750 MG/1
TABLET, EXTENDED RELEASE ORAL
Qty: 90 TABLET | Refills: 3 | Status: SHIPPED | OUTPATIENT
Start: 2021-01-14 | End: 2021-01-19

## 2021-01-14 RX ADMIN — BETAMETHASONE SODIUM PHOSPHATE AND BETAMETHASONE ACETATE 6 MG: 3; 3 INJECTION, SUSPENSION INTRA-ARTICULAR; INTRALESIONAL; INTRAMUSCULAR; SOFT TISSUE at 11:50

## 2021-01-14 ASSESSMENT — PAIN SCALES - GENERAL
PAINLEVEL: NO PAIN (0)
PAINLEVEL: NO PAIN (0)

## 2021-01-14 ASSESSMENT — MIFFLIN-ST. JEOR
SCORE: 1197.32
SCORE: 1195.96

## 2021-01-14 NOTE — PROGRESS NOTES
Charleen Porter is a 80 year old who presents to clinic today for the following health issues     HPI     Chief Complaint   Patient presents with     Diabetes     Dme     needs face to face to discuss need for diabetic shoes        Diabetes Follow-up    How often are you checking your blood sugar? One time daily  What time of day are you checking your blood sugars (select all that apply)?  Before meals  Have you had any blood sugars above 200?  No  Have you had any blood sugars below 70?  No    What symptoms do you notice when your blood sugar is low?  Shaky and Weak    What concerns do you have today about your diabetes? None     Do you have any of these symptoms? (Select all that apply)  Numbness in feet      BP Readings from Last 2 Encounters:   01/14/21 118/68   01/07/21 122/72     Hemoglobin A1C (%)   Date Value   09/29/2019 6.5 (H)   04/22/2019 7.2 (H)     LDL Cholesterol Calculated (mg/dL)   Date Value   09/30/2019 113 (H)   04/22/2019 144 (H)            EMR reviewed including:             Complaint, History of Chief Complaint, Corresponding Review of Systems, and Complaint Specific Physical Examination.    #1   Follow-up of diabetes.  Blood sugars generally around 130.  Patient recently stopped checking her blood sugars as she ran out of strips.  Patient recently stopped Metformin as she ran out of pills.  Patient notes that she has ongoing and progressive neuropathy in the feet and has been going to a neuropathy clinic.  Patient needs to have diabetic shoes as her shoes are causing callus formation and sores on her feet.  Exam: Decreased sensation of the lower extremities to the ankles noted.  No acute infection noted.  Callus formation is present.  No open ulcers present.  Distal pulses are present but somewhat weakened.    #2   Hyperlipidemia.  Patient has many questions regarding discontinuing her statin medication.  Discussed benefits of medication with respect vascular disease.  Patient will  "continue her statin medication.      #3   Has questions regarding supraventricular tachycardia.  Has been reviewing her \"my chart\" in great detail.  Has questions regarding previous diagnoses.  Holter monitor performed in September 2018 demonstrated brief PSVT and this is explained in detail.  Work-up was in association with a TIA. /68 (BP Location: Right arm, Patient Position: Sitting, Cuff Size: Adult Regular)   Pulse 84   Temp 97.4  F (36.3  C) (Temporal)   Resp 16   Ht 1.613 m (5' 3.5\")   Wt 74.5 kg (164 lb 4.8 oz)   SpO2 97%   BMI 28.65 kg/m               Decision Making    Problem and Complexity     1. Screening for hyperlipidemia  Check lipid status, continue current medications including statin.  - Lipid panel reflex to direct LDL Fasting    2. Type 2 diabetes mellitus with diabetic polyneuropathy, without long-term current use of insulin (H)  Check A1c and renal function.  Continue Metformin at current dosage.  Patient does have progressive diabetic neuropathy, recommend orthotic evaluation.  - HEMOGLOBIN A1C  - BASIC METABOLIC PANEL  - Albumin Random Urine Quantitative with Creat Ratio  - metFORMIN (GLUCOPHAGE-XR) 750 MG 24 hr tablet; 1/2 tablet twice daily.  Dispense: 90 tablet; Refill: 3  - ORTHOTICS REFERRAL    3. Vitamin D deficiency  History of neuropathy, will check vitamin D levels.  Patient will share this information with her \"neuropathy clinic\".  - Vitamin D Deficiency    4. Vitamin B12 deficiency (non anemic)  See #3 above  - Vitamin B12    5. Hyperlipidemia LDL goal <100  Check lipid levels and adjust medications accordingly.    6. SVT (supraventricular tachycardia) (H)  None recurrent and asymptomatic.  No specific therapy required at this time.          ------------------------------------------------------------------------------------------------------------------------------  Data  1  Specialty (external) notes reviewed:   Previous Holter monitor has been reviewed.    Tests " reviewed:   None     Tests ordered:   As noted above    Independent Historians Interview:   None    2  Review of outside Tests:   None    3   Verbal Discussion with Specialists:    None      ----------------------------------------------------------------------------------------------------------------------------------  Risk   Prescription drug management:   Yes                                High risk for toxicity:     Decision regarding surgery:    None     Social determinants of health:   No     Decision to withhold therapy:   None                               FOLLOW UP   I have asked the patient to make an appointment for followup with me in 3 months                I have carefully explained the diagnosis and treatment options to the patient.  The patient has displayed an understanding of the above, and all subsequent questions were answered.      DO JT Thompson    Portions of this note were produced using Vertigo  Although every attempt at real-time proof reading has been made, occasional grammar/syntax errors may have been missed.

## 2021-01-14 NOTE — LETTER
1/14/2021         RE: Charlotte Snow  [address] [facility] [address]        Dear Colleague,    Thank you for referring your patient, Charlotte Snow, to the [facility]. Please see a copy of my visit note below.    Office Visit-Follow up    Chief Complaint: Charlotte Snow is a 80 year old female who is being seen for   Chief Complaint   Patient presents with     Musculoskeletal Problem     right hip pain     Musculoskeletal Problem     right hand pain       History of Present Illness:   Today's visit:  Initially presented for right hip pain.  However she reports after her last visit approximately 1 week ago where she received a right knee intra-articular steroid injection the hip has improved.  She has no pain today.  She also reports her knee is doing quite well.  She had no reaction to the injection.    She has physical therapy scheduled for tomorrow.    She also complains of right thumb pain.  Is been present since 2009.  No specific injuries or traumas.  It sounds like from her history she is worn a thumb spica brace which was helpful.  She has had no injections or surgeries to the thumb    January 7, 2021 visit:  Charlotte Snow is a 80 year old female who is seen in consultation at the request of self for evaluation of right knee pain.  States had nail placed from a hip fracture in 2012, and an open reduction internal fixation to the right knee from a different injury in 2010.  States initially the knee from 2010 and hip injury from 2012 did very well. Then slowly over the last few years has started to get progressively worse again. No new injuries. The knee pain is described as a diffuse medial sided knee pain. Worst at night and keeping her awake, achy, throbbing pain. Non-radiating. Through notes had neuropathy so unsure if having any radiating symptoms to the foot. She also described as distal knee anterior pain and feels the hardware is painful.    Treatments tried:  "3-4grams/day of tylenol, physical therapy initially after the injuries, one previous steroid injection 9-10 years ago (that caused pain but didn't help), gabapentin, rest, activity modification.   She also notes having hip pain, unsure if due to limp from knee or separate, but it has been about as painful as long as the knee.      Takes plavix so unable to take NSAIDs.  History of diabetes with last hemoglobin A1c of 6.5 September 2019       Social History     Occupational History     Occupation: Homemaker/   Tobacco Use     Smoking status: Never Smoker     Smokeless tobacco: Never Used   Substance and Sexual Activity     Alcohol use: No     Alcohol/week: 0.0 standard drinks     Drug use: No     Sexual activity: Yes     Partners: Male     Birth control/protection: Surgical       REVIEW OF SYSTEMS  General: negative for, night sweats, dizziness, fatigue  Resp: No shortness of breath and no cough  CV: negative for chest pain, syncope or near-syncope  GI: negative for nausea, vomiting and diarrhea  : negative for dysuria and hematuria  Musculoskeletal: as above  Neurologic: negative for syncope   Hematologic: negative for bleeding disorder    Physical Exam:  Vitals: /60   Ht 1.613 m (5' 3.5\")   Wt 74.4 kg (164 lb)   BMI 28.60 kg/m    BMI= Body mass index is 28.6 kg/m .  Constitutional: healthy, alert and no acute distress   Psychiatric: mentation appears normal and affect normal/bright  NEURO: no focal deficits  RESP: Normal with easy respirations and no use of accessory muscles to breathe, no audible wheezing or retractions  CV: RLE: no edema           SKIN: No erythema, rashes, excoriation, or breakdown. No evidence of infection.   JOINT/EXTREMITIES:right hip has near active full range of motion with no focal areas of pain.  No pain with resisted hip flexion.    Right hand is tenderness over the first CMC joint and noted with a prominence of the joint.  No skin changes.  Notes infection.  GAIT: " not tested             Diagnostic Modalities:    Right hand x-ray: No acute fractures or dislocations.  Extensive degenerative changes particularly at the first CMC joint with some hypertrophic osteophyte formation.  There is also noted degenerative changes at the PIP joint and DIP joint of multiple digits.      right hip X-ray: Long gamma nail in place.  Hip joint appears to be fairly well-preserved with no significant degenerative changes.  There appears to be no prominence of the leg bolts in the femoral head.  There is some calcification along the medial aspect of the femur near the lesser trochanter as well as slightly distal to that consistent with postsurgical and healing from the fracture  Independent visualization of the images was performed.      Impression: right hip pain-resolved-most likely compensatory related to her knee arthritis causing altered gait mechanics    Right thumb first CMC osteoarthritis    Plan:  All of the above pertinent physical exam and imaging modalities findings was reviewed with Charlotte.                                          INJECTION PROCEDURE:  The patient was counseled about an  injection, including discussion of risks (including infection), contents of the injection, rationale for performing the injection, and expected benefits of the injection. The skin was prepped with alcohol and betadine and then utilizing sterile technique an injection of the right thumb CMC joint from a dorsal midline approach was performed. The injection consisted 1ml of Betamethasone (30mg per 5 ml) mixed with 1 ml of 0.5% Marcaine. The patient tolerated the injection well, and there were no complications. The injection site was covered with a Band-Aid. The injection was performed by Devon Gutierrez D.O.    I discussed her findings.  Her hip pain has resolved.  Most likely related to her abnormal gait mechanics from her knee.  She has physical therapy scheduled tomorrow and I believe that will be helpful  overall for her knee and hips.  For her thumb is consistent with osteoarthritis.  I recommended hand therapy as well as an injection and placed a referral.    Return to clinic 4-6 , week(s), PRN, or sooner as needed for changes.  Re-x-ray on return: No    Devon Gutierrez D.O.          Clinic Administered Medication Documentation      Injection Documentation     Injection was administered by provider (please see MAR for given by information). Please see MAR and medication order for additional information.     Site: Joint injection   Medication Used: Betamethasone 6mg   Expiration Date:  3/2021  The following medication was given by Sixto Gutierrez D.O.:     MEDICATION: Betamethasone (6 mg/mL)  ROUTE: Joint Injection  SITE: right cmc  DOSE: 1.0ml  LOT #: [id number]  : [employer]  EXPIRATION DATE:  3/2021  NDC: 6924-8766-02                          Again, thank you for allowing me to participate in the care of your patient.        Sincerely,        Sixto Gutierrez, DO

## 2021-01-14 NOTE — PROGRESS NOTES
Office Visit-Follow up    Chief Complaint: Charlotte Snow is a 80 year old female who is being seen for   Chief Complaint   Patient presents with     Musculoskeletal Problem     right hip pain     Musculoskeletal Problem     right hand pain       History of Present Illness:   Today's visit:  Initially presented for right hip pain.  However she reports after her last visit approximately 1 week ago where she received a right knee intra-articular steroid injection the hip has improved.  She has no pain today.  She also reports her knee is doing quite well.  She had no reaction to the injection.    She has physical therapy scheduled for tomorrow.    She also complains of right thumb pain.  Is been present since 2009.  No specific injuries or traumas.  It sounds like from her history she is worn a thumb spica brace which was helpful.  She has had no injections or surgeries to the thumb    January 7, 2021 visit:  Charlotte Snow is a 80 year old female who is seen in consultation at the request of self for evaluation of right knee pain.  States had nail placed from a hip fracture in 2012, and an open reduction internal fixation to the right knee from a different injury in 2010.  States initially the knee from 2010 and hip injury from 2012 did very well. Then slowly over the last few years has started to get progressively worse again. No new injuries. The knee pain is described as a diffuse medial sided knee pain. Worst at night and keeping her awake, achy, throbbing pain. Non-radiating. Through notes had neuropathy so unsure if having any radiating symptoms to the foot. She also described as distal knee anterior pain and feels the hardware is painful.    Treatments tried: 3-4grams/day of tylenol, physical therapy initially after the injuries, one previous steroid injection 9-10 years ago (that caused pain but didn't help), gabapentin, rest, activity modification.   She also notes having hip pain, unsure if due to limp  "from knee or separate, but it has been about as painful as long as the knee.      Takes plavix so unable to take NSAIDs.  History of diabetes with last hemoglobin A1c of 6.5 September 2019       Social History     Occupational History     Occupation: Homemaker/   Tobacco Use     Smoking status: Never Smoker     Smokeless tobacco: Never Used   Substance and Sexual Activity     Alcohol use: No     Alcohol/week: 0.0 standard drinks     Drug use: No     Sexual activity: Yes     Partners: Male     Birth control/protection: Surgical       REVIEW OF SYSTEMS  General: negative for, night sweats, dizziness, fatigue  Resp: No shortness of breath and no cough  CV: negative for chest pain, syncope or near-syncope  GI: negative for nausea, vomiting and diarrhea  : negative for dysuria and hematuria  Musculoskeletal: as above  Neurologic: negative for syncope   Hematologic: negative for bleeding disorder    Physical Exam:  Vitals: /60   Ht 1.613 m (5' 3.5\")   Wt 74.4 kg (164 lb)   BMI 28.60 kg/m    BMI= Body mass index is 28.6 kg/m .  Constitutional: healthy, alert and no acute distress   Psychiatric: mentation appears normal and affect normal/bright  NEURO: no focal deficits  RESP: Normal with easy respirations and no use of accessory muscles to breathe, no audible wheezing or retractions  CV: RLE: no edema           SKIN: No erythema, rashes, excoriation, or breakdown. No evidence of infection.   JOINT/EXTREMITIES:right hip has near active full range of motion with no focal areas of pain.  No pain with resisted hip flexion.    Right hand is tenderness over the first CMC joint and noted with a prominence of the joint.  No skin changes.  Notes infection.  GAIT: not tested             Diagnostic Modalities:    Right hand x-ray: No acute fractures or dislocations.  Extensive degenerative changes particularly at the first CMC joint with some hypertrophic osteophyte formation.  There is also noted degenerative " changes at the PIP joint and DIP joint of multiple digits.      right hip X-ray: Long gamma nail in place.  Hip joint appears to be fairly well-preserved with no significant degenerative changes.  There appears to be no prominence of the leg bolts in the femoral head.  There is some calcification along the medial aspect of the femur near the lesser trochanter as well as slightly distal to that consistent with postsurgical and healing from the fracture  Independent visualization of the images was performed.      Impression: right hip pain-resolved-most likely compensatory related to her knee arthritis causing altered gait mechanics    Right thumb first CMC osteoarthritis    Plan:  All of the above pertinent physical exam and imaging modalities findings was reviewed with Charlotte.                                          INJECTION PROCEDURE:  The patient was counseled about an  injection, including discussion of risks (including infection), contents of the injection, rationale for performing the injection, and expected benefits of the injection. The skin was prepped with alcohol and betadine and then utilizing sterile technique an injection of the right thumb CMC joint from a dorsal midline approach was performed. The injection consisted 1ml of Betamethasone (30mg per 5 ml) mixed with 1 ml of 0.5% Marcaine. The patient tolerated the injection well, and there were no complications. The injection site was covered with a Band-Aid. The injection was performed by Devon Gutierrez D.O.    I discussed her findings.  Her hip pain has resolved.  Most likely related to her abnormal gait mechanics from her knee.  She has physical therapy scheduled tomorrow and I believe that will be helpful overall for her knee and hips.  For her thumb is consistent with osteoarthritis.  I recommended hand therapy as well as an injection and placed a referral.    Return to clinic 4-6 , week(s), PRN, or sooner as needed for changes.  Re-x-ray on return:  Onelia Gutierrez D.O.

## 2021-01-14 NOTE — PROGRESS NOTES
Clinic Administered Medication Documentation      Injection Documentation     Injection was administered by provider (please see MAR for given by information). Please see MAR and medication order for additional information.     Site: Joint injection   Medication Used: Betamethasone 6mg   Expiration Date:  3/2021  The following medication was given by Sixto Gutierrez D.O.:     MEDICATION: Betamethasone (6 mg/mL)  ROUTE: Joint Injection  SITE: right cmc  DOSE: 1.0ml  LOT #: [id number]  : [employer]  EXPIRATION DATE:  3/2021  NDC: 6152-2901-57

## 2021-01-15 ENCOUNTER — HOSPITAL ENCOUNTER (OUTPATIENT)
Dept: PHYSICAL THERAPY | Facility: CLINIC | Age: 81
Setting detail: THERAPIES SERIES
End: 2021-01-15
Attending: NURSE PRACTITIONER
Payer: MEDICARE

## 2021-01-15 DIAGNOSIS — M17.31 POST-TRAUMATIC OSTEOARTHRITIS OF RIGHT KNEE: ICD-10-CM

## 2021-01-15 DIAGNOSIS — M25.551 RIGHT HIP PAIN: ICD-10-CM

## 2021-01-15 PROCEDURE — 97161 PT EVAL LOW COMPLEX 20 MIN: CPT | Mod: GP

## 2021-01-15 PROCEDURE — 97110 THERAPEUTIC EXERCISES: CPT | Mod: GP

## 2021-01-15 NOTE — PROGRESS NOTES
[facility]          OUTPATIENT PHYSICAL THERAPY ORTHOPEDIC EVALUATION  PLAN OF TREATMENT FOR OUTPATIENT REHABILITATION  (COMPLETE FOR INITIAL CLAIMS ONLY)  Patient's Last Name, First Name, M.I.  YOB: 1940  Charlotte Snow    Provider s Name:  [facility]   Medical Record No.  [id number]   Start of Care Date:  01/15/21   Onset Date:  01/07/21   Type:     _X__PT   ___OT   ___SLP Medical Diagnosis:  Right hip pain M25.551  - Primary; Post-traumatic osteoarthritis of right knee M17.31     PT Diagnosis:  decreased strength of RLE, impaired gait mechanics, decreased proprioception, and decreased activity tolerance   Visits from SOC:  1      _________________________________________________________________________________  Plan of Treatment/Functional Goals:  balance training, gait training, joint mobilization, manual therapy, neuromuscular re-education, ROM, strengthening, stretching  as needed  Cryotherapy, Hot packs, Ultrasound  as needed  Goals  Goal Identifier: HEP  Goal Description: Pt will demonstrate accurate compliance to her HEP >4 days per wk to independently progress strength and function  Target Date: 04/14/21    Goal Identifier: LEFS  Goal Description: Pt will improve score on LEFS by >9 to show clinically significant improvement in tolerance for daily activity  Target Date: 04/14/21    Goal Identifier: Walking  Goal Description: Pt will tolerate ambulating for >30 min at a time w/o increased knee pain to progress her ability for daily exercise  Target Date: 04/14/21    Goal Identifier: Strength  Goal Description: Pt will be able to perform 10 squats with good control and no increased knee valgus or pain to show improvement in strength for mobility  Target Date: 04/14/21                                                Therapy Frequency:  other (see comments)(1-2 times per month)  Predicted Duration of Therapy Intervention:  2-3 months    Lizyz  Nish, PT                 I CERTIFY THE NEED FOR THESE SERVICES FURNISHED UNDER        THIS PLAN OF TREATMENT AND WHILE UNDER MY CARE     (Physician co-signature of this document indicates review and certification of the therapy plan).                       Certification Date From:  01/15/21   Certification Date To:  04/14/21    Referring Provider:  Sixto Gutierrez,     Initial Assessment        See Epic Evaluation Start of Care Date: 01/15/21
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
## 2021-01-16 LAB
BACTERIA SPEC CULT: ABNORMAL
Lab: ABNORMAL
SPECIMEN SOURCE: ABNORMAL

## 2021-01-19 ENCOUNTER — TELEPHONE (OUTPATIENT)
Dept: INTERNAL MEDICINE | Facility: CLINIC | Age: 81
End: 2021-01-19

## 2021-01-19 DIAGNOSIS — E11.42 TYPE 2 DIABETES MELLITUS WITH DIABETIC POLYNEUROPATHY, WITHOUT LONG-TERM CURRENT USE OF INSULIN (H): ICD-10-CM

## 2021-01-19 RX ORDER — METFORMIN HCL 500 MG
TABLET, EXTENDED RELEASE 24 HR ORAL
Qty: 90 TABLET | Refills: 0 | Status: SHIPPED | OUTPATIENT
Start: 2021-01-19 | End: 2021-08-25

## 2021-01-19 NOTE — TELEPHONE ENCOUNTER
Dm check metformin er 750 mg tabs 1/2 mg twice daily     They need to have the script written differently as the letter told her that she can not spilt the pill in half.    So need to have a new script for a lower dose sent to the pharmacy.    Patient uses [facility] pharmacy and a 90 day supply needed.     Sherry MOORE       FYHELEN Patient is out of this medication

## 2021-01-22 DIAGNOSIS — E11.42 TYPE 2 DIABETES MELLITUS WITH DIABETIC POLYNEUROPATHY, WITHOUT LONG-TERM CURRENT USE OF INSULIN (H): ICD-10-CM

## 2021-01-22 RX ORDER — METFORMIN HCL 500 MG
TABLET, EXTENDED RELEASE 24 HR ORAL
Qty: 90 TABLET | Refills: 0 | OUTPATIENT
Start: 2021-01-22

## 2021-01-28 DIAGNOSIS — E11.42 TYPE 2 DIABETES MELLITUS WITH DIABETIC POLYNEUROPATHY, WITHOUT LONG-TERM CURRENT USE OF INSULIN (H): ICD-10-CM

## 2021-01-28 RX ORDER — METFORMIN HYDROCHLORIDE 750 MG/1
750 TABLET, EXTENDED RELEASE ORAL
Qty: 90 TABLET | Refills: 0 | Status: SHIPPED | OUTPATIENT
Start: 2021-01-28 | End: 2021-05-20

## 2021-01-28 NOTE — TELEPHONE ENCOUNTER
Rx from [facility] says    Metformin ER 750mg tablet Extended release 24hr with refill request.    Looking at medication that has not been reconciled.See metformin 750mg ER with directions take 1/2 tab daily.     Tonya Sterling MA 1/28/2021  1:56 PM

## 2021-01-29 ENCOUNTER — HOSPITAL ENCOUNTER (OUTPATIENT)
Dept: OCCUPATIONAL THERAPY | Facility: CLINIC | Age: 81
Setting detail: THERAPIES SERIES
End: 2021-01-29
Attending: INTERNAL MEDICINE
Payer: MEDICARE

## 2021-01-29 ENCOUNTER — HOSPITAL ENCOUNTER (OUTPATIENT)
Dept: PHYSICAL THERAPY | Facility: CLINIC | Age: 81
Setting detail: THERAPIES SERIES
End: 2021-01-29
Attending: ORTHOPAEDIC SURGERY
Payer: MEDICARE

## 2021-01-29 PROCEDURE — 97110 THERAPEUTIC EXERCISES: CPT | Mod: GO | Performed by: OCCUPATIONAL THERAPIST

## 2021-01-29 PROCEDURE — 97166 OT EVAL MOD COMPLEX 45 MIN: CPT | Mod: GO | Performed by: OCCUPATIONAL THERAPIST

## 2021-01-29 PROCEDURE — 97110 THERAPEUTIC EXERCISES: CPT | Mod: GP

## 2021-01-29 NOTE — PROGRESS NOTES
[facility]      OUTPATIENT OCCUPATIONAL THERAPY HAND EVALUATION  PLAN OF TREATMENT FOR OUTPATIENT REHABILITATION  (COMPLETE FOR INITIAL CLAIMS ONLY)  Patient's Last Name, First Name, M.I.  YOB: 1940  Charlotte Snow                        Provider s Name: [facility] Medical Record No.  [id number]     Onset Date: 07/29/20(6 months ago)    Start of Care Date: 01/29/21   Type:     ___PT  _X_OT   ___SLP    Medical Diagnosis: Primary OA 1st CMC joint of the right M18.11   Occupational Therapy Diagnosis:  Decrased pain free functional use of the right hand for BADL/IADLs    Visits from SOC: 1      _________________________________________________________________________________  Plan of Treatment/Functional Goals:  Planned Therapy Interventions:  Ultrasound, Paraffin, Strengthening, ROM, Stretching, Manual Therapy, Self Care/Home Management, Home Program, Energy Conservation/Work Simplifacation Training, Adaptive Equipment Training     Goals  1.  Goal Identifier: pain       Goal Description: Charlotte will consistently report 2/10 on a pain 0-10 scale; during  and pinching for craft , dressing and home mgmt.       Target Date: 03/26/21     2. Goal Identifier: strength       Goal Description: Charlotte will increase right hand pinch strength by 3#; in order to complete manipulation of clothing closures, small items needed for crafts.       Target Date: 03/26/21     3. Goal Identifier: Adaptive equipment       Goal Description: Charlotte will report up to 5 AE implemented to reduce right hand pain with daily tasks/activities.       Target Date: 03/26/21     4. Goal Identifier: Joint protection       Goal Description: Charlotte will state up to 5 joint protection techniques implemented for home and leisure activities ie; wristing and croteching.       Target Date: 03/26/21                Treatment  Frequency: Therapy Frequency: 1x week  Predicated Duration of Therapy Intervention:  Predicted Duration of Therapy Intervention (days/wks): 2 months    Yaneth Ball, OT         I CERTIFY THE NEED FOR THESE SERVICES FURNISHED UNDER        THIS PLAN OF TREATMENT AND WHILE UNDER MY CARE     (Physician co-signature of this document indicates review and certification of the therapy plan).                Certification Period:  01/29/21 to 03/26/21            Referring Physician:  Dr Sxito Gutierrez    Initial Assessment        See Epic Evaluation Start of Care Date: 01/29/21          Thank you for referring Charlotte  To OT services to assist with decrease pain and increase functional use of the hands for daily tasks/activities.    If you have any questions or concerns, please contact me at [phone].    Yaneth Ball MA, OTR/L  [facility]

## 2021-01-29 NOTE — PROGRESS NOTES
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  She has decreased pain free functional use of the hands for BADL/IADLs.  The pt has worn a right pre-fabricated thumb brace to reduce pain.  She has available a paraffin bath for home pain mgmt.  The pt did receive a cortiozone injection, which has relieved some of the right thumb pain.  However, she continues to have pain with grasp and pinching activities.  She stated has not written or croteched since pain started.  The pt is a very active 79 y/o with exercies including ; walking and crafts.   Previous treatment or current condition thumb spica pre- fabricated,    How/Where did it occur From Degenerative Joint Disease;With repetition/overuse  (croteching)   Onset date of current episode/exacerbation 07/29/20  (6 months ago)   Chronicity Recurrent   Hand Dominance Right   Affected side Right   Functional limitations perform activities of daily living;perform desired leisure / sports activities   Reported Symptoms Pain;Loss of Motion/Stiffness;Loss of strength   Prior level of function Independent ADL;Assist for IADL   Important Activities walking, croteching, writing and computer   Level of functions comments UEFI: 59/80   Living environment House[facility]   Patient role/Employment history Retired   Patient/Family goals statement \"go " back to croteching:   Fall Risk Screen   Fall screen completed by OT   Have you fallen 2 or more times in the past year? No   Have you fallen and had an injury in the past year? No   Is patient a fall risk? No   Pain   Pain Primary Pain Report   Primary Pain Report   Location right   Radiation Hand   Pain Quality Sharp   Frequency Intermittent   Scale 5/10   Pain Is Same All Of The Time   Pain Is Exacerbated By Lifting;Carrying;Pinching;Gripping   Pain Is Relieved By Splints;Heat  (cortozone injections)   Progression Since Onset Gradually Improving   Strength   Strength Strength   Lateral Pinch - Left 18#   Lateral Pinch - Right 11#   3 Point Pinch - Left 15#   3 Point Pinch - Right 8# with pain   Education Assessment   Preferred Learning Style Listening;Demonstration   Barriers to Learning No barriers   Therapy Interventions   Planned Therapy Interventions Ultrasound;Paraffin;Strengthening;ROM;Stretching;Manual Therapy;Self Care/Home Management;Home Program;Energy Conservation/Work Simplifacation Training;Adaptive Equipment Training   Clinical Impression   Criteria for Skilled Therapeutic Interventions Met yes;treatment indicated   OT Diagnosis Decrased pain free functional use of the right hand for BADL/IADLs   Influenced by the following impairments Pain;Decreased range of motion;Decreased strength   Assessment of Occupational Performance 5 or more Performance Deficits   Identified Performance Deficits dressing, grooming, home mgmt, meal prep/clean up , leisure activities, driving   Clinical Decision Making (Complexity) Moderate complexity   Therapy Frequency 1x week   Predicted Duration of Therapy Intervention (days/wks) 2 months   Risks and Benefits of Treatment have been explained. Yes   Patient, Family & other staff in agreement with plan of care Yes   Hand Eval Goals   Hand Eval Goals 1;2;3;4   Hand Goal 1   Goal Identifier pain   Goal Description Charlotte will consistently report 2/10 on a pain 0-10 scale;  during  and pinching for craft , dressing and home mgmt.   Target Date 03/26/21   Hand Goal 2   Goal Identifier strength   Goal Description Charlotte will increase right hand pinch strength by 3#; in order to complete manipulation of clothing closures, small items needed for crafts.   Target Date 03/26/21   Hand Goal 3   Goal Identifier Adaptive equipment   Goal Description Charlotte will report up to 5 AE implemented to reduce right hand pain with daily tasks/activities.   Target Date 03/26/21   Hand Goal 4   Goal Identifier Joint protection   Goal Description Charlotte will state up to 5 joint protection techniques implemented for home and leisure activities ie; wristing and croteching.   Target Date 03/26/21   Total Evaluation Time   OT Eval, Moderate Complexity Minutes (96678) 30       Thank you for referring Charlotte  To OT services to assist with reduce hand pain and improve function for daily tasks/actvities.    If you have any questions or concerns, please contact me at [phone].    Yaneth Ball MA, OTR/L  [facility]

## 2021-02-04 ENCOUNTER — HOSPITAL ENCOUNTER (OUTPATIENT)
Dept: OCCUPATIONAL THERAPY | Facility: CLINIC | Age: 81
Setting detail: THERAPIES SERIES
End: 2021-02-04
Attending: ORTHOPAEDIC SURGERY
Payer: MEDICARE

## 2021-02-04 DIAGNOSIS — M18.11 PRIMARY OSTEOARTHRITIS OF FIRST CARPOMETACARPAL JOINT OF RIGHT HAND: ICD-10-CM

## 2021-02-04 PROCEDURE — 97140 MANUAL THERAPY 1/> REGIONS: CPT | Mod: GO | Performed by: OCCUPATIONAL THERAPIST

## 2021-02-04 PROCEDURE — 97110 THERAPEUTIC EXERCISES: CPT | Mod: GO | Performed by: OCCUPATIONAL THERAPIST

## 2021-02-04 PROCEDURE — 97035 APP MDLTY 1+ULTRASOUND EA 15: CPT | Mod: GO | Performed by: OCCUPATIONAL THERAPIST

## 2021-02-25 ENCOUNTER — HOSPITAL ENCOUNTER (OUTPATIENT)
Dept: OCCUPATIONAL THERAPY | Facility: CLINIC | Age: 81
Setting detail: THERAPIES SERIES
End: 2021-02-25
Attending: ORTHOPAEDIC SURGERY
Payer: MEDICARE

## 2021-02-25 PROCEDURE — 97035 APP MDLTY 1+ULTRASOUND EA 15: CPT | Mod: GO | Performed by: OCCUPATIONAL THERAPIST

## 2021-02-25 PROCEDURE — 97140 MANUAL THERAPY 1/> REGIONS: CPT | Mod: GO | Performed by: OCCUPATIONAL THERAPIST

## 2021-02-25 PROCEDURE — 97110 THERAPEUTIC EXERCISES: CPT | Mod: GO | Performed by: OCCUPATIONAL THERAPIST

## 2021-03-16 ENCOUNTER — TELEPHONE (OUTPATIENT)
Dept: CARE COORDINATION | Facility: CLINIC | Age: 81
End: 2021-03-16

## 2021-03-16 NOTE — TELEPHONE ENCOUNTER
Pt called and left a message regarding some implants this patient is not in care coordination believes she was sent to the wrong number      Linda TRAVIS, RN, PHN, CCM  Primary Clinic Care Coordination    [facility]  Primary Care Clinics  [email or web address]   Office: [phone]  Employed by [employer]

## 2021-03-16 NOTE — TELEPHONE ENCOUNTER
Called patient to clarify. Patient is having a dental procedure (implants) next Monday 3/22 and she is wondering if she needs to do anything about her blood thinner before this procedure. Please advise.  Bindu Grady CMA

## 2021-04-07 NOTE — PROGRESS NOTES
Outpatient Occupational Therapy Discharge Note     Patient: Charlotte Snow  : 1940    Beginning/End Dates of Reporting Period:  21 to 2021  Pt was seen for 3 OT treatment sessions since SOC    Referring Provider: Dr Gutierrez    Therapy Diagnosis: Right OA/CMC joint effecting pain free functional use of the hand.    Client Self Report: Pt reports completing all HEP. Pt has not contacted rehab to set any further OT appointments.  OT to discharge this date.    Objective Measurements:  Pt not available for final testing             Goals:   Pt not available for final tesing    Plan:  Discharge from therapy.    Discharge:    Reason for Discharge: Patient chooses to discontinue therapy.  Patient has failed to schedule further appointments.      Discharge Plan: Patient to continue home program.      Thank you for referring Charlotte  To OT services to assist with reduce pain and improve function    If you have any questions or concerns, please contact me at [phone].    Yaneth Ball MA, OTR/[facility]  [email or web address]

## 2021-04-15 DIAGNOSIS — G62.9 PERIPHERAL POLYNEUROPATHY: ICD-10-CM

## 2021-04-15 RX ORDER — GABAPENTIN 300 MG/1
CAPSULE ORAL
Qty: 270 CAPSULE | Refills: 3 | Status: SHIPPED | OUTPATIENT
Start: 2021-04-15 | End: 2022-06-17

## 2021-04-15 NOTE — TELEPHONE ENCOUNTER
Gabapentin        Last Written Prescription Date:  1/12/2021  Last Fill Quantity: 270,   # refills: 3  Last Office Visit: 1/14/2021  Future Office visit:       Routing refill request to provider for review/approval because:  Drug not on the G, P or [facility] refill protocol or controlled substance

## 2021-05-12 DIAGNOSIS — G45.9 TIA (TRANSIENT ISCHEMIC ATTACK): ICD-10-CM

## 2021-05-13 RX ORDER — CLOPIDOGREL BISULFATE 75 MG/1
TABLET ORAL
Qty: 90 TABLET | Refills: 3 | OUTPATIENT
Start: 2021-05-13

## 2021-05-13 RX ORDER — ATORVASTATIN CALCIUM 40 MG/1
TABLET, FILM COATED ORAL
Qty: 90 TABLET | Refills: 3 | OUTPATIENT
Start: 2021-05-13

## 2021-05-13 NOTE — TELEPHONE ENCOUNTER
Prescription was sent 8/19/2020 for #90 with 3 refills.  Pharmacy notified via E-Prescribe refusal.     BING MontanaN, RN  [facility]

## 2021-05-19 DIAGNOSIS — E11.42 TYPE 2 DIABETES MELLITUS WITH DIABETIC POLYNEUROPATHY, WITHOUT LONG-TERM CURRENT USE OF INSULIN (H): ICD-10-CM

## 2021-05-20 RX ORDER — METFORMIN HYDROCHLORIDE 750 MG/1
TABLET, EXTENDED RELEASE ORAL
Qty: 90 TABLET | Refills: 0 | Status: SHIPPED | OUTPATIENT
Start: 2021-05-20 | End: 2021-08-03

## 2021-05-20 NOTE — TELEPHONE ENCOUNTER
Prescription approved per [facility] Refill Protocol.    BING MontanaN, RN  [facility]

## 2021-06-16 DIAGNOSIS — G45.9 TIA (TRANSIENT ISCHEMIC ATTACK): ICD-10-CM

## 2021-06-18 RX ORDER — CLOPIDOGREL BISULFATE 75 MG/1
TABLET ORAL
Qty: 90 TABLET | Refills: 3 | Status: SHIPPED | OUTPATIENT
Start: 2021-06-18 | End: 2022-08-16

## 2021-06-18 RX ORDER — ATORVASTATIN CALCIUM 40 MG/1
TABLET, FILM COATED ORAL
Qty: 90 TABLET | Refills: 1 | Status: SHIPPED | OUTPATIENT
Start: 2021-06-18 | End: 2022-06-17

## 2021-06-18 NOTE — TELEPHONE ENCOUNTER
Routing refill request to provider for review/approval because:  Labs not current:  Hgb, Platelets    Tania Mars Rn

## 2021-06-18 NOTE — TELEPHONE ENCOUNTER
Lipitor  Prescription approved per [facility] Refill Protocol.    Tania Mars RN

## 2021-07-20 ENCOUNTER — OFFICE VISIT (OUTPATIENT)
Dept: AUDIOLOGY | Facility: CLINIC | Age: 81
End: 2021-07-20
Payer: COMMERCIAL

## 2021-07-20 DIAGNOSIS — H90.3 SENSORINEURAL HEARING LOSS, BILATERAL: Primary | ICD-10-CM

## 2021-07-20 PROCEDURE — V5010 ASSESSMENT FOR HEARING AID: HCPCS | Performed by: AUDIOLOGIST

## 2021-07-20 PROCEDURE — 99207 PR NO CHARGE LOS: CPT | Performed by: AUDIOLOGIST

## 2021-07-20 NOTE — PROGRESS NOTES
Hearing Aid Check     SUBJECTIVE:  Charlotte Snow is a 81 year old year old female, seen today for a hearing aid check at [facility]. Previous evaluation 12/6/2019 showed mild to moderate to severe sensorineural hearing loss bilaterally. The patient is currently using binaural Jennifer Halo i90 ERIC hearing aids. She is requesting maintenance and replacement filters as she is not hearing as well as she usually hears.     OBJECTIVE:  Programming changes were made to increased soft, moderate, and loud 2 increments 1.0-4.5k after hearing aid maintenance to vacuum fan ports and replace wax filters and domes (small closed dome).     PLAN:   Recommended return for audiology evaluation if she continues to struggle to hear conversation.     Cecilia Ordonez.  [place] Licensed Audiologist #[id number]

## 2021-07-29 DIAGNOSIS — E11.42 TYPE 2 DIABETES MELLITUS WITH DIABETIC POLYNEUROPATHY, WITHOUT LONG-TERM CURRENT USE OF INSULIN (H): ICD-10-CM

## 2021-08-02 NOTE — TELEPHONE ENCOUNTER
"Requested Prescriptions   Pending Prescriptions Disp Refills    metFORMIN (GLUCOPHAGE-XR) 750 MG 24 hr tablet [Pharmacy Med Name: METFORMIN HYDROCHLORIDE  MG Tablet Extended Release 24 Hour] 90 tablet 0     Sig: TAKE 1 TABLET EVERY DAY WITH DINNER        Biguanide Agents Failed - 7/29/2021  3:22 PM        Failed - Patient has documented A1c within the specified period of time.     If HgbA1C is 8 or greater, it needs to be on file within the past 3 months.  If less than 8, must be on file within the past 6 months.     Recent Labs   Lab Test 01/14/21  1026   A1C 6.7*             Failed - Recent (6 mo) or future (30 days) visit within the authorizing provider's specialty     Patient had office visit in the last 6 months or has a visit in the next 30 days with authorizing provider or within the authorizing provider's specialty.  See \"Patient Info\" tab in inbasket, or \"Choose Columns\" in Meds & Orders section of the refill encounter.            Passed - Patient is age 10 or older        Passed - Patient's CR is NOT>1.4 OR Patient's EGFR is NOT<45 within past 12 mos.       Recent Labs   Lab Test 01/14/21  1026   GFRESTIMATED 58*   GFRESTBLACK 67       Recent Labs   Lab Test 01/14/21  1026   CR 0.93             Passed - Patient does NOT have a diagnosis of CHF.        Passed - Medication is active on med list        Passed - Patient is not pregnant        Passed - Patient has not had a positive pregnancy test within the past 12 mos.             Last Written Prescription Date:  5/20/2021  Last Fill Quantity: 90,  # refills: 0   Last office visit: 1/14/2021 with prescribing provider:  Daisy   Future Office Visit:          Routing refill request to provider for review/approval because:  Labs out of range:  A1C  Labs not current:  A1C      Emy Durán RN  [facility]                 "

## 2021-08-03 RX ORDER — METFORMIN HYDROCHLORIDE 750 MG/1
TABLET, EXTENDED RELEASE ORAL
Qty: 90 TABLET | Refills: 0 | Status: SHIPPED | OUTPATIENT
Start: 2021-08-03 | End: 2022-01-12

## 2021-08-12 NOTE — PROGRESS NOTES
Outpatient Physical Therapy Discharge Note     Patient: Charlotte Snow  : 1940    Beginning/End Dates of Reporting Period:  1/15/2021 to 2021    Referring Provider: Sixto Gutierrez, DO Lyn Diagnosis: decreased strength of RLE, impaired gait mechanics, decreased proprioception, and decreased activity tolerance     Client Self Report: Pt reports that things have been going well, did have some pain with clamshells but stopped lifting knee as far which made it feel better    Objective Measurements:  Objective Measure: Pain  Details: 0/10    Goals:  Goal Identifier HEP   Goal Description Pt will demonstrate accurate compliance to her HEP >4 days per wk to independently progress strength and function   Target Date 21   Date Met      Progress (detail required for progress note):     Goal Identifier LEFS   Goal Description Pt will improve score on LEFS by >9 to show clinically significant improvement in tolerance for daily activity   Target Date 21   Date Met      Progress (detail required for progress note):     Goal Identifier Walking   Goal Description Pt will tolerate ambulating for >30 min at a time w/o increased knee pain to progress her ability for daily exercise   Target Date 21   Date Met      Progress (detail required for progress note):     Goal Identifier Strength   Goal Description Pt will be able to perform 10 squats with good control and no increased knee valgus or pain to show improvement in strength for mobility   Target Date 21   Date Met      Progress (detail required for progress note):       Plan:  Discharge from therapy.    Discharge:    Reason for Discharge: Patient chooses to discontinue therapy.  Patient has failed to schedule further appointments.    Equipment Issued: HEP    Discharge Plan: Patient to continue home program.

## 2021-08-25 ENCOUNTER — OFFICE VISIT (OUTPATIENT)
Dept: UROLOGY | Facility: CLINIC | Age: 81
End: 2021-08-25
Payer: COMMERCIAL

## 2021-08-25 VITALS
TEMPERATURE: 98.5 F | SYSTOLIC BLOOD PRESSURE: 130 MMHG | DIASTOLIC BLOOD PRESSURE: 76 MMHG | WEIGHT: 169.4 LBS | BODY MASS INDEX: 28.92 KG/M2 | HEIGHT: 64 IN

## 2021-08-25 DIAGNOSIS — N34.2 URETHRITIS: Primary | ICD-10-CM

## 2021-08-25 PROCEDURE — 99203 OFFICE O/P NEW LOW 30 MIN: CPT | Performed by: UROLOGY

## 2021-08-25 ASSESSMENT — MIFFLIN-ST. JEOR: SCORE: 1215.45
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
## 2021-08-28 ENCOUNTER — HEALTH MAINTENANCE LETTER (OUTPATIENT)
Age: 81
End: 2021-08-28

## 2021-10-23 ENCOUNTER — HEALTH MAINTENANCE LETTER (OUTPATIENT)
Age: 81
End: 2021-10-23

## 2021-10-28 ENCOUNTER — TELEPHONE (OUTPATIENT)
Dept: CARE COORDINATION | Facility: CLINIC | Age: 81
End: 2021-10-28

## 2021-10-28 ENCOUNTER — TELEPHONE (OUTPATIENT)
Dept: AUDIOLOGY | Facility: CLINIC | Age: 81
End: 2021-10-28

## 2021-10-28 DIAGNOSIS — N30.00 ACUTE CYSTITIS WITHOUT HEMATURIA: Primary | ICD-10-CM

## 2021-10-28 NOTE — TELEPHONE ENCOUNTER
Clinic Care Coordination Contact  Care Team Conversations    Pt called and left a VM requesting a triage nurse call her back regarding symptoms.       Thank you,       Linda TRAVIS, RN, PHN, [facility]  Primary Clinic Care Coordination    [facility]  Primary Care Clinics  [email or web address]   Office: [phone]  Employed by [facility]

## 2021-10-28 NOTE — TELEPHONE ENCOUNTER
Reason for Call:  Other wax guards    Detailed comments: Patient stopped by and is requesting wax guards    Phone Number Patient can be reached at: Home number on file [phone] (home) or Cell number on file:    Telephone Information:   Mobile [phone]       Best Time: any    Can we leave a detailed message on this number? YES    Call taken on 10/28/2021 at 2:41 PM by Sherry Vicente

## 2021-10-28 NOTE — TELEPHONE ENCOUNTER
CC states patient had called and left a message on [employer] stating she has a UTI, but does not want to come in for a visit.  She would like antibiotics and Diflucan sent in for her.    Patient is informed via [employer] that she can send in an Evisit or schedule a virtual visit to get labs ordered for this.  Will wait to see if she checks her MyChart.  Maryellen Rudd, BINGN, RN

## 2021-10-29 ENCOUNTER — E-VISIT (OUTPATIENT)
Dept: INTERNAL MEDICINE | Facility: CLINIC | Age: 81
End: 2021-10-29
Payer: COMMERCIAL

## 2021-10-29 DIAGNOSIS — N39.0 ACUTE UTI (URINARY TRACT INFECTION): Primary | ICD-10-CM

## 2021-10-29 PROCEDURE — 99421 OL DIG E/M SVC 5-10 MIN: CPT | Performed by: INTERNAL MEDICINE

## 2021-10-29 RX ORDER — NITROFURANTOIN 25; 75 MG/1; MG/1
100 CAPSULE ORAL 2 TIMES DAILY
Qty: 10 CAPSULE | Refills: 0 | Status: SHIPPED | OUTPATIENT
Start: 2021-10-29 | End: 2021-11-03

## 2021-10-29 NOTE — PATIENT INSTRUCTIONS
Dear Charlotte Snow    After reviewing your responses, I've been able to diagnose you with a urinary tract infection, which is a common infection of the bladder with bacteria.  This is not a sexually transmitted infection, though urinating immediately after intercourse can help prevent infections.  Drinking lots of fluids is also helpful to clear your current infection and prevent the next one.      I have sent a prescription for antibiotics to your pharmacy to treat this infection.    It is important that you take all of your prescribed medication even if your symptoms are improving after a few doses.  Taking all of your medicine helps prevent the symptoms from returning.     If your symptoms worsen, you develop pain in your back or stomach, develop fevers, or are not improving in 5 days, please contact your primary care provider for an appointment or visit any of our convenient Walk-in or Urgent Care Centers to be seen, which can be found on our website here.    Thanks again for choosing us as your health care partner,    Aron Villa,

## 2021-11-07 ENCOUNTER — LAB (OUTPATIENT)
Dept: LAB | Facility: CLINIC | Age: 81
End: 2021-11-07
Payer: COMMERCIAL

## 2021-11-07 DIAGNOSIS — N39.0 ACUTE UTI (URINARY TRACT INFECTION): ICD-10-CM

## 2021-11-07 DIAGNOSIS — N39.0 ACUTE UTI (URINARY TRACT INFECTION): Primary | ICD-10-CM

## 2021-11-07 LAB
ALBUMIN UR-MCNC: NEGATIVE MG/DL
APPEARANCE UR: ABNORMAL
BACTERIA #/AREA URNS HPF: ABNORMAL /HPF
BILIRUB UR QL STRIP: NEGATIVE
COLOR UR AUTO: YELLOW
GLUCOSE UR STRIP-MCNC: NEGATIVE MG/DL
HGB UR QL STRIP: NEGATIVE
HYALINE CASTS: 1 /LPF
KETONES UR STRIP-MCNC: NEGATIVE MG/DL
LEUKOCYTE ESTERASE UR QL STRIP: ABNORMAL
MUCOUS THREADS #/AREA URNS LPF: PRESENT /LPF
NITRATE UR QL: NEGATIVE
PH UR STRIP: 6 [PH] (ref 5–7)
RBC URINE: 2 /HPF
SP GR UR STRIP: 1.01 (ref 1–1.03)
SQUAMOUS EPITHELIAL: 2 /HPF
TRANSITIONAL EPI: 3 /HPF
UROBILINOGEN UR STRIP-MCNC: NORMAL MG/DL
WBC URINE: 37 /HPF

## 2021-11-07 PROCEDURE — 87086 URINE CULTURE/COLONY COUNT: CPT

## 2021-11-07 PROCEDURE — 81001 URINALYSIS AUTO W/SCOPE: CPT

## 2021-11-07 RX ORDER — NITROFURANTOIN 25; 75 MG/1; MG/1
100 CAPSULE ORAL 2 TIMES DAILY
Qty: 14 CAPSULE | Refills: 0 | Status: SHIPPED | OUTPATIENT
Start: 2021-11-07 | End: 2022-03-01

## 2021-11-08 LAB — BACTERIA UR CULT: NORMAL

## 2021-12-08 ENCOUNTER — E-VISIT (OUTPATIENT)
Dept: INTERNAL MEDICINE | Facility: CLINIC | Age: 81
End: 2021-12-08
Payer: COMMERCIAL

## 2021-12-08 DIAGNOSIS — N39.0 ACUTE UTI (URINARY TRACT INFECTION): ICD-10-CM

## 2021-12-08 DIAGNOSIS — N30.00 ACUTE CYSTITIS WITHOUT HEMATURIA: Primary | ICD-10-CM

## 2021-12-08 PROCEDURE — 99421 OL DIG E/M SVC 5-10 MIN: CPT | Performed by: INTERNAL MEDICINE

## 2021-12-08 RX ORDER — NITROFURANTOIN 25; 75 MG/1; MG/1
100 CAPSULE ORAL 2 TIMES DAILY
Qty: 10 CAPSULE | Refills: 0 | Status: SHIPPED | OUTPATIENT
Start: 2021-12-08 | End: 2021-12-13

## 2021-12-08 NOTE — PATIENT INSTRUCTIONS
Dear Charlotte Snow    After reviewing your responses, I've been able to diagnose you with a urinary tract infection, which is a common infection of the bladder with bacteria.  This is not a sexually transmitted infection, though urinating immediately after intercourse can help prevent infections.  Drinking lots of fluids is also helpful to clear your current infection and prevent the next one.      I have sent a prescription for antibiotics to your pharmacy to treat this infection.    It is important that you take all of your prescribed medication even if your symptoms are improving after a few doses.  Taking all of your medicine helps prevent the symptoms from returning.     If your symptoms worsen, you develop pain in your back or stomach, develop fevers, or are not improving in 5 days, please contact your primary care provider for an appointment or visit any of our convenient Walk-in or Urgent Care Centers to be seen, which can be found on our website here.    Thanks again for choosing us as your health care partner,    rAon Villa,

## 2021-12-24 ENCOUNTER — OFFICE VISIT (OUTPATIENT)
Dept: INTERNAL MEDICINE | Facility: CLINIC | Age: 81
End: 2021-12-24
Payer: COMMERCIAL

## 2021-12-24 VITALS
SYSTOLIC BLOOD PRESSURE: 138 MMHG | OXYGEN SATURATION: 97 % | DIASTOLIC BLOOD PRESSURE: 82 MMHG | BODY MASS INDEX: 28.19 KG/M2 | RESPIRATION RATE: 16 BRPM | HEART RATE: 82 BPM | TEMPERATURE: 96.6 F | WEIGHT: 161.7 LBS

## 2021-12-24 DIAGNOSIS — B37.31 YEAST INFECTION OF THE VAGINA: ICD-10-CM

## 2021-12-24 DIAGNOSIS — Z12.11 SCREEN FOR COLON CANCER: ICD-10-CM

## 2021-12-24 DIAGNOSIS — K21.00 GASTROESOPHAGEAL REFLUX DISEASE WITH ESOPHAGITIS WITHOUT HEMORRHAGE: Primary | ICD-10-CM

## 2021-12-24 PROCEDURE — 99214 OFFICE O/P EST MOD 30 MIN: CPT | Performed by: INTERNAL MEDICINE

## 2021-12-24 RX ORDER — FLUCONAZOLE 150 MG/1
TABLET ORAL
Qty: 8 TABLET | Refills: 0 | Status: SHIPPED | OUTPATIENT
Start: 2021-12-24 | End: 2022-03-01

## 2021-12-24 ASSESSMENT — PAIN SCALES - GENERAL: PAINLEVEL: SEVERE PAIN (7)

## 2021-12-24 NOTE — PROGRESS NOTES
"Charleen Porter is a 81 year old who presents for the following health issues     HPI     Chief Complaint   Patient presents with     Musculoskeletal Problem     pain under right arm after eating     Vomiting     nausea and vomiting        EMR reviewed including:             Complaint, History of Chief Complaint, Corresponding Review of Systems, and Complaint Specific Physical Examination.    #1   Recurrent nausea following meals.  Delayed gastric emptying.  Frequent belching and burping.  History of \"hiatal hernia\".  Significant reflux with any bending over.  Early satiety.        Exam:   GI: Abdomen is soft, without rebound, guarding or tenderness. Bowel sounds are appropriate. No renal bruits are heard.   LUNGS: clear bilaterally, airflow is brisk, no intercostal retraction or stridor is noted. No coughing is noted during visit.   HEART:  regular without rubs, clicks, gallops, or murmurs. PMI is nondisplaced. Upstrokes are brisk. S1,S2 are heard.      #2   Genital burning.  Was recently on Macrobid for urinary tract infection.  Has history of recurrent yeast infections following antibiotic usage.  Denies frequency of urination, flank pain, fever, chills.      #3   History of colon polyps.  Last endoscopy was about 5 years ago.  He is due for endoscopy.  Denies melena or hematochezia.        Exam:   GI: Abdomen is soft, without rebound, guarding or tenderness. Bowel sounds are appropriate. No renal bruits are heard.        Patient has been interviewed, applicable history and applied review of systems have been performed.    Vital Signs:   /82   Pulse 82   Temp (!) 96.6  F (35.9  C) (Temporal)   Resp 16   Wt 73.3 kg (161 lb 11.2 oz)   SpO2 97%   BMI 28.19 kg/m        Decision Making    Problem and Complexity     1. Gastroesophageal reflux disease with esophagitis without hemorrhage  We will set up EGD and colonoscopy.  Recommend smaller meals.  Recommend elevation of the head of the bed.    - " "Adult Gastro Ref - Procedure Only; Future    2. Screen for colon cancer    - Adult Gastro Ref - Procedure Only; Future    3. Yeast infection of the vagina  Diflucan given. Patient will be given an additional course to have \"on hand\" instructed specifically on indicated stitch.  - fluconazole (DIFLUCAN) 150 MG tablet; 1 pill every third day for 4 doses  Dispense: 8 tablet; Refill: 0                                FOLLOW UP   I have asked the patient to make an appointment for followup with me in 1 month        I have carefully explained the diagnosis and treatment options to the patient.  The patient has displayed an understanding of the above, and all subsequent questions were answered.      DO JT Thompson    Portions of this note were produced using [employer]  Although every attempt at real-time proof reading has been made, occasional grammar/syntax errors may have been missed.    "

## 2021-12-27 ENCOUNTER — DOCUMENTATION ONLY (OUTPATIENT)
Dept: OTHER | Facility: CLINIC | Age: 81
End: 2021-12-27
Payer: COMMERCIAL

## 2022-01-01 ENCOUNTER — TRANSFERRED RECORDS (OUTPATIENT)
Dept: MULTI SPECIALTY CLINIC | Facility: CLINIC | Age: 82
End: 2022-01-01

## 2022-01-01 LAB — RETINOPATHY: NORMAL

## 2022-01-04 ENCOUNTER — PATIENT OUTREACH (OUTPATIENT)
Dept: CARE COORDINATION | Facility: CLINIC | Age: 82
End: 2022-01-04
Payer: COMMERCIAL

## 2022-01-04 NOTE — PROGRESS NOTES
Pt called stating she was exposed to someone who was positive for covid. She would like to get tested. Could someone please call pt and let her know the current process for testing.     Thank you,       Linda TRAVIS, RN, PHN, [facility]  Primary Clinic Care Coordination    [facility]  Primary Care Clinics  [email or web address]   Office: [phone]  Employed by [facility]

## 2022-01-04 NOTE — TELEPHONE ENCOUNTER
"Everybody has been exposed to someone with Covid.  Kind of the definition of \"pandemic\".  If she is not having symptoms, there is no reason to test her.  If she develops symptoms, please let me know and I will place the order.    Also, commercially available home tests are commercially available.    Daisy"

## 2022-01-09 ENCOUNTER — MYC MEDICAL ADVICE (OUTPATIENT)
Dept: INTERNAL MEDICINE | Facility: CLINIC | Age: 82
End: 2022-01-09
Payer: COMMERCIAL

## 2022-01-10 DIAGNOSIS — E11.42 TYPE 2 DIABETES MELLITUS WITH DIABETIC POLYNEUROPATHY, WITHOUT LONG-TERM CURRENT USE OF INSULIN (H): ICD-10-CM

## 2022-01-12 RX ORDER — METFORMIN HYDROCHLORIDE 750 MG/1
TABLET, EXTENDED RELEASE ORAL
Qty: 90 TABLET | Refills: 0 | Status: SHIPPED | OUTPATIENT
Start: 2022-01-12 | End: 2022-03-17

## 2022-01-12 RX ORDER — GLIMEPIRIDE 2 MG/1
TABLET ORAL
Qty: 90 TABLET | Refills: 3 | Status: SHIPPED | OUTPATIENT
Start: 2022-01-12 | End: 2022-03-01

## 2022-01-25 ENCOUNTER — TELEPHONE (OUTPATIENT)
Dept: GASTROENTEROLOGY | Facility: CLINIC | Age: 82
End: 2022-01-25
Payer: COMMERCIAL

## 2022-01-25 NOTE — TELEPHONE ENCOUNTER
Screening Questions  Blue=prep questions Red=location Green=sedation   1. Are you active on mychart? Y    2. What insurance is in the chart? BCBS     3.  Ordering/Referring Provider: Aron Villa    4. BMI 28.1 , If greater than 40 review exclusion criteria also will need EXTENDED PREP    5.  Respiratory Screening (If yes to any of the following HOSPITAL setting only):     Do you use daily home oxygen? N  Do you have mod to severe Obstructive Sleep Apnea? N (can be seen at [facility] or hospital setting)    Do you have Pulmonary Hypertension? N   Do you have UNCONTROLLED asthma? N    6. Have you had a heart or lung transplant? N  (If yes, please review exclusion criteria)    7. Are you currently on dialysis?N  (If yes, schedule in HOSPITAL setting only)(If yes, please send Golytely prep)    8. Do you have chronic kidney disease? N (If yes, please send Golytely prep)    9. Have you had a stroke or Transient ischemic attack (TIA) within 6 months? N (If yes, do not schedule at [facility])    10. In the past 6 months, have you had any heart related issues including cardiomyopathy or heart attack? N (If yes, please review exclusion criteria) Stroke a few years back           If yes, did it require cardiac stenting or other implantable device?N  (If yes, please review exclusion criteria)      11. Do you have any implantable devices in your body (pacemaker, defib, LVAD)? N (If yes, schedule at [facility])    12. Do you take nitroglycerin? If yes, how often? N (if yes, schedule at HOSPITAL setting)    13. Are you currently taking any blood thinners?Y (If yes- inform patient to follow up with PCP or provider for follow up instructions)     14. Are you a diabetic? Y (If yes, please send Golytely prep)    15. (Females) Are you currently pregnant? N  If yes, how many weeks?      16. Are you taking any prescription pain medications on a routine schedule? N If yes, MAC sedation and patient will need EXTENDED PREP.    17. Do you have any  chemical dependencies such as alcohol, street drugs, or methadone? N If yes, MAC sedation     18. Do you have any history of post-traumatic stress syndrome, severe anxiety or history of psychosis? N  If yes, MAC sedation.     19. Do you transfer independently? Y    20.  Do you have any issues with constipation? N   If yes, pt will need EXTENDED PREP     21. Preferred Pharmacy for Pre Prescription [id number] - [place],    Scheduling Details    Which Colonoscopy Prep was Sent?: Golytely  Type of Procedure Scheduled: Colon/EGD  Surgeon: Daniel  Date of Procedure: 2-17  Location:   Caller (Please ask for phone number if not scheduled by patient): Charlotte      Sedation Type: MAC  Conscious Sedation- Needs  for 6 hours after the procedure  MAC/General-Needs  for 24 hours after procedure    Pre-op Required at [facility], [place], [place] and OR for MAC sedation:   (if yes advise patient they will need a pre-op prior to procedure)      Informed patient they will need an adult  Y  Cannot take any type of public or medical transportation alone    Pre-Procedure Covid test to be completed at [facility] or Externally: Not needed    Confirmed Nurse will call to complete assessment Y    Additional comments:  (DE DEMETRI'S PATIENTS NEED EXTENDED PREP)

## 2022-01-26 ENCOUNTER — E-VISIT (OUTPATIENT)
Dept: FAMILY MEDICINE | Facility: CLINIC | Age: 82
End: 2022-01-26
Payer: COMMERCIAL

## 2022-01-26 DIAGNOSIS — N39.0 ACUTE UTI (URINARY TRACT INFECTION): Primary | ICD-10-CM

## 2022-01-26 PROCEDURE — 99421 OL DIG E/M SVC 5-10 MIN: CPT | Performed by: INTERNAL MEDICINE

## 2022-01-27 RX ORDER — NITROFURANTOIN 25; 75 MG/1; MG/1
100 CAPSULE ORAL 2 TIMES DAILY
Qty: 10 CAPSULE | Refills: 0 | Status: SHIPPED | OUTPATIENT
Start: 2022-01-27 | End: 2022-02-01

## 2022-01-27 NOTE — PATIENT INSTRUCTIONS
Dear Charlotte Snow    After reviewing your responses, I've been able to diagnose you with a urinary tract infection, which is a common infection of the bladder with bacteria.  This is not a sexually transmitted infection, though urinating immediately after intercourse can help prevent infections.  Drinking lots of fluids is also helpful to clear your current infection and prevent the next one.      I have sent a prescription for antibiotics to your pharmacy to treat this infection.    It is important that you take all of your prescribed medication even if your symptoms are improving after a few doses.  Taking all of your medicine helps prevent the symptoms from returning.     If your symptoms worsen, you develop pain in your back or stomach, develop fevers, or are not improving in 5 days, please contact your primary care provider for an appointment or visit any of our convenient Walk-in or Urgent Care Centers to be seen, which can be found on our website here.    Thanks again for choosing us as your health care partner,    Aronjamin Carranza Daisy, DO    Urinary Tract Infections in Women  Urinary tract infections (UTIs) are most often caused by bacteria. These bacteria enter the urinary tract. The bacteria may come from inside the body. Or they may travel from the skin outside the rectum or vagina into the urethra. Female anatomy makes it easy for bacteria from the bowel to enter a woman s urinary tract, which is the most common source of UTI. This means women develop UTIs more often than men. Pain in or around the urinary tract is a common UTI symptom. But the only way to know for sure if you have a UTI for the healthcare provider to test your urine. The two tests that may be done are the urinalysis and urine culture.     Types of UTIs    Cystitis. A bladder infection (cystitis) is the most common UTI in women. You may have urgent or frequent need to pee. You may also have pain, burning when you pee, and  bloody urine.    Urethritis. This is an inflamed urethra, which is the tube that carries urine from the bladder to outside the body. You may have lower stomach or back pain. You may also have urgent or frequent need to pee.    Pyelonephritis. This is a kidney infection. If not treated, it can be serious and damage your kidneys. In severe cases, you may need to stay in the hospital. You may have a fever and lower back pain.    Medicines to treat a UTI  Most UTIs are treated with antibiotics. These kill the bacteria. The length of time you need to take them depends on the type of infection. It may be as short as 3 days. If you have repeated UTIs, you may need a low-dose antibiotic for several months. Take antibiotics exactly as directed. Don t stop taking them until all of the medicine is gone. If you stop taking the antibiotic too soon, the infection may not go away. You may also develop a resistance to the antibiotic. This can make it much harder to treat.   Lifestyle changes to treat and prevent UTIs   The lifestyle changes below will help get rid of your UTI. They may also help prevent future UTIs.     Drink plenty of fluids. This includes water, juice, or other caffeine-free drinks. Fluids help flush bacteria out of your body.    Empty your bladder. Always empty your bladder when you feel the urge to pee. And always pee before going to sleep. Urine that stays in your bladder can lead to infection. Try to pee before and after sex as well.    Practice good personal hygiene. Wipe yourself from front to back after using the toilet. This helps keep bacteria from getting into the urethra.    Use condoms during sex. These help prevent UTIs caused by sexually transmitted bacteria. Also don't use spermicides during sex. These can increase the risk for UTIs. Choose other forms of birth control instead. For women who tend to get UTIs after sex, a low-dose of a preventive antibiotic may be used. Be sure to discuss this  option with your healthcare provider.    Follow up with your healthcare provider as directed. He or she may test to make sure the infection has cleared. If needed, more treatment may be started.  Jacob last reviewed this educational content on 7/1/2019 2000-2021 [employer]. All rights reserved. This information is not intended as a substitute for professional medical care. Always follow your healthcare professional's instructions.

## 2022-01-28 ENCOUNTER — TELEPHONE (OUTPATIENT)
Dept: AUDIOLOGY | Facility: CLINIC | Age: 82
End: 2022-01-28

## 2022-01-28 NOTE — TELEPHONE ENCOUNTER
Reason for Call:  Other appointment    Detailed comments: Patients daughter Ruba calling because her Mom got a new phone and isn't able to pair this up with her cell phone. Please call joan Yeh at [phone]    Phone Number Patient can be reached at:   Best Time: any    Can we leave a detailed message on this number? YES    Call taken on 1/28/2022 at 12:42 PM by Maryellen Mackey

## 2022-01-31 NOTE — TELEPHONE ENCOUNTER
Daughter was able to resolve the connectivity issue with Charlotte's new iPhone 13. Chelo is installed and working too.  Mynor Webster

## 2022-02-03 DIAGNOSIS — Z11.59 ENCOUNTER FOR SCREENING FOR OTHER VIRAL DISEASES: Primary | ICD-10-CM

## 2022-02-11 ENCOUNTER — TELEPHONE (OUTPATIENT)
Dept: INTERNAL MEDICINE | Facility: CLINIC | Age: 82
End: 2022-02-11
Payer: COMMERCIAL

## 2022-02-11 NOTE — TELEPHONE ENCOUNTER
Pt is still having itching sensation from yeast infection from antibiotics from her uti and procedure scheduled for next Thursday. Should see get another UA to make sure uti is gone, if so can you put in order?

## 2022-02-12 ENCOUNTER — HEALTH MAINTENANCE LETTER (OUTPATIENT)
Age: 82
End: 2022-02-12

## 2022-02-14 DIAGNOSIS — E11.42 TYPE 2 DIABETES MELLITUS WITH DIABETIC POLYNEUROPATHY, WITHOUT LONG-TERM CURRENT USE OF INSULIN (H): Primary | ICD-10-CM

## 2022-02-14 NOTE — OR NURSING
Pt reports vomiting with Golytle colon prep. Requests to do Mirilax and Gatorade. Msg sent to PMD to request if this is ok for pt to do based on her health. PMD ordered blood work for pt to get done prior to doing Mirilax prep ad IF renal labs are WNL pt is ok to take Mirilax prep. Pt will come in for this blood work tomorrow morning 2/15/22. Also pt will bring proof of her positive Vault covid test to [facility] dept. On 2/15.

## 2022-02-15 ENCOUNTER — LAB (OUTPATIENT)
Dept: LAB | Facility: CLINIC | Age: 82
End: 2022-02-15
Payer: COMMERCIAL

## 2022-02-15 DIAGNOSIS — E11.42 TYPE 2 DIABETES MELLITUS WITH DIABETIC POLYNEUROPATHY, WITHOUT LONG-TERM CURRENT USE OF INSULIN (H): ICD-10-CM

## 2022-02-15 LAB
ALBUMIN SERPL-MCNC: 3.8 G/DL (ref 3.4–5)
ALP SERPL-CCNC: 76 U/L (ref 40–150)
ALT SERPL W P-5'-P-CCNC: 24 U/L (ref 0–50)
ANION GAP SERPL CALCULATED.3IONS-SCNC: 5 MMOL/L (ref 3–14)
AST SERPL W P-5'-P-CCNC: 13 U/L (ref 0–45)
BILIRUB SERPL-MCNC: 0.3 MG/DL (ref 0.2–1.3)
BUN SERPL-MCNC: 14 MG/DL (ref 7–30)
CALCIUM SERPL-MCNC: 9.8 MG/DL (ref 8.5–10.1)
CHLORIDE BLD-SCNC: 98 MMOL/L (ref 94–109)
CHOLEST SERPL-MCNC: 130 MG/DL
CO2 SERPL-SCNC: 31 MMOL/L (ref 20–32)
CREAT SERPL-MCNC: 0.77 MG/DL (ref 0.52–1.04)
CREAT UR-MCNC: 76 MG/DL
FASTING STATUS PATIENT QL REPORTED: NO
GFR SERPL CREATININE-BSD FRML MDRD: 78 ML/MIN/1.73M2
GLUCOSE BLD-MCNC: 120 MG/DL (ref 70–99)
HBA1C MFR BLD: 6.8 % (ref 0–5.6)
HDLC SERPL-MCNC: 73 MG/DL
LDLC SERPL CALC-MCNC: 36 MG/DL
MICROALBUMIN UR-MCNC: 15 MG/L
MICROALBUMIN/CREAT UR: 19.74 MG/G CR (ref 0–25)
NONHDLC SERPL-MCNC: 57 MG/DL
POTASSIUM BLD-SCNC: 4 MMOL/L (ref 3.4–5.3)
PROT SERPL-MCNC: 7.4 G/DL (ref 6.8–8.8)
SODIUM SERPL-SCNC: 134 MMOL/L (ref 133–144)
TRIGL SERPL-MCNC: 104 MG/DL

## 2022-02-15 PROCEDURE — 83036 HEMOGLOBIN GLYCOSYLATED A1C: CPT

## 2022-02-15 PROCEDURE — 82043 UR ALBUMIN QUANTITATIVE: CPT

## 2022-02-15 PROCEDURE — 80061 LIPID PANEL: CPT

## 2022-02-15 PROCEDURE — 36415 COLL VENOUS BLD VENIPUNCTURE: CPT

## 2022-02-15 PROCEDURE — 80053 COMPREHEN METABOLIC PANEL: CPT

## 2022-02-16 ENCOUNTER — ANESTHESIA EVENT (OUTPATIENT)
Dept: GASTROENTEROLOGY | Facility: CLINIC | Age: 82
End: 2022-02-16
Payer: MEDICARE

## 2022-02-17 ENCOUNTER — ANESTHESIA (OUTPATIENT)
Dept: GASTROENTEROLOGY | Facility: CLINIC | Age: 82
End: 2022-02-17
Payer: MEDICARE

## 2022-02-17 ENCOUNTER — HOSPITAL ENCOUNTER (OUTPATIENT)
Facility: CLINIC | Age: 82
Discharge: HOME | End: 2022-02-17
Attending: SURGERY | Admitting: SURGERY
Payer: MEDICARE

## 2022-02-17 VITALS
HEART RATE: 70 BPM | DIASTOLIC BLOOD PRESSURE: 71 MMHG | TEMPERATURE: 97.3 F | RESPIRATION RATE: 16 BRPM | SYSTOLIC BLOOD PRESSURE: 127 MMHG | OXYGEN SATURATION: 96 %

## 2022-02-17 LAB
COLONOSCOPY: NORMAL
GLUCOSE BLDC GLUCOMTR-MCNC: 129 MG/DL (ref 70–99)
GLUCOSE BLDC GLUCOMTR-MCNC: 130 MG/DL (ref 70–99)
UPPER GI ENDOSCOPY: NORMAL

## 2022-02-17 PROCEDURE — 45378 DIAGNOSTIC COLONOSCOPY: CPT | Performed by: SURGERY

## 2022-02-17 PROCEDURE — 250N000009 HC RX 250: Performed by: NURSE ANESTHETIST, CERTIFIED REGISTERED

## 2022-02-17 PROCEDURE — 43239 EGD BIOPSY SINGLE/MULTIPLE: CPT | Performed by: SURGERY

## 2022-02-17 PROCEDURE — 88305 TISSUE EXAM BY PATHOLOGIST: CPT | Mod: TC | Performed by: SURGERY

## 2022-02-17 PROCEDURE — 45385 COLONOSCOPY W/LESION REMOVAL: CPT | Mod: PT | Performed by: SURGERY

## 2022-02-17 PROCEDURE — 82962 GLUCOSE BLOOD TEST: CPT

## 2022-02-17 PROCEDURE — 258N000003 HC RX IP 258 OP 636: Performed by: SURGERY

## 2022-02-17 PROCEDURE — 250N000011 HC RX IP 250 OP 636: Performed by: NURSE ANESTHETIST, CERTIFIED REGISTERED

## 2022-02-17 PROCEDURE — 370N000017 HC ANESTHESIA TECHNICAL FEE, PER MIN: Performed by: SURGERY

## 2022-02-17 PROCEDURE — 43235 EGD DIAGNOSTIC BRUSH WASH: CPT | Performed by: SURGERY

## 2022-02-17 PROCEDURE — 43239 EGD BIOPSY SINGLE/MULTIPLE: CPT | Mod: 51 | Performed by: SURGERY

## 2022-02-17 PROCEDURE — 250N000009 HC RX 250: Performed by: SURGERY

## 2022-02-17 RX ORDER — NALOXONE HYDROCHLORIDE 0.4 MG/ML
0.2 INJECTION, SOLUTION INTRAMUSCULAR; INTRAVENOUS; SUBCUTANEOUS
Status: CANCELLED | OUTPATIENT
Start: 2022-02-17

## 2022-02-17 RX ORDER — ONDANSETRON 2 MG/ML
4 INJECTION INTRAMUSCULAR; INTRAVENOUS EVERY 6 HOURS PRN
Status: CANCELLED | OUTPATIENT
Start: 2022-02-17

## 2022-02-17 RX ORDER — ONDANSETRON 4 MG/1
4 TABLET, ORALLY DISINTEGRATING ORAL EVERY 6 HOURS PRN
Status: CANCELLED | OUTPATIENT
Start: 2022-02-17

## 2022-02-17 RX ORDER — FLUMAZENIL 0.1 MG/ML
0.2 INJECTION, SOLUTION INTRAVENOUS
Status: CANCELLED | OUTPATIENT
Start: 2022-02-17 | End: 2022-02-17

## 2022-02-17 RX ORDER — SODIUM CHLORIDE, SODIUM LACTATE, POTASSIUM CHLORIDE, CALCIUM CHLORIDE 600; 310; 30; 20 MG/100ML; MG/100ML; MG/100ML; MG/100ML
INJECTION, SOLUTION INTRAVENOUS CONTINUOUS
Status: DISCONTINUED | OUTPATIENT
Start: 2022-02-17 | End: 2022-02-17 | Stop reason: HOSPADM

## 2022-02-17 RX ORDER — PROCHLORPERAZINE MALEATE 5 MG
5 TABLET ORAL EVERY 6 HOURS PRN
Status: CANCELLED | OUTPATIENT
Start: 2022-02-17

## 2022-02-17 RX ORDER — PROPOFOL 10 MG/ML
INJECTION, EMULSION INTRAVENOUS PRN
Status: DISCONTINUED | OUTPATIENT
Start: 2022-02-17 | End: 2022-02-17

## 2022-02-17 RX ORDER — ONDANSETRON 2 MG/ML
INJECTION INTRAMUSCULAR; INTRAVENOUS PRN
Status: DISCONTINUED | OUTPATIENT
Start: 2022-02-17 | End: 2022-02-17

## 2022-02-17 RX ORDER — NALOXONE HYDROCHLORIDE 0.4 MG/ML
0.4 INJECTION, SOLUTION INTRAMUSCULAR; INTRAVENOUS; SUBCUTANEOUS
Status: CANCELLED | OUTPATIENT
Start: 2022-02-17

## 2022-02-17 RX ORDER — ONDANSETRON 2 MG/ML
4 INJECTION INTRAMUSCULAR; INTRAVENOUS
Status: DISCONTINUED | OUTPATIENT
Start: 2022-02-17 | End: 2022-02-17 | Stop reason: HOSPADM

## 2022-02-17 RX ORDER — PROPOFOL 10 MG/ML
INJECTION, EMULSION INTRAVENOUS CONTINUOUS PRN
Status: DISCONTINUED | OUTPATIENT
Start: 2022-02-17 | End: 2022-02-17

## 2022-02-17 RX ORDER — LIDOCAINE HYDROCHLORIDE 20 MG/ML
INJECTION, SOLUTION INFILTRATION; PERINEURAL PRN
Status: DISCONTINUED | OUTPATIENT
Start: 2022-02-17 | End: 2022-02-17

## 2022-02-17 RX ORDER — LIDOCAINE 40 MG/G
CREAM TOPICAL
Status: DISCONTINUED | OUTPATIENT
Start: 2022-02-17 | End: 2022-02-17 | Stop reason: HOSPADM

## 2022-02-17 RX ADMIN — LIDOCAINE HYDROCHLORIDE 1 ML: 10 INJECTION, SOLUTION EPIDURAL; INFILTRATION; INTRACAUDAL; PERINEURAL at 07:37

## 2022-02-17 RX ADMIN — PROPOFOL 80 MG: 10 INJECTION, EMULSION INTRAVENOUS at 08:15

## 2022-02-17 RX ADMIN — SODIUM CHLORIDE, POTASSIUM CHLORIDE, SODIUM LACTATE AND CALCIUM CHLORIDE: 600; 310; 30; 20 INJECTION, SOLUTION INTRAVENOUS at 07:48

## 2022-02-17 RX ADMIN — ONDANSETRON 4 MG: 2 INJECTION INTRAMUSCULAR; INTRAVENOUS at 08:33

## 2022-02-17 RX ADMIN — PROPOFOL 200 MCG/KG/MIN: 10 INJECTION, EMULSION INTRAVENOUS at 08:15

## 2022-02-17 RX ADMIN — LIDOCAINE HYDROCHLORIDE 60 MG: 20 INJECTION, SOLUTION INFILTRATION; PERINEURAL at 08:15

## 2022-02-17 NOTE — LETTER
Charlotte Snow  [address] [place] [facility] [id number]    February 24, 2022      Dear Charlotte,  This letter is to inform you of the results of your pathology report from your endoscopy.  Your pathology report was:  Final Diagnosis   A.  Stomach, antrum, mucosal biopsy:  - Benign antral gastric mucosa with mild reactive change in glandular epithelium and moderate chronic inflammation.  - Immunostain for Helicobacter is negative.   B.  Descending colon, mucosal biopsy:  - Tubular adenoma and normal colonic mucosa with a small benign lymphoid aggregate.  - Negative for high grade dysplasia and malignancy.      In the stomach, there is no infection that requires additional treatment. In the colon, these are benign polyps. These types of polyps do carry a small risk of developing into a cancer over time if not removed. Yours were completely removed at the time of your colonoscopy. You should have another surveillance colonoscopy in 5 years.  If you have further questions please don t hesitate to call our clinic at [phone].   Sincerely,     Deven Sanchez, DO
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
## 2022-02-17 NOTE — ANESTHESIA CARE TRANSFER NOTE
Patient: Charlotte Snow    Procedure: Procedure(s):  ESOPHAGOGASTRODUODENOSCOPY (EGD) with biopsies  COLONOSCOPY WITH POLYPECTOMY       Diagnosis: Gastroesophageal reflux disease with esophagitis without hemorrhage [K21.00]  Diagnosis Additional Information: No value filed.    Anesthesia Type:   MAC     Note:    Oropharynx: oropharynx clear of all foreign objects and spontaneously breathing  Level of Consciousness: awake  Oxygen Supplementation: room air    Independent Airway: airway patency satisfactory and stable  Dentition: dentition unchanged  Vital Signs Stable: post-procedure vital signs reviewed and stable  Report to RN Given: handoff report given  Patient transferred to: Phase II    Handoff Report: Identifed the Patient, Identified the Reponsible Provider, Reviewed the pertinent medical history, Discussed the surgical course, Reviewed Intra-OP anesthesia mangement and issues during anesthesia, Set expectations for post-procedure period and Allowed opportunity for questions and acknowledgement of understanding      Vitals:  Vitals Value Taken Time   BP     Temp     Pulse     Resp     SpO2 96 % 02/17/22 0855   Vitals shown include unvalidated device data.    Electronically Signed By: ERIC Hernadez CRNA  February 17, 2022  8:57 AM
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
## 2022-02-17 NOTE — ANESTHESIA POSTPROCEDURE EVALUATION
Patient: Charlotte Snow    Procedure: Procedure(s):  ESOPHAGOGASTRODUODENOSCOPY (EGD) with biopsies  COLONOSCOPY WITH POLYPECTOMY       Diagnosis:Gastroesophageal reflux disease with esophagitis without hemorrhage [K21.00]  Diagnosis Additional Information: No value filed.    Anesthesia Type:  MAC    Note:  Disposition: Outpatient   Postop Pain Control: Uneventful            Sign Out: Well controlled pain   PONV: No   Neuro/Psych: Uneventful            Sign Out: Acceptable/Baseline neuro status   Airway/Respiratory: Uneventful            Sign Out: Acceptable/Baseline resp. status   CV/Hemodynamics: Uneventful            Sign Out: Acceptable CV status   Other NRE: NONE   DID A NON-ROUTINE EVENT OCCUR? No    Event details/Postop Comments:  Pt was happy with anesthesia care.  No complications.  I will follow up with the pt if needed.           Last vitals:  Vitals Value Taken Time   /71 02/17/22 0915   Temp     Pulse 70 02/17/22 0915   Resp 16 02/17/22 0915   SpO2 94 % 02/17/22 0901   Vitals shown include unvalidated device data.    Electronically Signed By: ERIC Hernadez CRNA  February 17, 2022  9:18 AM

## 2022-02-17 NOTE — DISCHARGE INSTRUCTIONS
[facility]    Home Care Following Endoscopy          Activity:    You have just undergone an endoscopic procedure usually performed with conscious sedation.  Do not work or operate machinery (including a car) for at least 12 hours.      I encourage you to walk and attempt to pass this air as soon as possible.    Diet:    Return to the diet you were on before your procedure but eat lightly for the first 12-24 hours.    Drink plenty of water.    Resume any regular medications unless otherwise advised by your physician.  Please begin any new medication prescribed as a result of your procedure as directed by your physician.     If you had any biopsy or polyp removed please refrain from aspirin or aspirin products for 2 days.  If on Coumadin please restart as instructed by your physician.   Pain:    You may take Tylenol as needed for pain.  Expected Recovery:    You can expect some mild abdominal fullness and/or discomfort due to the air used to inflate your intestinal tract. It is also normal to have a mild sore throat after upper endoscopy.    Call Your Physician if You Have:    After Upper Endoscopy:  o Shoulder, back or chest pain.  o Difficulty breathing or swallowing.  o Vomiting blood.    After Colonoscopy:  o Worsening persisting abdominal pain which is worse with activity.  o Fevers (>101 degrees F), chills or shakes.  o Passage of continued blood with bowel movements.   Any questions or concerns about your recovery, please call [phone] or after hours [phone] Nurse Advice Line.    Follow-up Care:  You did have polyps/biopsy tissue sample(s) removed.  The polyps/biopsy tissue sample(s) will be sent to pathology.    You should receive letter in your My Chart  with your results within 1-2 weeks. If you do not participate in My Chart a physical letter will come in the mail in 2-3 weeks.    Please call if you have not received a notification of your results.  If asked to return to clinic  please make an appointment 1 week after your procedure.  Call [phone].

## 2022-02-20 PROCEDURE — 88305 TISSUE EXAM BY PATHOLOGIST: CPT | Mod: 26 | Performed by: PATHOLOGY

## 2022-02-20 PROCEDURE — 88342 IMHCHEM/IMCYTCHM 1ST ANTB: CPT | Mod: 26 | Performed by: PATHOLOGY

## 2022-02-22 LAB
PATH REPORT.ADDENDUM SPEC: NORMAL
PATH REPORT.COMMENTS IMP SPEC: NORMAL
PATH REPORT.COMMENTS IMP SPEC: NORMAL
PATH REPORT.FINAL DX SPEC: NORMAL
PATH REPORT.GROSS SPEC: NORMAL
PATH REPORT.MICROSCOPIC SPEC OTHER STN: NORMAL
PATH REPORT.RELEVANT HX SPEC: NORMAL
PHOTO IMAGE: NORMAL

## 2022-02-28 DIAGNOSIS — E11.42 TYPE 2 DIABETES MELLITUS WITH DIABETIC POLYNEUROPATHY, WITHOUT LONG-TERM CURRENT USE OF INSULIN (H): ICD-10-CM

## 2022-03-01 ENCOUNTER — OFFICE VISIT (OUTPATIENT)
Dept: INTERNAL MEDICINE | Facility: CLINIC | Age: 82
End: 2022-03-01
Payer: COMMERCIAL

## 2022-03-01 VITALS
TEMPERATURE: 98 F | RESPIRATION RATE: 16 BRPM | BODY MASS INDEX: 27.49 KG/M2 | HEIGHT: 64 IN | DIASTOLIC BLOOD PRESSURE: 76 MMHG | WEIGHT: 161 LBS | OXYGEN SATURATION: 95 % | HEART RATE: 82 BPM | SYSTOLIC BLOOD PRESSURE: 126 MMHG

## 2022-03-01 DIAGNOSIS — I47.10 SVT (SUPRAVENTRICULAR TACHYCARDIA) (H): ICD-10-CM

## 2022-03-01 DIAGNOSIS — M54.50 ACUTE MIDLINE LOW BACK PAIN WITHOUT SCIATICA: Primary | ICD-10-CM

## 2022-03-01 DIAGNOSIS — K21.9 GASTROESOPHAGEAL REFLUX DISEASE WITHOUT ESOPHAGITIS: ICD-10-CM

## 2022-03-01 DIAGNOSIS — E11.42 TYPE 2 DIABETES MELLITUS WITH DIABETIC POLYNEUROPATHY, WITHOUT LONG-TERM CURRENT USE OF INSULIN (H): ICD-10-CM

## 2022-03-01 PROCEDURE — 99214 OFFICE O/P EST MOD 30 MIN: CPT | Performed by: INTERNAL MEDICINE

## 2022-03-01 RX ORDER — PANTOPRAZOLE SODIUM 40 MG/1
40 TABLET, DELAYED RELEASE ORAL DAILY
Qty: 90 TABLET | Refills: 0 | Status: SHIPPED | OUTPATIENT
Start: 2022-03-01 | End: 2022-05-06

## 2022-03-01 RX ORDER — TRAMADOL HYDROCHLORIDE 50 MG/1
50 TABLET ORAL EVERY 6 HOURS PRN
Qty: 20 TABLET | Refills: 0 | Status: SHIPPED | OUTPATIENT
Start: 2022-03-01 | End: 2022-03-04

## 2022-03-01 NOTE — PROGRESS NOTES
Charleen Porter is a 81 year old who presents for the following health issues     History of Present Illness       She eats 4 or more servings of fruits and vegetables daily.She consumes 0 sweetened beverage(s) daily.She exercises with enough effort to increase her heart rate 30 to 60 minutes per day.  She exercises with enough effort to increase her heart rate 3 or less days per week.   She is taking medications regularly.       Chief Complaint   Patient presents with     Diabetes     Results     discuss endoscopy results        EMR reviewed including:             Complaint, History of Chief Complaint, Corresponding Review of Systems, and Complaint Specific Physical Examination.    #1   Intermittent hypoglycemia  Taking Metformin and glimepiride  Sugars dropping into the low 60s  Minimal symptomatology prior to episodes.  Eating well, taking medications regularly.        Exam:   Constitutional: The patient appears to be in no acute distress. The patient appears to be adequately hydrated. No acute respiratory or hemodynamic distress is noted at this time.      #2   Low back pain  Midline.  No radiculopathy.  Worse with motion or positioning.  Occasionally makes it difficult to sleep.  Has used tramadol in the past on a very, very, very sparing basis.   Would like short course.        Exam:   MS: Minimal crepitance is noted in the extremities. No deformity is present. Muscle strength is appropriate and equal bilaterally. No acute joint erythema or swelling is present.  Reproducible discomfort with lumbar motion.  No significant paraspinal spasm present at this time.      #3   Gastroesophageal reflux  On Prilosec with good results.  Denies melena or hematochezia        Exam:   GI: Abdomen is soft, without rebound, guarding or tenderness. Bowel sounds are appropriate. No renal bruits are heard.      #4   History of SVT  No recent recurrence.  Denies syncope or near syncope.        Exam:   HEART:  regular  "without rubs, clicks, gallops, or murmurs. PMI is nondisplaced. Upstrokes are brisk. S1,S2 are heard.   LUNGS: clear bilaterally, airflow is brisk, no intercostal retraction or stridor is noted. No coughing is noted during visit.        Patient has been interviewed, applicable history and applied review of systems have been performed.    Vital Signs:   /76   Pulse 82   Temp 98  F (36.7  C) (Temporal)   Resp 16   Ht 1.613 m (5' 3.5\")   Wt 73 kg (161 lb)   SpO2 95%   BMI 28.07 kg/m        Decision Making    Problem and Complexity     1. Type 2 diabetes mellitus with diabetic polyneuropathy, without long-term current use of insulin (H)  Recommend discontinuing in the glimepiride and continue Metformin alone.    2. SVT (supraventricular tachycardia) (H)  Stable.  Continue current medication    3. Acute midline low back pain without sciatica  We will give the patient a short course of tramadol to have on hand.  She is will use on a very rare basis.  Will use Tylenol preferentially.  - traMADol (ULTRAM) 50 MG tablet; Take 1 tablet (50 mg) by mouth every 6 hours as needed for severe pain  Dispense: 20 tablet; Refill: 0    4. Gastroesophageal reflux disease without esophagitis  Avoid NSAIDs.  Will change Prilosec to Protonix to avoid potential interactions with other medications.  - pantoprazole (PROTONIX) 40 MG EC tablet; Take 1 tablet (40 mg) by mouth daily  Dispense: 90 tablet; Refill: 0                                FOLLOW UP   I have asked the patient to make an appointment for followup with me in 4 months or as needed        I have carefully explained the diagnosis and treatment options to the patient.  The patient has displayed an understanding of the above, and all subsequent questions were answered.      DO JT Thompson    Portions of this note were produced using [employer]  Although every attempt at real-time proof reading has been made, occasional grammar/syntax errors may have " been missed.

## 2022-03-01 NOTE — TELEPHONE ENCOUNTER
Test strips  Prescription approved per [facility] Refill Protocol.    Thao Subramanian RN

## 2022-03-02 ENCOUNTER — APPOINTMENT (OUTPATIENT)
Dept: CT IMAGING | Facility: CLINIC | Age: 82
End: 2022-03-02
Attending: EMERGENCY MEDICINE
Payer: MEDICARE

## 2022-03-02 ENCOUNTER — HOSPITAL ENCOUNTER (EMERGENCY)
Facility: CLINIC | Age: 82
Discharge: HOME | End: 2022-03-02
Attending: EMERGENCY MEDICINE | Admitting: EMERGENCY MEDICINE
Payer: MEDICARE

## 2022-03-02 VITALS
HEART RATE: 79 BPM | OXYGEN SATURATION: 99 % | TEMPERATURE: 97 F | BODY MASS INDEX: 28.53 KG/M2 | WEIGHT: 161 LBS | DIASTOLIC BLOOD PRESSURE: 84 MMHG | HEIGHT: 63 IN | SYSTOLIC BLOOD PRESSURE: 157 MMHG | RESPIRATION RATE: 18 BRPM

## 2022-03-02 DIAGNOSIS — S16.1XXA NECK STRAIN, INITIAL ENCOUNTER: ICD-10-CM

## 2022-03-02 DIAGNOSIS — S00.03XA CONTUSION OF SCALP, INITIAL ENCOUNTER: ICD-10-CM
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
## 2022-03-02 LAB
ATRIAL RATE - MUSE: 75 BPM
DIASTOLIC BLOOD PRESSURE - MUSE: NORMAL MMHG
INTERPRETATION ECG - MUSE: NORMAL
P AXIS - MUSE: 50 DEGREES
PR INTERVAL - MUSE: 162 MS
QRS DURATION - MUSE: 78 MS
QT - MUSE: 408 MS
QTC - MUSE: 455 MS
R AXIS - MUSE: -7 DEGREES
SYSTOLIC BLOOD PRESSURE - MUSE: NORMAL MMHG
T AXIS - MUSE: 2 DEGREES
VENTRICULAR RATE- MUSE: 75 BPM

## 2022-03-02 PROCEDURE — 70450 CT HEAD/BRAIN W/O DYE: CPT

## 2022-03-02 PROCEDURE — 72125 CT NECK SPINE W/O DYE: CPT

## 2022-03-02 PROCEDURE — 93005 ELECTROCARDIOGRAM TRACING: CPT

## 2022-03-02 PROCEDURE — 99285 EMERGENCY DEPT VISIT HI MDM: CPT | Mod: 25

## 2022-03-02 ASSESSMENT — ENCOUNTER SYMPTOMS
NAUSEA: 1
WEAKNESS: 0
ARTHRALGIAS: 0
NECK PAIN: 1
BACK PAIN: 0
ABDOMINAL PAIN: 0
HEADACHES: 1

## 2022-03-03 NOTE — ED PROVIDER NOTES
History   Chief Complaint:  Fall and Loss of Consciousness       The history is provided by the patient and the EMS personnel.      Charlotte Snow is a 81 year old female on Plavix with history of TIA, type 2 diabetes, osteoarthritis, hyperlipidemia, and cerebral infarction who presents with fall and loss of consciousness. Per EMS, the patient was at the airport heading to her sister's  in [place] when she tripped backwards on a low curb, falling and hitting the back of her head on concrete. Her family at the scene reportedly endorsed less than 30 seconds of syncope. EMS notes nausea en route, for which the patient received 4 mg of Zofran, which helped. They report a systolic blood pressure in the 190s en route, now down to the 150s, with a blood glucose level of 221. At this time, the patient reports pain and swelling to her right posterior scalp. She also notes neck pain. She states that she experienced a transient tingling sensation to her hands and feet after her fall, which has reportedly resolved. Of note, the patient endorses a fractured right hip and right knee, for which she is reportedly unable to undergo surgical intervention due to their severity. EMS states that the patient was recently taken off glipizide due to low blood sugar. The patient denies alcohol use. She notes a recent Covid-19 infection on 22, about 2 months ago. She reportedly recovered fully and was not hospitalized. She also endorses being vaccinated and boosted against Covid-19. At this time, the patient denies pain to her back, chest, abdomen, arms, or legs. She also reports no weakness, visual disturbances, or dental concerns.     Review of Systems   HENT: Negative for dental problem.    Eyes: Negative for visual disturbance.   Cardiovascular: Negative for chest pain.   Gastrointestinal: Positive for nausea. Negative for abdominal pain.   Musculoskeletal: Positive for neck pain. Negative for arthralgias (arms, legs)  "and back pain.   Neurological: Positive for syncope and headaches. Negative for weakness.   All other systems reviewed and are negative.      Allergies:  Ace Inhibitors  Codeine  Losartan Potassium  Sulfa Drugs    Medications:  Atorvastatin  Biotin  Calcium Carbonate-Vitamin D  Plavix  Gabapentin  Metformin  Glucosamine Chondroitin  Pantoprazole  Tramadol    Past Medical History:     Cerebral infarction  Chronic kidney disease   Diabetic polyneuropathy   Excessive or frequent menstruation  Hyperlipidemia   Measles  Microalbuminuria   Migraine   Mumps  Osteoarthritis    Shingles   Somatic lumbar dysfunction  Supraventricular tachycardia   TIA  Toxic effect of toluene   Type 2 diabetes    Past Surgical History:    Colonoscopy x4  Colonoscopy with brush/wash  Esophagogastroduodenoscopy  Tonsillectomy  Hammertoe repair  Laparoscopic cholecystectomy  Lumbar laminectomy   Hip ORIF  Tibial plateau ORIF  Left wrist ORIF  Bilateral cataract surgery  Appendectomy  RUSTAM    Family History:    Mother: Abdominal cancer, diabetes, colon cancer  Father: Arterial hardening  Sister: Hypertension, colon cancer, thyroid disease, hypertension    Social History:  Presents to the emergency department alone   Arrives via ambulance  Patient is   Patient's sister recently passed away from Covid-19    Physical Exam     Patient Vitals for the past 24 hrs:   BP Temp Temp src Pulse Resp SpO2 Height Weight   03/02/22 2039 (!) 169/88 97  F (36.1  C) Oral 82 16 95 % 1.6 m (5' 3\") 73 kg (161 lb)     Physical Exam  SKIN:  Warm, dry.  HEMATOLOGIC/IMMUNOLOGIC/LYMPHATIC:  No pallor.  HENT: Arrived with a cervical collar appropriately applied.  Right posterior lateral scalp contusion with overlying abrasion.  No associated laceration.  Cervical spine palpably tender.  No facial trauma.  No oral mucosal, lingual or dental trauma.  EYES:  Conjunctivae normal.  CARDIOVASCULAR:  Regular rate and rhythm.  No murmur.  RESPIRATORY:  No respiratory " distress, breath sounds equal and normal.  GASTROINTESTINAL:  Soft, nontender abdomen.  MUSCULOSKELETAL: Painless passive range of motion of the extremities.  NEUROLOGIC:  Alert, conversant.  GCS 15.  Oriented self place and time.  No aphasia.  No dysarthria.  No facial or extremity motor deficit.  No tactile sensory deficit.  No limb ataxia.  PSYCHIATRIC:  Normal mood.    Emergency Department Course   ECG  ECG obtained at 2038, ECG read at 2101  Normal sinus rhythm  Septal infarct, age undetermined   No significant change as compared to prior, dated 09/29/19.  Rate 75 bpm. AR interval 162 ms. QRS duration 78 ms. QT/QTc 408/455 ms. P-R-T axes 50 -7 2.     Imaging:  Head CT w/o contrast   Final Result   IMPRESSION:   1.  No CT evidence for acute intracranial process.   2.  Brain atrophy and presumed chronic microvascular ischemic changes as above.   3.  Large scalp hematoma without fracture.      Cervical spine CT w/o contrast   Final Result   IMPRESSION:   1.  Cervical spondylosis.      2.  Negative for fracture or traumatic malalignment        Report per radiology    Emergency Department Course:     Reviewed:  I reviewed nursing notes, vitals, past medical history and Care Everywhere    Assessments:  2034 I obtained history and examined the patient as noted above.   2140 I rechecked and updated the patient.  2143 I again rechecked the patient and explained findings.     Disposition:  The patient was discharged to home.     Impression & Plan     Medical Decision Making:  This patient suffered a fall from a standing height.  Mechanical fall.  Brief loss of consciousness.  Given her head trauma and as she takes Plavix intracranial pathology such as bleeding was the main concern.  Also suffers neck pain since the fall.  Imaging performed as above which gladly was reassuring.  EKG also reassuring.  She consistently improved over time eventually ambulated under her own power and felt well and doing so and well enough to  go home.  Precautionary instructions were provided regarding her injuries and otherwise advised Tylenol and ice for pain control.    Diagnosis:    ICD-10-CM    1. Contusion of scalp, initial encounter  S00.03XA    2. Neck strain, initial encounter  S16.1XXA    3. Fall, initial encounter  W19.XXXA        Discharge Medications:  New Prescriptions    No medications on file       Scribe Disclosure:  I, Eliseo Negron, am serving as a scribe at 8:46 PM on 3/2/2022 to document services personally performed by Sixto Penn MD based on my observations and the provider's statements to me.       Sixto Penn MD  03/02/22 [id number]

## 2022-03-03 NOTE — ED NOTES
Bed: ED05  Expected date: 3/2/22  Expected time: 8:30 PM  Means of arrival: Ambulance  Comments:  Nathalia 531 81F fall from standing; loss of conc; on thinners

## 2022-03-03 NOTE — ED TRIAGE NOTES
[place] the [place] Pt tripped on the curb and fell backwards and hit her head. Family witnessed fall and reports +LOC for 30 sec. Pt c/o nausea. Pt on blood thinners. Pt brought in with c-collar in place

## 2022-03-07 ENCOUNTER — HOSPITAL ENCOUNTER (EMERGENCY)
Facility: CLINIC | Age: 82
Discharge: HOME | End: 2022-03-07
Attending: PHYSICIAN ASSISTANT | Admitting: PHYSICIAN ASSISTANT
Payer: MEDICARE

## 2022-03-07 ENCOUNTER — NURSE TRIAGE (OUTPATIENT)
Dept: INTERNAL MEDICINE | Facility: CLINIC | Age: 82
End: 2022-03-07
Payer: COMMERCIAL

## 2022-03-07 ENCOUNTER — APPOINTMENT (OUTPATIENT)
Dept: CT IMAGING | Facility: CLINIC | Age: 82
End: 2022-03-07
Attending: PHYSICIAN ASSISTANT
Payer: MEDICARE

## 2022-03-07 VITALS
DIASTOLIC BLOOD PRESSURE: 70 MMHG | SYSTOLIC BLOOD PRESSURE: 138 MMHG | BODY MASS INDEX: 28.52 KG/M2 | WEIGHT: 161 LBS | RESPIRATION RATE: 16 BRPM | TEMPERATURE: 98.4 F | HEART RATE: 70 BPM | OXYGEN SATURATION: 99 %

## 2022-03-07 DIAGNOSIS — S06.0X9A CONCUSSION WITH LOSS OF CONSCIOUSNESS, INITIAL ENCOUNTER: ICD-10-CM

## 2022-03-07 PROCEDURE — 70450 CT HEAD/BRAIN W/O DYE: CPT

## 2022-03-07 PROCEDURE — 99284 EMERGENCY DEPT VISIT MOD MDM: CPT | Mod: 25 | Performed by: PHYSICIAN ASSISTANT

## 2022-03-07 PROCEDURE — 99284 EMERGENCY DEPT VISIT MOD MDM: CPT | Performed by: PHYSICIAN ASSISTANT

## 2022-03-08 ENCOUNTER — PATIENT OUTREACH (OUTPATIENT)
Dept: CARE COORDINATION | Facility: CLINIC | Age: 82
End: 2022-03-08
Payer: COMMERCIAL

## 2022-03-08 DIAGNOSIS — Z71.89 OTHER SPECIFIED COUNSELING: ICD-10-CM

## 2022-03-08 NOTE — TELEPHONE ENCOUNTER
Patient and daughter calling. Patient fell last Wednesday and hit her head. Was seen in [facility] ED. Today patient is complaining of more pain. She tried her Tramadol today for the first today and reports it did not help much. She has a hard time answering where the pain is exactly. Also had trouble answering questions. She also reports dizziness that is new.     With new symptoms after a head injury, it was advised patient be seen in the ED. Daughter said she will take her to the ED tonight.     ANGY Montana, RN  [facility]      Reason for Disposition    Sounds like a serious injury to the triager    Additional Information    Negative: ACUTE NEUROLOGIC SYMPTOM and symptom present now    Negative: Knocked out (unconscious) > 1 minute    Negative: Seizure (convulsion) occurred (Exception: prior history of seizures and now alert and without Acute Neurologic Symptoms)    Negative: Neck pain after dangerous injury (e.g., MVA, diving, trampoline, contact sports, fall > 10 feet or 3 meters) (Exception: neck pain began > 1 hour after injury)    Negative: Major bleeding (actively dripping or spurting) that can't be stopped    Negative: Penetrating head injury (e.g., knife, gunshot wound, metal object)    Negative: Sounds like a life-threatening emergency to the triager    Negative: Recently examined and diagnosed with a concussion by a healthcare provider and has questions about concussion symptoms    Negative: Can't remember what happened (amnesia)    Negative: Vomiting once or more    Negative: Watery or blood-tinged fluid dripping from the nose or ears    Protocols used: HEAD INJURY-A-OH

## 2022-03-08 NOTE — PROGRESS NOTES
"Clinic Care Coordination Contact  [facility]: Post-Discharge Note  SITUATION                                                      Admission:    Admission Date: 22   Reason for Admission: Dizziness  Discharge:   Discharge Date: 22  Discharge Diagnosis: Concussion with loss of consciousness    BACKGROUND                                                      Per hospital discharge summary and inpatient provider notes:    Charlotte Snow is a 81 year old female with a history of type 2 diabetes, chronic kidney disease, CVA on Plavix, who presents to the emergency department complaining of dizziness.  5 days ago on 3/2 the patient suffered a fall with loss of consciousness.  She was seen in the emergency department at [facility] and had a normal head CT and neck CT.  She was discharged that day and on 3/3 she flew in the morning to [place] for her sister's .  She got back on 3/5.  During that trip she had a throbbing in the back of her head where she had a big bruise from falling but otherwise felt okay.  Yesterday on 3/6 however she woke up feeling dizzy like a spinning sensation and had to stay home from [facility].  She stayed in bed most of the day yesterday.  She was having a headache different than her previous where she hit her head but rather in the front of her head and in the base of her skull described as a \"throbbing.\"  She had intermittent episodes of feeling unsteady on her feet with this frontal and posterior throbbing headache throughout the day today as well.  She stayed in bed most of the day due to not feeling well.  Around 4 PM this afternoon when she checked her mail she had received a prescription of tramadol she had requested for her hip and knee pain issues.  She took one tablet and within an hour her headache was gone. She still has soreness where the bruising is but no further dizziness or throbbing sensation.  The patient has not had any visual changes, " "nausea/vomiting, numbness or weakness in extremities.  Currently she reports feeling a lot better.     ASSESSMENT      Enrollment  Primary Care Care Coordination Status: Declined    Discharge Assessment  How are you doing now that you are home?: \"I slept fairly well last night and used an ice pack and I feel okay\"  How are your symptoms? (Red Flag symptoms escalate to triage hotline per guidelines): Improved  Do you feel your condition is stable enough to be safe at home until your provider visit?: Yes  Does the patient have their discharge instructions? : Yes  Does the patient have questions regarding their discharge instructions? : No  Were you started on any new medications or were there changes to any of your previous medications? : No  Does the patient have all of their medications?: Yes  Do you have questions regarding any of your medications? : No  Do you have all of your needed medical supplies or equipment (DME)?  (i.e. oxygen tank, CPAP, cane, etc.): Yes  Discharge follow-up appointment scheduled within 14 calendar days? : Yes  Discharge Follow Up Appointment Date: 03/17/22  Discharge Follow Up Appointment Scheduled with?: Primary Care Provider    Post-op ([facility] CTA Only)  If the patient had a surgery or procedure, do they have any questions for a nurse?: No    PLAN                                                      Outpatient Plan:      1 Follow up with [facility] Emergency Dept (EMERGENCY MEDICINE); If symptoms worsen  2 Follow up with Aron Villa DO (Internal Medicine) in 1 week (3/14/2022); For ER follow up    Future Appointments   Date Time Provider Department Center   3/17/2022  9:40 AM Aron Villa DO [facility]     For any urgent concerns, please contact our 24 hour nurse triage line: [phone])       TANIA Cantrell  [phone]  [facility]  [facility]                  "
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
## 2022-03-08 NOTE — ED TRIAGE NOTES
Pt fell on 3/2/2022, was seen at [facility].  Cleared to fly out of town for planned travel.  Flew back on Saturday. Sunday evening pt developed increased HA, dizziness and fatigue.   Is on Plavix.

## 2022-03-08 NOTE — DISCHARGE INSTRUCTIONS
I think your symptoms are due to a concussion.  This is not seen on a CT scan, it is diagnosed clinically based on your symptoms.  It is extremely important you rest over the next week.  You can try to do different activities that are light but if you develop a headache or dizziness you need to back off.  You can take Tylenol or your tramadol as needed for headache.    Please follow-up with your clinic provider next week for reassessment in regards to your concussion.    If you develop any worsening symptoms please do not hesitate to return to the emergency department.    Thank you for choosing [facility]'s Emergency Department. It was a pleasure taking care of you today. If you have any questions, please call [phone].    Angelina Munoz PA-C

## 2022-03-16 DIAGNOSIS — E11.42 TYPE 2 DIABETES MELLITUS WITH DIABETIC POLYNEUROPATHY, WITHOUT LONG-TERM CURRENT USE OF INSULIN (H): ICD-10-CM

## 2022-03-17 ENCOUNTER — OFFICE VISIT (OUTPATIENT)
Dept: INTERNAL MEDICINE | Facility: CLINIC | Age: 82
End: 2022-03-17
Payer: COMMERCIAL

## 2022-03-17 VITALS
SYSTOLIC BLOOD PRESSURE: 122 MMHG | HEART RATE: 72 BPM | BODY MASS INDEX: 28.66 KG/M2 | RESPIRATION RATE: 18 BRPM | OXYGEN SATURATION: 97 % | DIASTOLIC BLOOD PRESSURE: 72 MMHG | TEMPERATURE: 96.7 F | WEIGHT: 161.8 LBS

## 2022-03-17 DIAGNOSIS — E11.42 TYPE 2 DIABETES MELLITUS WITH DIABETIC POLYNEUROPATHY, WITHOUT LONG-TERM CURRENT USE OF INSULIN (H): ICD-10-CM

## 2022-03-17 DIAGNOSIS — S06.0X9D CONCUSSION WITH LOSS OF CONSCIOUSNESS, SUBSEQUENT ENCOUNTER: Primary | ICD-10-CM

## 2022-03-17 PROCEDURE — 90715 TDAP VACCINE 7 YRS/> IM: CPT | Performed by: INTERNAL MEDICINE

## 2022-03-17 PROCEDURE — 90662 IIV NO PRSV INCREASED AG IM: CPT | Performed by: INTERNAL MEDICINE

## 2022-03-17 PROCEDURE — 90471 IMMUNIZATION ADMIN: CPT | Performed by: INTERNAL MEDICINE

## 2022-03-17 PROCEDURE — 99213 OFFICE O/P EST LOW 20 MIN: CPT | Mod: 25 | Performed by: INTERNAL MEDICINE

## 2022-03-17 PROCEDURE — G0008 ADMIN INFLUENZA VIRUS VAC: HCPCS | Mod: 59 | Performed by: INTERNAL MEDICINE

## 2022-03-17 RX ORDER — METFORMIN HYDROCHLORIDE 750 MG/1
TABLET, EXTENDED RELEASE ORAL
Qty: 90 TABLET | Refills: 0 | Status: SHIPPED | OUTPATIENT
Start: 2022-03-17 | End: 2022-05-25

## 2022-03-17 ASSESSMENT — PAIN SCALES - GENERAL: PAINLEVEL: SEVERE PAIN (6)

## 2022-03-17 NOTE — PROGRESS NOTES
"      Charleen Porter is a 81 year old who presents for the following health issues     HPI     Post Discharge Outreach 3/8/2022   Admission Date 3/7/2022   Reason for Admission Dizziness   Discharge Date 3/7/2022   Discharge Diagnosis Concussion with loss of consciousness   How are you doing now that you are home? \"I slept fairly well last night and used an ice pack and I feel okay\"   How are your symptoms? (Red Flag symptoms escalate to triage hotline per guidelines) Improved   Do you feel your condition is stable enough to be safe at home until your provider visit? Yes   Does the patient have their discharge instructions?  Yes   Does the patient have questions regarding their discharge instructions?  No   Were you started on any new medications or were there changes to any of your previous medications?  No   Does the patient have all of their medications? Yes   Do you have questions regarding any of your medications?  No   Do you have all of your needed medical supplies or equipment (DME)?  (i.e. oxygen tank, CPAP, cane, etc.) Yes   Discharge follow-up appointment scheduled within 14 calendar days?  Yes   Discharge Follow Up Appointment Date 3/17/2022   Discharge Follow Up Appointment Scheduled with? Primary Care Provider          EMR reviewed including:             Complaint, History of Chief Complaint, Corresponding Review of Systems, and Complaint Specific Physical Examination.    #1   ER follow-up  Fell and hit back of head on March 2  Work-up noted and unremarkable.  Began developing increased headaches on March 7 and presented to emergency department again.  Radiographs demonstrated no bleed or trauma.  Patient feeling much better now.   Does have rare headache, rare dizziness.        Exam:   HEART:  regular without rubs, clicks, gallops, or murmurs. PMI is nondisplaced. Upstrokes are brisk. S1,S2 are heard.   LUNGS: clear bilaterally, airflow is brisk, no intercostal retraction or stridor is noted. No " coughing is noted during visit.   NEURO: Pt is alert and appropriate. No neurologic lateralization is noted. Cranial nerves 2-12 are intact. Peripheral sensory and motor function are grossly normal.    MS: Minimal crepitance is noted in the extremities. No deformity is present. Muscle strength is appropriate and equal bilaterally. No acute joint erythema or swelling is present.  A small residual hematoma is noted on the back of her scalp in the occipital area toward the right.      #2   Follow-up on type 2 diabetes  Blood sugars generally under 150.  No longer having hypoglycemic spells.  Tolerating Metformin alone.  Watching diet closely.  On statin, not on ACE inhibitor or ARB due to hypertensive response.  Not on aspirin due to Plavix use  Denies polyuria or polydipsia.  Denies acute vision changes.  Denies a symptoms of hypoglycemia.      Patient has been interviewed, applicable history and applied review of systems have been performed.    Vital Signs:   /72 (BP Location: Right arm, Patient Position: Chair, Cuff Size: Adult Large)   Pulse 72   Temp (!) 96.7  F (35.9  C) (Temporal)   Resp 18   Wt 73.4 kg (161 lb 12.8 oz)   SpO2 97%   BMI 28.66 kg/m        Decision Making    Problem and Complexity     1. Concussion with loss of consciousness, subsequent encounter  Recommend continued observation.  Overall doing well    2. Type 2 diabetes mellitus with diabetic polyneuropathy, without long-term current use of insulin (H)  Recommend optometric examination.  We will continue Metformin.  Recommend continue Plavix, no aspirin.  - OPTOMETRY REFERRAL; Future  - metFORMIN (GLUCOPHAGE-XR) 750 MG 24 hr tablet; TAKE 1 TABLET EVERY DAY WITH DINNER  Dispense: 90 tablet; Refill: 0                                FOLLOW UP   I have asked the patient to make an appointment for followup with me in 2 months for diabetic follow-up and lab work        I have carefully explained the diagnosis and treatment options to the  patient.  The patient has displayed an understanding of the above, and all subsequent questions were answered.      DO JT Thompson    Portions of this note were produced using [employer]  Although every attempt at real-time proof reading has been made, occasional grammar/syntax errors may have been missed.

## 2022-03-17 NOTE — NURSING NOTE
Prior to immunization administration, verified patients identity using patient s name and date of birth. Please see Immunization Activity for additional information.     Screening Questionnaire for Adult Immunization    Are you sick today?   No   Do you have allergies to medications, food, a vaccine component or latex?   Yes   Have you ever had a serious reaction after receiving a vaccination?   No   Do you have a long-term health problem with heart, lung, kidney, or metabolic disease (e.g., diabetes), asthma, a blood disorder, no spleen, complement component deficiency, a cochlear implant, or a spinal fluid leak?  Are you on long-term aspirin therapy?   No   Do you have cancer, leukemia, HIV/AIDS, or any other immune system problem?   No   Do you have a parent, brother, or sister with an immune system problem?   No   In the past 3 months, have you taken medications that affect  your immune system, such as prednisone, other steroids, or anticancer drugs; drugs for the treatment of rheumatoid arthritis, Crohn s disease, or psoriasis; or have you had radiation treatments?   No   Have you had a seizure, or a brain or other nervous system problem?   No   During the past year, have you received a transfusion of blood or blood    products, or been given immune (gamma) globulin or antiviral drug?   No   For women: Are you pregnant or is there a chance you could become       pregnant during the next month?   No   Have you received any vaccinations in the past 4 weeks?   No     Immunization questionnaire was positive for at least one answer.  Notified Dr. Villa.        Per orders of  , injection of  given by Carmen Pope LPN. Patient instructed to remain in clinic for 15 minutes afterwards, and to report any adverse reaction to me immediately.       Screening performed by Carmen Pope LPN on 3/17/2022 at 10:12 AM.

## 2022-03-17 NOTE — NURSING NOTE
Health Maintenance Due   Topic Date Due     ANNUAL REVIEW OF HM ORDERS  Never done     ZOSTER IMMUNIZATION (2 of 3) 02/06/2014     DTAP/TDAP/TD IMMUNIZATION (3 - Td or Tdap) 04/20/2019     MEDICARE ANNUAL WELLNESS VISIT  10/21/2020     DIABETIC FOOT EXAM  11/14/2020     INFLUENZA VACCINE (1) 09/01/2021     EYE EXAM  11/02/2021     FALL RISK ASSESSMENT  01/14/2022     Carmen DENSON LPN

## 2022-03-18 RX ORDER — METFORMIN HYDROCHLORIDE 750 MG/1
TABLET, EXTENDED RELEASE ORAL
Qty: 90 TABLET | Refills: 0 | OUTPATIENT
Start: 2022-03-18

## 2022-04-06 ENCOUNTER — TELEPHONE (OUTPATIENT)
Dept: INTERNAL MEDICINE | Facility: CLINIC | Age: 82
End: 2022-04-06
Payer: COMMERCIAL

## 2022-04-06 DIAGNOSIS — R30.0 DYSURIA: Primary | ICD-10-CM

## 2022-04-06 NOTE — TELEPHONE ENCOUNTER
Reason for Call:  Other call back    Detailed comments: patient called stating she is having burning while urinating again and is wondering if she can just come in for a UA. Then she also stated if it does come back positive and she is put on an antibiotic she almost always gets a yeast infection so she would need something for that as well.     Phone Number Patient can be reached at: Home number on file [phone] (home)    Best Time: any    Can we leave a detailed message on this number? YES    Call taken on 4/6/2022 at 2:35 PM by Mariya Parmar

## 2022-04-07 ENCOUNTER — LAB (OUTPATIENT)
Dept: LAB | Facility: CLINIC | Age: 82
End: 2022-04-07
Payer: COMMERCIAL

## 2022-04-07 DIAGNOSIS — R30.0 DYSURIA: ICD-10-CM

## 2022-04-07 LAB
ALBUMIN UR-MCNC: 100 MG/DL
APPEARANCE UR: ABNORMAL
BACTERIA #/AREA URNS HPF: ABNORMAL /HPF
BILIRUB UR QL STRIP: NEGATIVE
COLOR UR AUTO: YELLOW
GLUCOSE UR STRIP-MCNC: NEGATIVE MG/DL
HGB UR QL STRIP: NEGATIVE
KETONES UR STRIP-MCNC: NEGATIVE MG/DL
LEUKOCYTE ESTERASE UR QL STRIP: ABNORMAL
MUCOUS THREADS #/AREA URNS LPF: PRESENT /LPF
NITRATE UR QL: NEGATIVE
PH UR STRIP: 5 [PH] (ref 5–7)
RBC URINE: 0 /HPF
SP GR UR STRIP: 1.02 (ref 1–1.03)
UROBILINOGEN UR STRIP-MCNC: NORMAL MG/DL
WBC CLUMPS #/AREA URNS HPF: PRESENT /HPF
WBC URINE: >182 /HPF

## 2022-04-07 PROCEDURE — 87086 URINE CULTURE/COLONY COUNT: CPT

## 2022-04-07 PROCEDURE — 81001 URINALYSIS AUTO W/SCOPE: CPT

## 2022-04-07 PROCEDURE — 87186 SC STD MICRODIL/AGAR DIL: CPT

## 2022-04-08 ENCOUNTER — PATIENT OUTREACH (OUTPATIENT)
Dept: CARE COORDINATION | Facility: CLINIC | Age: 82
End: 2022-04-08
Payer: COMMERCIAL

## 2022-04-08 DIAGNOSIS — N30.90 CYSTITIS WITHOUT HEMATURIA: ICD-10-CM

## 2022-04-08 DIAGNOSIS — R19.7 DIARRHEA OF PRESUMED INFECTIOUS ORIGIN: Primary | ICD-10-CM

## 2022-04-08 DIAGNOSIS — B37.31 YEAST VAGINITIS: ICD-10-CM

## 2022-04-08 NOTE — PROGRESS NOTES
Clinic Care Coordination Contact  Care Team Conversations    Patient called and left a message on voicemail that she is looking for results of her UA from yesterday she is also wondering if she potentially could have C. difficile from all the antibiotics she has been on    Please call patient with results    Thank you        Linda TRAVIS, RN, PHN, CCM  Primary Clinic Care Coordination    [facility]  [email or web address]   Office: [phone]  Employed by [facility]

## 2022-04-09 LAB — BACTERIA UR CULT: ABNORMAL

## 2022-04-11 RX ORDER — NITROFURANTOIN 25; 75 MG/1; MG/1
100 CAPSULE ORAL 2 TIMES DAILY
Qty: 14 CAPSULE | Refills: 0 | Status: SHIPPED | OUTPATIENT
Start: 2022-04-11 | End: 2022-04-21

## 2022-04-11 RX ORDER — FLUCONAZOLE 150 MG/1
TABLET ORAL
Qty: 4 TABLET | Refills: 0 | Status: SHIPPED | OUTPATIENT
Start: 2022-04-11 | End: 2022-04-21

## 2022-04-11 NOTE — TELEPHONE ENCOUNTER
Urinary analysis and culture are now available.  The patient does have infection with E. coli.    Nitrofurantoin has been called to the [facility] pharmacy.    She should recheck the urine in 1 week.    If she continues to have diarrhea, Clostridium testing will be recommended.

## 2022-04-11 NOTE — TELEPHONE ENCOUNTER
I have contacted the pt with the message below. She is wondering if she can also get something something for itching. She said she usually gets itching after starting the antibiotic. She would like it sent to [facility] in [place]. Ratna Deleon, [facility]

## 2022-04-12 DIAGNOSIS — E11.42 TYPE 2 DIABETES MELLITUS WITH DIABETIC POLYNEUROPATHY, WITHOUT LONG-TERM CURRENT USE OF INSULIN (H): ICD-10-CM

## 2022-04-14 RX ORDER — CALCIUM CITRATE/VITAMIN D3 200MG-6.25
TABLET ORAL
Qty: 200 STRIP | Refills: 3 | OUTPATIENT
Start: 2022-04-14

## 2022-04-21 ENCOUNTER — HOSPITAL ENCOUNTER (OUTPATIENT)
Dept: GENERAL RADIOLOGY | Facility: CLINIC | Age: 82
Discharge: HOME | End: 2022-04-21
Attending: STUDENT IN AN ORGANIZED HEALTH CARE EDUCATION/TRAINING PROGRAM | Admitting: STUDENT IN AN ORGANIZED HEALTH CARE EDUCATION/TRAINING PROGRAM
Payer: MEDICARE

## 2022-04-21 ENCOUNTER — OFFICE VISIT (OUTPATIENT)
Dept: FAMILY MEDICINE | Facility: CLINIC | Age: 82
End: 2022-04-21
Payer: COMMERCIAL

## 2022-04-21 VITALS
TEMPERATURE: 98.1 F | HEART RATE: 96 BPM | RESPIRATION RATE: 20 BRPM | SYSTOLIC BLOOD PRESSURE: 134 MMHG | OXYGEN SATURATION: 96 % | WEIGHT: 161 LBS | DIASTOLIC BLOOD PRESSURE: 66 MMHG | BODY MASS INDEX: 28.52 KG/M2

## 2022-04-21 DIAGNOSIS — B37.31 YEAST VAGINITIS: ICD-10-CM

## 2022-04-21 DIAGNOSIS — N39.0 ACUTE UTI (URINARY TRACT INFECTION): ICD-10-CM

## 2022-04-21 DIAGNOSIS — S99.921A FOOT INJURY, RIGHT, INITIAL ENCOUNTER: Primary | ICD-10-CM

## 2022-04-21 DIAGNOSIS — S99.921A FOOT INJURY, RIGHT, INITIAL ENCOUNTER: ICD-10-CM

## 2022-04-21 PROCEDURE — 99213 OFFICE O/P EST LOW 20 MIN: CPT | Performed by: STUDENT IN AN ORGANIZED HEALTH CARE EDUCATION/TRAINING PROGRAM

## 2022-04-21 PROCEDURE — 73630 X-RAY EXAM OF FOOT: CPT | Mod: RT

## 2022-04-21 RX ORDER — FLUCONAZOLE 150 MG/1
TABLET ORAL
Qty: 4 TABLET | Refills: 0 | Status: SHIPPED | OUTPATIENT
Start: 2022-04-21 | End: 2023-03-27

## 2022-04-21 ASSESSMENT — PAIN SCALES - GENERAL: PAINLEVEL: MODERATE PAIN (5)

## 2022-04-21 NOTE — PROGRESS NOTES
"  Assessment & Plan     Foot injury, right, initial encounter  Symptoms do seem consistent with medial ankle sprain.  Patient requesting x-ray today which was done and negative.  We will plan for them continue to ice at home and start rehab exercises as pain allows.  Also discussed Planter fasciitis exercises that she can do if this continues to be a problem.  - XR Foot Right G/E 3 Views    Yeast vaginitis  Recent UTI now has yeast infection which is common for her.  It does look like her PCP wanted a repeat UA which was ordered today.  UTI symptoms resolved.  - fluconazole (DIFLUCAN) 150 MG tablet  Dispense: 4 tablet; Refill: 0         BMI:   Estimated body mass index is 28.52 kg/m  as calculated from the following:    Height as of 3/2/22: 1.6 m (5' 3\").    Weight as of this encounter: 73 kg (161 lb).     Return if symptoms worsen or fail to improve.    Aiden Mcclure MD  [facility] EDWARD Porter is a 81 year old who presents for the following health issues     Foot Injury  This is a new problem. The current episode started in the past 7 days. The symptoms are aggravated by walking. She has tried ice, heat and acetaminophen for the symptoms. The treatment provided moderate relief.      Patient with fall when she tripped on the stairs last Friday.  She did bump her shoulder but that feels good.  She did not hit her head at that time.  Was seen in urgent care [facility] but no notes of this currently.  They do not have the x-ray machine in the area but she would like x-rays today.  Continues to have discomfort but swelling much improved.  No bruising currently.  No previous major issues with her foot.  Thinks she twisted funny when going down the stairs but not sure if she hit it directly on anything.    Review of Systems   Constitutional, HEENT, cardiovascular, pulmonary, gi and gu systems are negative, except as otherwise noted.      Objective    /66 (BP Location: Right arm, " Patient Position: Sitting, Cuff Size: Adult Large)   Pulse 96   Temp 98.1  F (36.7  C) (Temporal)   Resp 20   Wt 73 kg (161 lb)   SpO2 96%   BMI 28.52 kg/m    Body mass index is 28.52 kg/m .  Physical Exam   GENERAL: healthy, alert and no distress  EYES: Eyes grossly normal to inspection, PERRL and conjunctivae and sclerae normal  RESP: Breathing complain room air  CV: Does have some mild medial right ankle swelling  MS: Some mild right plantar fascial tenderness to palpation, right medial joint line tenderness to palpation with no bony malleoli or tenderness navicular or fifth metatarsal tenderness.  No pain with plantar dorsiflexion at the foot.  Some pain with eversion  SKIN: no suspicious lesions or rashes  NEURO: Normal strength and tone, mentation intact and speech normal  PSYCH: mentation appears normal, affect normal/bright

## 2022-04-21 NOTE — PROGRESS NOTES
Appropriate assistive devices provided during their visit.na(Yes, No, N/A) na (list device)    Exam table and/or cart  placed in the lowest position. yes (Yes, No, N/A)    Brakes on tables/carts/wheelchairs used at all times. yes (Yes, No, N/A)    Non slip footwear applied. na (Yes, No, NA)    Patient was accompanied by staff throughout visit. yes (Yes, No, N/A)    Equipment safety straps used. na (Yes, No, N/A)    Assist with toileting. na (Yes, No, N/A)

## 2022-04-26 ENCOUNTER — LAB (OUTPATIENT)
Dept: LAB | Facility: CLINIC | Age: 82
End: 2022-04-26
Payer: COMMERCIAL

## 2022-04-26 DIAGNOSIS — N39.0 ACUTE UTI (URINARY TRACT INFECTION): ICD-10-CM

## 2022-04-26 LAB
ALBUMIN UR-MCNC: 100 MG/DL
APPEARANCE UR: ABNORMAL
BACTERIA #/AREA URNS HPF: ABNORMAL /HPF
BILIRUB UR QL STRIP: NEGATIVE
COLOR UR AUTO: YELLOW
GLUCOSE UR STRIP-MCNC: 50 MG/DL
HGB UR QL STRIP: NEGATIVE
KETONES UR STRIP-MCNC: 5 MG/DL
LEUKOCYTE ESTERASE UR QL STRIP: ABNORMAL
MUCOUS THREADS #/AREA URNS LPF: PRESENT /LPF
NITRATE UR QL: NEGATIVE
PH UR STRIP: 5 [PH] (ref 5–7)
RBC URINE: 18 /HPF
SP GR UR STRIP: 1.02 (ref 1–1.03)
SQUAMOUS EPITHELIAL: 9 /HPF
TRANSITIONAL EPI: 1 /HPF
UROBILINOGEN UR STRIP-MCNC: 2 MG/DL
WBC URINE: >182 /HPF

## 2022-04-26 PROCEDURE — 81001 URINALYSIS AUTO W/SCOPE: CPT

## 2022-04-26 PROCEDURE — 87186 SC STD MICRODIL/AGAR DIL: CPT

## 2022-04-26 PROCEDURE — 87086 URINE CULTURE/COLONY COUNT: CPT

## 2022-04-28 LAB — BACTERIA UR CULT: ABNORMAL

## 2022-05-01 ENCOUNTER — MYC MEDICAL ADVICE (OUTPATIENT)
Dept: INTERNAL MEDICINE | Facility: CLINIC | Age: 82
End: 2022-05-01
Payer: COMMERCIAL

## 2022-05-01 DIAGNOSIS — N30.00 ACUTE CYSTITIS WITHOUT HEMATURIA: ICD-10-CM

## 2022-05-01 DIAGNOSIS — B96.20 E. COLI UTI: Primary | ICD-10-CM

## 2022-05-01 DIAGNOSIS — N39.0 E. COLI UTI: Primary | ICD-10-CM

## 2022-05-02 RX ORDER — CEPHALEXIN 500 MG/1
500 CAPSULE ORAL 2 TIMES DAILY
Qty: 14 CAPSULE | Refills: 0 | Status: SHIPPED | OUTPATIENT
Start: 2022-05-02 | End: 2023-03-27

## 2022-05-03 DIAGNOSIS — B37.31 YEAST VAGINITIS: ICD-10-CM

## 2022-05-04 DIAGNOSIS — K21.9 GASTROESOPHAGEAL REFLUX DISEASE WITHOUT ESOPHAGITIS: ICD-10-CM

## 2022-05-05 RX ORDER — FLUCONAZOLE 150 MG/1
TABLET ORAL
Qty: 4 TABLET | Refills: 0 | OUTPATIENT
Start: 2022-05-05

## 2022-05-05 NOTE — TELEPHONE ENCOUNTER
If patient has continued symptoms she should set up phone visit to discuss further. Maybe unrelated to yeast infection and may need wet prep.

## 2022-05-05 NOTE — TELEPHONE ENCOUNTER
Pending Prescriptions:                       Disp   Refills    fluconazole (DIFLUCAN) 150 MG tablet [Phar*4 tabl*0        Sig: TAKE 1 TABLET BY MOUTH EVERY 3RD DAY FOR 4 DOSES    Routing refill request to provider for review/approval because:  Please advise if you would like patient to continue medication.    Cristal Beltre, BSN, RN

## 2022-05-06 RX ORDER — PANTOPRAZOLE SODIUM 40 MG/1
TABLET, DELAYED RELEASE ORAL
Qty: 90 TABLET | Refills: 1 | Status: SHIPPED | OUTPATIENT
Start: 2022-05-06 | End: 2022-12-14

## 2022-05-18 ENCOUNTER — OFFICE VISIT (OUTPATIENT)
Dept: UROLOGY | Facility: CLINIC | Age: 82
End: 2022-05-18
Payer: COMMERCIAL

## 2022-05-18 VITALS
OXYGEN SATURATION: 96 % | DIASTOLIC BLOOD PRESSURE: 63 MMHG | BODY MASS INDEX: 29.25 KG/M2 | TEMPERATURE: 96.8 F | SYSTOLIC BLOOD PRESSURE: 135 MMHG | WEIGHT: 165.1 LBS | HEART RATE: 71 BPM

## 2022-05-18 DIAGNOSIS — N39.0 RECURRENT UTI: ICD-10-CM

## 2022-05-18 DIAGNOSIS — N81.6 RECTOCELE: Primary | ICD-10-CM

## 2022-05-18 DIAGNOSIS — N95.2 ATROPHIC VAGINITIS: ICD-10-CM

## 2022-05-18 LAB
ALBUMIN UR-MCNC: NEGATIVE MG/DL
APPEARANCE UR: CLEAR
BILIRUB UR QL STRIP: NEGATIVE
COLOR UR AUTO: YELLOW
GLUCOSE UR STRIP-MCNC: NEGATIVE MG/DL
HGB UR QL STRIP: NEGATIVE
KETONES UR STRIP-MCNC: NEGATIVE MG/DL
LEUKOCYTE ESTERASE UR QL STRIP: ABNORMAL
MUCOUS THREADS #/AREA URNS LPF: PRESENT /LPF
NITRATE UR QL: NEGATIVE
PH UR STRIP: 6 [PH] (ref 5–7)
RBC URINE: 3 /HPF
SP GR UR STRIP: 1.01 (ref 1–1.03)
SQUAMOUS EPITHELIAL: 1 /HPF
UROBILINOGEN UR STRIP-MCNC: NORMAL MG/DL
WBC URINE: 12 /HPF

## 2022-05-18 PROCEDURE — 81001 URINALYSIS AUTO W/SCOPE: CPT | Performed by: UROLOGY

## 2022-05-18 PROCEDURE — 99213 OFFICE O/P EST LOW 20 MIN: CPT | Mod: 25 | Performed by: UROLOGY

## 2022-05-18 PROCEDURE — 51798 US URINE CAPACITY MEASURE: CPT | Performed by: UROLOGY

## 2022-05-18 PROCEDURE — 87086 URINE CULTURE/COLONY COUNT: CPT | Performed by: UROLOGY

## 2022-05-18 RX ORDER — ESTRADIOL 0.1 MG/G
1 CREAM VAGINAL
Qty: 42.5 G | Refills: 11 | Status: SHIPPED | OUTPATIENT
Start: 2022-05-19 | End: 2022-07-14

## 2022-05-18 ASSESSMENT — PAIN SCALES - GENERAL: PAINLEVEL: NO PAIN (0)

## 2022-05-18 NOTE — PROGRESS NOTES
Bladder Scan performed. 35 mL maximum residual urine detected after 3 scans. MD informed   Claire Cabrera RN on 5/18/2022 at 9:51 AM

## 2022-05-19 LAB — BACTERIA UR CULT: NO GROWTH

## 2022-05-24 DIAGNOSIS — E11.42 TYPE 2 DIABETES MELLITUS WITH DIABETIC POLYNEUROPATHY, WITHOUT LONG-TERM CURRENT USE OF INSULIN (H): ICD-10-CM

## 2022-05-24 NOTE — TELEPHONE ENCOUNTER
Reason for Call:  Medication or medication refill:    Do you use a [facility]?  Name of the pharmacy and phone number for the current request:  [facility]    Name of the medication requested: metFORMIN (GLUCOPHAGE-XR) 750 MG 24 hr tablet    Other request: Patient only has a weeks worth left and they are mailed to her    Can we leave a detailed message on this number? YES    Phone number patient can be reached at: Home number on file [phone] (home)    Best Time: any    Call taken on 5/24/2022 at 2:46 PM by Mariya Parmar

## 2022-05-25 RX ORDER — METFORMIN HYDROCHLORIDE 750 MG/1
TABLET, EXTENDED RELEASE ORAL
Qty: 90 TABLET | Refills: 0 | OUTPATIENT
Start: 2022-05-25

## 2022-05-25 RX ORDER — METFORMIN HYDROCHLORIDE 750 MG/1
TABLET, EXTENDED RELEASE ORAL
Qty: 90 TABLET | Refills: 0 | Status: SHIPPED | OUTPATIENT
Start: 2022-05-25 | End: 2022-05-31

## 2022-05-31 DIAGNOSIS — E11.42 TYPE 2 DIABETES MELLITUS WITH DIABETIC POLYNEUROPATHY, WITHOUT LONG-TERM CURRENT USE OF INSULIN (H): ICD-10-CM

## 2022-05-31 RX ORDER — METFORMIN HYDROCHLORIDE 750 MG/1
TABLET, EXTENDED RELEASE ORAL
Qty: 90 TABLET | Refills: 0 | Status: SHIPPED | OUTPATIENT
Start: 2022-05-31 | End: 2022-08-16

## 2022-05-31 NOTE — TELEPHONE ENCOUNTER
Pt called her pharmacy and [facility] is just now sending pt's prescription and told her 7-10 days. She only has 1 metformin left for tomorrow and will be out. Can you send over a week or so supply to [facility] to get her by until her mail order comes.
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
## 2022-06-15 DIAGNOSIS — G45.9 TIA (TRANSIENT ISCHEMIC ATTACK): ICD-10-CM

## 2022-06-15 DIAGNOSIS — G62.9 PERIPHERAL POLYNEUROPATHY: ICD-10-CM

## 2022-06-16 NOTE — TELEPHONE ENCOUNTER
Pending Prescriptions:                       Disp   Refills    gabapentin (NEURONTIN) 300 MG capsule [Pha*270 ca*3        Sig: TAKE 1 CAPSULE THREE TIMES DAILY    atorvastatin (LIPITOR) 40 MG tablet [Pharm*90 tab*1        Sig: TAKE 1 TABLET EVERY EVENING    Routing refill request to provider for review/approval because:  Drug not on the [facility] refill protocol   A break in medication

## 2022-06-17 RX ORDER — ATORVASTATIN CALCIUM 40 MG/1
TABLET, FILM COATED ORAL
Qty: 90 TABLET | Refills: 1 | Status: SHIPPED | OUTPATIENT
Start: 2022-06-17 | End: 2023-02-02

## 2022-06-17 RX ORDER — GABAPENTIN 300 MG/1
CAPSULE ORAL
Qty: 270 CAPSULE | Refills: 3 | Status: SHIPPED | OUTPATIENT
Start: 2022-06-17 | End: 2023-08-08

## 2022-06-27 ENCOUNTER — MYC MEDICAL ADVICE (OUTPATIENT)
Dept: INTERNAL MEDICINE | Facility: CLINIC | Age: 82
End: 2022-06-27

## 2022-06-27 DIAGNOSIS — M79.661 PAIN OF RIGHT LOWER LEG: Primary | ICD-10-CM

## 2022-06-30 NOTE — PATIENT INSTRUCTIONS
If you have labs or imaging done, the results will automatically release in [employer] without an interpretation.  Your health care professional will review those results and send an interpretation with recommendations as soon as possible, but this may be 1-3 business days.    If you have any questions regarding your visit, please contact your care team.     [employer] [facility]: [phone]  [facility] CLINIC HOURS TELEPHONE NUMBER       Yaneth Linder MD  Assistant Medical Director    Denia - Certified Medical Assistant     Marianne Victor-GARY Carranza-  [place]-     Monday- [place]  8:00 a.m - 5:00 p.m    Tuesday- Surgery        Thursday- [place]  8:00 a.m - 5:00 p.m.    Friday- [place]  7:30 a.m - 4:00 p.m. [facility]  [address]  [place] [address]  [phone] Fax  Imaging Scheduling [phone]    [facility] Delivery  [address] [place] [address] [address]  [place], [place] [address] [phone]    [facility]  [address] [place], [place] [address] [phone] Fax  Imaging Scheduling [phone]     Urgent Care locations:  [facility] Monday-Friday  10 am - 8 pm  Saturday and Sunday   9 am - 5 pm  Monday-Friday   10 am - 8 pm  Saturday and Sunday   9 am - 5 pm   [phone]     **Surgeries** Our Surgery Schedulers will contact you to schedule. If you do not receive a call within 3 business days, please call [phone].    If you need a medication refill, please contact your pharmacy. Please allow 3 business days for your refill to be completed.    As always, thank you for trusting us with your healthcare needs!

## 2022-07-13 ENCOUNTER — OFFICE VISIT (OUTPATIENT)
Dept: PODIATRY | Facility: CLINIC | Age: 82
End: 2022-07-13
Attending: INTERNAL MEDICINE
Payer: COMMERCIAL

## 2022-07-13 VITALS
HEART RATE: 68 BPM | WEIGHT: 165 LBS | SYSTOLIC BLOOD PRESSURE: 136 MMHG | HEIGHT: 63 IN | DIASTOLIC BLOOD PRESSURE: 82 MMHG | BODY MASS INDEX: 29.23 KG/M2 | TEMPERATURE: 98 F

## 2022-07-13 DIAGNOSIS — M76.821 POSTERIOR TIBIAL TENDINITIS OF RIGHT LOWER EXTREMITY: Primary | ICD-10-CM

## 2022-07-13 PROCEDURE — 99213 OFFICE O/P EST LOW 20 MIN: CPT | Performed by: PODIATRIST

## 2022-07-13 ASSESSMENT — PAIN SCALES - GENERAL: PAINLEVEL: SEVERE PAIN (7)
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
## 2022-07-13 NOTE — NURSING NOTE
Dispensed 1 Pneumatic Walking Brace, Size medium, with [facility] agreement signed by patient. Claudia Reddy CMA, July 13, 2022

## 2022-07-14 ENCOUNTER — OFFICE VISIT (OUTPATIENT)
Dept: OBGYN | Facility: OTHER | Age: 82
End: 2022-07-14
Payer: COMMERCIAL

## 2022-07-14 VITALS
HEART RATE: 70 BPM | DIASTOLIC BLOOD PRESSURE: 66 MMHG | OXYGEN SATURATION: 99 % | SYSTOLIC BLOOD PRESSURE: 130 MMHG | WEIGHT: 165 LBS | BODY MASS INDEX: 29.23 KG/M2

## 2022-07-14 DIAGNOSIS — N95.2 ATROPHIC VAGINITIS: Primary | ICD-10-CM

## 2022-07-14 DIAGNOSIS — N81.6 RECTOCELE: ICD-10-CM

## 2022-07-14 PROCEDURE — 99203 OFFICE O/P NEW LOW 30 MIN: CPT | Performed by: OBSTETRICS & GYNECOLOGY

## 2022-07-14 RX ORDER — ESTRADIOL 0.1 MG/G
1 CREAM VAGINAL
Qty: 42.5 G | Refills: 11 | Status: SHIPPED | OUTPATIENT
Start: 2022-07-14 | End: 2023-10-06
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
## 2022-07-18 ENCOUNTER — HOSPITAL ENCOUNTER (OUTPATIENT)
Dept: MRI IMAGING | Facility: CLINIC | Age: 82
Discharge: HOME | End: 2022-07-18
Attending: PODIATRIST | Admitting: PODIATRIST
Payer: MEDICARE

## 2022-07-18 DIAGNOSIS — M76.821 POSTERIOR TIBIAL TENDINITIS OF RIGHT LOWER EXTREMITY: ICD-10-CM

## 2022-07-18 PROCEDURE — G1010 CDSM STANSON: HCPCS

## 2022-07-18 PROCEDURE — 73721 MRI JNT OF LWR EXTRE W/O DYE: CPT | Mod: 26 | Performed by: RADIOLOGY

## 2022-07-18 PROCEDURE — G1010 CDSM STANSON: HCPCS | Performed by: RADIOLOGY

## 2022-07-21 ENCOUNTER — MYC MEDICAL ADVICE (OUTPATIENT)
Dept: INTERNAL MEDICINE | Facility: CLINIC | Age: 82
End: 2022-07-21

## 2022-07-21 ENCOUNTER — OFFICE VISIT (OUTPATIENT)
Dept: PODIATRY | Facility: CLINIC | Age: 82
End: 2022-07-21
Payer: COMMERCIAL

## 2022-07-21 VITALS
SYSTOLIC BLOOD PRESSURE: 132 MMHG | HEIGHT: 63 IN | BODY MASS INDEX: 29.23 KG/M2 | DIASTOLIC BLOOD PRESSURE: 66 MMHG | WEIGHT: 165 LBS

## 2022-07-21 DIAGNOSIS — M19.071 OSTEOARTHRITIS OF RIGHT ANKLE AND FOOT: ICD-10-CM

## 2022-07-21 DIAGNOSIS — M76.821 POSTERIOR TIBIAL TENDINITIS OF RIGHT LOWER EXTREMITY: Primary | ICD-10-CM

## 2022-07-21 DIAGNOSIS — E11.42 DIABETIC POLYNEUROPATHY ASSOCIATED WITH TYPE 2 DIABETES MELLITUS (H): ICD-10-CM

## 2022-07-21 DIAGNOSIS — Q66.6 PES VALGUS OF RIGHT FOOT: ICD-10-CM

## 2022-07-21 PROCEDURE — 99213 OFFICE O/P EST LOW 20 MIN: CPT | Performed by: PODIATRIST

## 2022-07-21 ASSESSMENT — PAIN SCALES - GENERAL: PAINLEVEL: NO PAIN (0)
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
## 2022-07-21 NOTE — PATIENT INSTRUCTIONS
"Nail Debridement    A high quality instrument makes trimming toenails MUCH easier.  Search [employer] for any 5\" nail nipper manufactured by reliable brands such as Miltex, Integra or Jarit as these quality instruments will help manage difficult nails more effectively and comfortably. We use Miltex -SS.  A physician is not necessary to trim nails even if you are taking blood thinners or are diabetic.  Your family or care givers may help manage your toenails.      Trim or sand the nails once weekly.  Do not wait until they are long and painful or trimming will become too difficult and painful and will increase your risk of complications or infection.  A course file or 120 grit sandpaper on a sanding block can be helpful.  For very thick nails many people prefer battery operated camarena such as an Amope', Personal Pedi and Emjoi for regular use or heavy painful callouses or thick toenails.    Trim or skive any portion of nail that is thick, loose, crumbling, or not well attached. Do not tear the nail away, but rather cut them with a nail nipperor sand or sand them down.  You may follow up with your Podiatric Physician if you have pain, bleeding, infection, questions or other concerns.      You may also contact the following Registered Nurses for further help with nail debridement and minor hygiene concnerns.  They may come to your home or meet them at their clinic to trim your toenails and soak your feet, as well as monitor for any complications that would require evaluation by a Physician.      [facility]  Nohemi Gonzalez RN  Phone & text [phone]    [facility]  Natasha Hayden RN  Office [phone]    [email or web address]  [phone]  [email or web address] [facility]    For up to date list and to find foot care nurses in other communities visit [employer] Association website:  afcna.org.     Calluses, Corns, IPKs, Porokeratosis    When there is " excessive friction or pressure on the skin, the body responds by making the skin thicker.  While this may protect the deeper structures, the thickened skin can take up more space and thus increase pressure over a bony prominence or become an open sore or skin ulcer as this skin becomes less flexible.    Flat, diffuse thickening are simple calluses and they are usually caused by friction.  Often these are the result of rubbing on a shoe or going barefoot.    Calluses with a central core between the toes are called corns.  These often result from prominent joints on adjacent toes rubbing together.  Theses are often a symptom of bone malalignment and will usually recur unless the underlying bones are addressed.    Many of these lesions can be kept comfortable with routine maintenance. This consists of filing them with a Ped Egg, callus file, or 120 grit sandpaper on a block, every day during your bath or shower.  Most people prefer battery operated camarena such as an Amope', Personal Pedi and Emjoi for regular use or heavy painful callouses.  Heavy creams or ointments can be applied 1-2 times every day to keep them soft. Toe spacers can be used for corns, gel pads can be used for other lesions on the bottom of the foot. If there is a deformity noted, such as a prominent bone, often this can be addressed to minimize recurrence. However, sometimes the pressure and lesion simply migrates to another spot after surgery, so it is not a guaranteed cure.     If you have severe callouses and cracking, you may apply heavy ointments that you scoop up such as Cetaphil cream, Eucerin, Aquaphor or Vaseline.  Be sure to obtain cream or ointment in these brands and not lotion (lotion is water based and not durable enough for feet). For more aggressive help apply heavy creams or ointment under occlusive dressings such as Saran Wrap or Jelly Feet while sleeping.   Jelly Feet can be obtained at [email or web address].     To be successful  with managing hyperkeratotic skin, you must manage hygiene daily.  Apply the cream once or twice EVERY day.  At your bath or shower time is the easiest time to work on this when skin is most soft.  There is no medical or surgical treatment that will absolutely eliminate many of these symptoms.      Ped[employer]ix is a reliable source for all sorts of foot pads, cushions, or interdigital spacers and foot appliances. Go to www.pedifix.[employer] or request a catalog at 7-[employer].        Please call with any additional questions.

## 2022-07-21 NOTE — TELEPHONE ENCOUNTER
"I see that the patient is seen podiatry today.  I do not know what is that needs a \"prescription\" but I will be happy to sign it once I see the paperwork.    Daisy  "

## 2022-08-08 ENCOUNTER — TELEPHONE (OUTPATIENT)
Dept: NURSING | Facility: CLINIC | Age: 82
End: 2022-08-08

## 2022-08-08 NOTE — TELEPHONE ENCOUNTER
Two days ago on Saturday August 6th woke up with a headache.  Headache present on Sunday.  Denies headache today.  Today has fullness in nose.  Denies fever.  Denies sinus pressure.  Patient has questions on covid testing.  Patient declines triage today as she is wanting covid testing to be able to go vote.

## 2022-08-15 DIAGNOSIS — G45.9 TIA (TRANSIENT ISCHEMIC ATTACK): ICD-10-CM

## 2022-08-15 DIAGNOSIS — E11.42 TYPE 2 DIABETES MELLITUS WITH DIABETIC POLYNEUROPATHY, WITHOUT LONG-TERM CURRENT USE OF INSULIN (H): ICD-10-CM

## 2022-08-16 RX ORDER — CLOPIDOGREL BISULFATE 75 MG/1
TABLET ORAL
Qty: 90 TABLET | Refills: 3 | Status: SHIPPED | OUTPATIENT
Start: 2022-08-16 | End: 2023-10-02

## 2022-08-16 RX ORDER — METFORMIN HYDROCHLORIDE 750 MG/1
TABLET, EXTENDED RELEASE ORAL
Qty: 90 TABLET | Refills: 0 | Status: SHIPPED | OUTPATIENT
Start: 2022-08-16 | End: 2022-09-01

## 2022-08-16 NOTE — TELEPHONE ENCOUNTER
"Requested Prescriptions   Pending Prescriptions Disp Refills    metFORMIN (GLUCOPHAGE-XR) 750 MG 24 hr tablet [Pharmacy Med Name: METFORMIN HYDROCHLORIDE  MG Tablet Extended Release 24 Hour] 90 tablet 0     Sig: TAKE 1 TABLET EVERY DAY WITH DINNER        Biguanide Agents Failed - 8/15/2022  1:45 PM        Failed - Patient has documented A1c within the specified period of time.     If HgbA1C is 8 or greater, it needs to be on file within the past 3 months.  If less than 8, must be on file within the past 6 months.     Recent Labs   Lab Test 02/15/22  1026   A1C 6.8*             Passed - Patient is age 10 or older        Passed - Patient's CR is NOT>1.4 OR Patient's EGFR is NOT<45 within past 12 mos.       Recent Labs   Lab Test 02/15/22  1026 01/14/21  1026   GFRESTIMATED 78 58*   GFRESTBLACK  --  67       Recent Labs   Lab Test 02/15/22  1026   CR 0.77             Passed - Patient does NOT have a diagnosis of CHF.        Passed - Medication is active on med list        Passed - Patient is not pregnant        Passed - Patient has not had a positive pregnancy test within the past 12 mos.         Passed - Recent (6 mo) or future (30 days) visit within the authorizing provider's specialty     Patient had office visit in the last 6 months or has a visit in the next 30 days with authorizing provider or within the authorizing provider's specialty.  See \"Patient Info\" tab in inbasket, or \"Choose Columns\" in Meds & Orders section of the refill encounter.               clopidogrel (PLAVIX) 75 MG tablet [Pharmacy Med Name: CLOPIDOGREL 75 MG Tablet] 90 tablet 3     Sig: TAKE 1 TABLET EVERY DAY        Plavix Failed - 8/15/2022  1:45 PM        Failed - Normal HGB on file in past 12 months     Recent Labs   Lab Test 09/29/19  1451   HGB 10.7*                 Failed - Normal Platelets on file in past 12 months       Recent Labs   Lab Test 09/29/19  1451                  Passed - No active PPI on record unless is " "Protonix        Passed - Recent (12 mo) or future (30 days) visit within the authorizing provider's specialty     Patient has had an office visit with the authorizing provider or a provider within the authorizing providers department within the previous 12 mos or has a future within next 30 days. See \"Patient Info\" tab in inbasket, or \"Choose Columns\" in Meds & Orders section of the refill encounter.              Passed - Medication is active on med list        Passed - Patient is age 18 or older        Passed - No active pregnancy on record        Passed - No positive pregnancy test in past 12 months               Dania Thompson RN  "

## 2022-08-29 DIAGNOSIS — E11.42 TYPE 2 DIABETES MELLITUS WITH DIABETIC POLYNEUROPATHY, WITHOUT LONG-TERM CURRENT USE OF INSULIN (H): ICD-10-CM

## 2022-08-31 NOTE — TELEPHONE ENCOUNTER
"Requested Prescriptions   Pending Prescriptions Disp Refills    metFORMIN (GLUCOPHAGE-XR) 750 MG 24 hr tablet [Pharmacy Med Name: METFORMIN 750MG ER TABLET] 90 tablet 0     Sig: TAKE 1 TABLET EVERY DAY WITH DINNER        Biguanide Agents Failed - 8/29/2022  8:49 AM        Failed - Patient has documented A1c within the specified period of time.     If HgbA1C is 8 or greater, it needs to be on file within the past 3 months.  If less than 8, must be on file within the past 6 months.     Recent Labs   Lab Test 02/15/22  1026   A1C 6.8*             Passed - Patient is age 10 or older        Passed - Patient's CR is NOT>1.4 OR Patient's EGFR is NOT<45 within past 12 mos.       Recent Labs   Lab Test 02/15/22  1026 01/14/21  1026   GFRESTIMATED 78 58*   GFRESTBLACK  --  67       Recent Labs   Lab Test 02/15/22  1026   CR 0.77             Passed - Patient does NOT have a diagnosis of CHF.        Passed - Medication is active on med list        Passed - Patient is not pregnant        Passed - Patient has not had a positive pregnancy test within the past 12 mos.         Passed - Recent (6 mo) or future (30 days) visit within the authorizing provider's specialty     Patient had office visit in the last 6 months or has a visit in the next 30 days with authorizing provider or within the authorizing provider's specialty.  See \"Patient Info\" tab in inbasket, or \"Choose Columns\" in Meds & Orders section of the refill encounter.                  ANGY Gonzalez, RN     "

## 2022-09-01 RX ORDER — METFORMIN HYDROCHLORIDE 750 MG/1
TABLET, EXTENDED RELEASE ORAL
Qty: 90 TABLET | Refills: 0 | Status: SHIPPED | OUTPATIENT
Start: 2022-09-01 | End: 2023-03-10

## 2022-09-27 DIAGNOSIS — E11.42 TYPE 2 DIABETES MELLITUS WITH DIABETIC POLYNEUROPATHY, WITHOUT LONG-TERM CURRENT USE OF INSULIN (H): ICD-10-CM

## 2022-09-29 NOTE — TELEPHONE ENCOUNTER
"Requested Prescriptions   Pending Prescriptions Disp Refills    TRUE METRIX BLOOD GLUCOSE TEST test strip [Pharmacy Med Name: TRUE METRIX GLUCOSE TEST STRIP]  0     Sig: USE TO TEST BLOOD SUGARS TWICE DAILY OR AS DIRECTED        Diabetic Supplies Protocol Failed - 9/27/2022 12:17 PM        Failed - Recent (6 mo) or future (30 days) visit within the authorizing provider's specialty     Patient had office visit in the last 6 months or has a visit in the next 30 days with authorizing provider.  See \"Patient Info\" tab in inbasket, or \"Choose Columns\" in Meds & Orders section of the refill encounter.            Passed - Medication is active on med list        Passed - Patient is 18 years of age or older                ANGY Araya, RN          "

## 2022-09-30 RX ORDER — CALCIUM CITRATE/VITAMIN D3 200MG-6.25
TABLET ORAL
Qty: 100 STRIP | Refills: 3 | Status: SHIPPED | OUTPATIENT
Start: 2022-09-30

## 2022-10-09 ENCOUNTER — HEALTH MAINTENANCE LETTER (OUTPATIENT)
Age: 82
End: 2022-10-09

## 2022-11-01 ENCOUNTER — OFFICE VISIT (OUTPATIENT)
Dept: AUDIOLOGY | Facility: CLINIC | Age: 82
End: 2022-11-01
Payer: COMMERCIAL

## 2022-11-01 DIAGNOSIS — H90.3 SENSORINEURAL HEARING LOSS, BILATERAL: Primary | ICD-10-CM

## 2022-11-01 PROCEDURE — 99207 PR NO CHARGE LOS: CPT | Performed by: AUDIOLOGIST

## 2022-11-01 PROCEDURE — V5010 ASSESSMENT FOR HEARING AID: HCPCS | Performed by: AUDIOLOGIST

## 2022-11-01 NOTE — PROGRESS NOTES
Hearing Aid Check     SUBJECTIVE:  Charlotte Snow is a 82 year old year old female, seen today for a hearing aid check at [facility]. Previous evaluation 12/6/2019 showed mild to moderate to severe sensorineural hearing loss bilaterally. The patient is currently using binaural Jennifer Halo i90 ERIC hearing aids. She reported she is not hearing as well even with consistent hearing aid use, speech isn't as clear.     OBJECTIVE:  Otoscopic examination showed moderate non-occluding wax right ear, removed without incident using a lighted curette and micro forceps. Programming changes were made to increased soft, moderate, and loud 1 increment and 2.5-4k moderate 1 increment hearing aid maintenance to vacuum fan ports and wax filters. Biologic listening check revealed appropriate function of the hearing aids. Changed to medium closed domes.     PLAN:   Recommended return for audiology evaluation and hearing aid programming if needed.     Mynor Ordonez Licensed Audiologist #[id number]

## 2022-11-09 ENCOUNTER — LAB (OUTPATIENT)
Dept: LAB | Facility: CLINIC | Age: 82
End: 2022-11-09
Payer: COMMERCIAL

## 2022-11-09 ENCOUNTER — HOSPITAL ENCOUNTER (OUTPATIENT)
Dept: MAMMOGRAPHY | Facility: CLINIC | Age: 82
Discharge: HOME | End: 2022-11-09
Attending: INTERNAL MEDICINE | Admitting: INTERNAL MEDICINE
Payer: MEDICARE

## 2022-11-09 DIAGNOSIS — N30.90 CYSTITIS WITHOUT HEMATURIA: ICD-10-CM

## 2022-11-09 DIAGNOSIS — E11.42 DIABETIC POLYNEUROPATHY ASSOCIATED WITH TYPE 2 DIABETES MELLITUS (H): Primary | ICD-10-CM

## 2022-11-09 DIAGNOSIS — Z12.31 VISIT FOR SCREENING MAMMOGRAM: ICD-10-CM

## 2022-11-09 LAB
ALBUMIN UR-MCNC: 30 MG/DL
APPEARANCE UR: ABNORMAL
BACTERIA #/AREA URNS HPF: ABNORMAL /HPF
BILIRUB UR QL STRIP: NEGATIVE
COLOR UR AUTO: YELLOW
GLUCOSE UR STRIP-MCNC: NEGATIVE MG/DL
HBA1C MFR BLD: 11.3 % (ref 0–5.6)
HGB UR QL STRIP: NEGATIVE
HYALINE CASTS: 11 /LPF
KETONES UR STRIP-MCNC: 15 MG/DL
LEUKOCYTE ESTERASE UR QL STRIP: ABNORMAL
MUCOUS THREADS #/AREA URNS LPF: PRESENT /LPF
NITRATE UR QL: POSITIVE
PH UR STRIP: 6 [PH] (ref 5–7)
RBC URINE: 2 /HPF
SP GR UR STRIP: 1.02 (ref 1–1.03)
SQUAMOUS EPITHELIAL: 11 /HPF
UROBILINOGEN UR STRIP-MCNC: NORMAL MG/DL
WBC URINE: 61 /HPF

## 2022-11-09 PROCEDURE — 36415 COLL VENOUS BLD VENIPUNCTURE: CPT

## 2022-11-09 PROCEDURE — 83036 HEMOGLOBIN GLYCOSYLATED A1C: CPT

## 2022-11-09 PROCEDURE — 87086 URINE CULTURE/COLONY COUNT: CPT

## 2022-11-09 PROCEDURE — 77067 SCR MAMMO BI INCL CAD: CPT

## 2022-11-09 PROCEDURE — 81001 URINALYSIS AUTO W/SCOPE: CPT

## 2022-11-11 ENCOUNTER — TELEPHONE (OUTPATIENT)
Dept: INTERNAL MEDICINE | Facility: CLINIC | Age: 82
End: 2022-11-11

## 2022-11-11 LAB — BACTERIA UR CULT: NORMAL

## 2022-11-11 NOTE — TELEPHONE ENCOUNTER
Reason for Call:  Other call back    Detailed comments: patient called and scheduled an appointment on December 14 with Daisy Webster at [facility].    Patient wants to know if she can see provider in addition to for right knee pain with surgery years ago.  Would like a Cortizone Shot.    Or a referral to Orthopedics.    Please contact patient.  Thank you.    Phone Number Patient can be reached at: Home number on file [phone] (home)    Best Time: any    Can we leave a detailed message on this number? YES    Call taken on 11/11/2022 at 1:02 PM by Maye Rivas

## 2022-11-16 ENCOUNTER — TELEPHONE (OUTPATIENT)
Dept: AUDIOLOGY | Facility: CLINIC | Age: 82
End: 2022-11-16

## 2022-11-16 NOTE — TELEPHONE ENCOUNTER
Reason for Call:  Other appointment    Detailed comments: Patient is having issues with connecting her hearing aid with her phone. She says it's not working at all.  Also- her battery level for her left hearing aid on her phone always shows that it's low even when changing the battery. She is also wondering if the loudness can be turned up on the hearing aids? Please call her. Ok to leave a message.     Phone Number Patient can be reached at: Home number on file [phone] (home)    Best Time: any    Can we leave a detailed message on this number? YES    Call taken on 11/16/2022 at 2:21 PM by Maryellen Mackey

## 2022-11-21 ENCOUNTER — TELEPHONE (OUTPATIENT)
Dept: INTERNAL MEDICINE | Facility: CLINIC | Age: 82
End: 2022-11-21

## 2022-11-21 ENCOUNTER — ALLIED HEALTH/NURSE VISIT (OUTPATIENT)
Dept: AUDIOLOGY | Facility: CLINIC | Age: 82
End: 2022-11-21
Payer: COMMERCIAL

## 2022-11-21 DIAGNOSIS — H90.3 SENSORINEURAL HEARING LOSS, BILATERAL: Primary | ICD-10-CM

## 2022-11-21 DIAGNOSIS — H90.6 MIXED CONDUCTIVE AND SENSORINEURAL HEARING LOSS OF BOTH EARS: Primary | ICD-10-CM

## 2022-11-21 PROCEDURE — V5010 ASSESSMENT FOR HEARING AID: HCPCS | Performed by: AUDIOLOGIST

## 2022-11-21 PROCEDURE — 99207 PR NO CHARGE LOS: CPT | Performed by: AUDIOLOGIST

## 2022-11-21 NOTE — PROGRESS NOTES
Hearing Aid Check     SUBJECTIVE:  Charlotte Snow is a 82 year old year old female, seen today for a hearing aid check at [facility]. Previous evaluation 12/6/2019 showed mild to moderate to severe sensorineural hearing loss bilaterally. The patient is currently using binaural Jennifer Halo i90 ERIC hearing aids. She reported inconsistent sound output from the hearing aids and Bluetooth is not connected.     OBJECTIVE:  Otoscopic examination showed clear canals and visible landmarks bilaterally, there is an area of minor irritation in the posterior canal entrance. No programming changes were made, hearing aid maintenance to vacuum fan ports and wax filters. Biologic listening check revealed appropriate function of the hearing aids. Changed to small closed domes for easier insertion. Paired hearing aids to her iPhone and successfully streamed a call.     PLAN:   Recommended return for audiology evaluation and hearing aid programming.     Cecilia Ordonez.  [place] Licensed Audiologist #[id number]

## 2022-11-21 NOTE — TELEPHONE ENCOUNTER
Reason for Call:  Other call back    Detailed comments: Patient has an audiology appointment in January and needs a referral for insurance to cover this.  She will be seeing Courtney Rosales on 1/23/23 .  Thanks    Phone Number Patient can be reached at: Home number on file [phone] (home) or Cell number on file:    Telephone Information:   Mobile [phone]       Best Time: any    Can we leave a detailed message on this number? YES    Call taken on 11/21/2022 at 4:28 PM by Sherry Vicente

## 2022-12-12 DIAGNOSIS — K21.9 GASTROESOPHAGEAL REFLUX DISEASE WITHOUT ESOPHAGITIS: ICD-10-CM

## 2022-12-14 ENCOUNTER — OFFICE VISIT (OUTPATIENT)
Dept: INTERNAL MEDICINE | Facility: CLINIC | Age: 82
End: 2022-12-14
Payer: COMMERCIAL

## 2022-12-14 VITALS
BODY MASS INDEX: 27.46 KG/M2 | RESPIRATION RATE: 14 BRPM | HEART RATE: 78 BPM | OXYGEN SATURATION: 99 % | DIASTOLIC BLOOD PRESSURE: 74 MMHG | TEMPERATURE: 97.7 F | WEIGHT: 155 LBS | SYSTOLIC BLOOD PRESSURE: 136 MMHG

## 2022-12-14 DIAGNOSIS — E11.42 TYPE 2 DIABETES MELLITUS WITH DIABETIC POLYNEUROPATHY, WITHOUT LONG-TERM CURRENT USE OF INSULIN (H): Primary | ICD-10-CM

## 2022-12-14 DIAGNOSIS — E78.5 HYPERLIPIDEMIA LDL GOAL <100: ICD-10-CM

## 2022-12-14 DIAGNOSIS — Z23 NEED FOR PROPHYLACTIC VACCINATION AND INOCULATION AGAINST INFLUENZA: ICD-10-CM

## 2022-12-14 PROCEDURE — 90662 IIV NO PRSV INCREASED AG IM: CPT | Performed by: INTERNAL MEDICINE

## 2022-12-14 PROCEDURE — 99214 OFFICE O/P EST MOD 30 MIN: CPT | Mod: 25 | Performed by: INTERNAL MEDICINE

## 2022-12-14 PROCEDURE — G0008 ADMIN INFLUENZA VIRUS VAC: HCPCS | Performed by: INTERNAL MEDICINE

## 2022-12-14 RX ORDER — BLOOD-GLUCOSE SENSOR
1 EACH MISCELLANEOUS
Qty: 2 EACH | Refills: 4 | Status: SHIPPED | OUTPATIENT
Start: 2022-12-14 | End: 2022-12-14

## 2022-12-14 RX ORDER — PANTOPRAZOLE SODIUM 40 MG/1
TABLET, DELAYED RELEASE ORAL
Qty: 90 TABLET | Refills: 1 | Status: SHIPPED | OUTPATIENT
Start: 2022-12-14 | End: 2023-08-08

## 2022-12-14 RX ORDER — BLOOD-GLUCOSE SENSOR
1 EACH MISCELLANEOUS
Qty: 2 EACH | Refills: 4 | Status: SHIPPED | OUTPATIENT
Start: 2022-12-14 | End: 2023-04-12

## 2022-12-14 NOTE — PROGRESS NOTES
Prior to immunization administration, verified patients identity using patient s name and date of birth. Please see Immunization Activity for additional information.     Screening Questionnaire for Adult Immunization    Are you sick today?   No   Do you have allergies to medications, food, a vaccine component or latex?   Yes   Have you ever had a serious reaction after receiving a vaccination?   No   Do you have a long-term health problem with heart, lung, kidney, or metabolic disease (e.g., diabetes), asthma, a blood disorder, no spleen, complement component deficiency, a cochlear implant, or a spinal fluid leak?  Are you on long-term aspirin therapy?   Yes   Do you have cancer, leukemia, HIV/AIDS, or any other immune system problem?   No   Do you have a parent, brother, or sister with an immune system problem?   No   In the past 3 months, have you taken medications that affect  your immune system, such as prednisone, other steroids, or anticancer drugs; drugs for the treatment of rheumatoid arthritis, Crohn s disease, or psoriasis; or have you had radiation treatments?   No   Have you had a seizure, or a brain or other nervous system problem?   No   During the past year, have you received a transfusion of blood or blood    products, or been given immune (gamma) globulin or antiviral drug?   No   For women: Are you pregnant or is there a chance you could become       pregnant during the next month?   No   Have you received any vaccinations in the past 4 weeks?   No     Immunization questionnaire was positive for at least one answer.  Notified Yes.        Per orders of Dr. Villa, injection of HD  influenza given by Lakisha Gillette CMA. Patient instructed to remain in clinic for 15 minutes afterwards, and to report any adverse reaction to me immediately.       Screening performed by Lakisha Gillette CMA on 12/14/2022 at 4:06 PM.

## 2022-12-14 NOTE — PROGRESS NOTES
Charleen Porter is a 82 year old, presenting for the following health issues:  Diabetes      History of Present Illness       Diabetes:   She presents for follow up of diabetes.  She is checking home blood glucose with a continuous glucose monitor.  She checks blood glucose before meals, after meals and at bedtime.  Blood glucose is sometimes over 200 and sometimes under 70. She is aware of hypoglycemia symptoms including shakiness, dizziness, weakness, lethargy, blurred vision and confusion. She is concerned about blood sugar frequently over 200. She is not experiencing numbness or burning in feet, excessive thirst, blurry vision, weight changes or redness, sores or blisters on feet. The patient has had a diabetic eye exam in the last 12 months.         She eats 2-3 servings of fruits and vegetables daily.She consumes 0 sweetened beverage(s) daily.She exercises with enough effort to increase her heart rate 9 or less minutes per day.  She exercises with enough effort to increase her heart rate 3 or less days per week.   She is taking medications regularly.        EMR reviewed including:             Complaint, History of Chief Complaint, Corresponding Review of Systems, and Complaint Specific Physical Examination.    #1   Follow-up on type 2 diabetes  Patient currently on metformin.  Denies any diarrhea or symptoms from medication.  Most recent hemoglobin A1c was 11.3.  This is markedly up from the 6.8 could have less than a year ago.  Denies current polyuria or polydipsia.  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  Notes that she has not been watching her diet.  Is on statin, Plavix.  Allergic to ACE and ARB.        Exam:   LUNGS: clear bilaterally, airflow is brisk, no intercostal retraction or stridor is noted. No coughing is noted during visit.   HEART:  regular without rubs, clicks, gallops, or murmurs. PMI is nondisplaced. Upstrokes are brisk. S1,S2 are  heard.   GI: Abdomen is soft, without rebound, guarding or tenderness. Bowel sounds are appropriate. No renal bruits are heard.   NEURO: Pt is alert and appropriate. No neurologic lateralization is noted. Cranial nerves 2-12 are intact. Peripheral sensory function is markedly decreased in the feet.     Feet: no evidence of skin breakdown or ulceration is noted.  Sensation is decreased to monofilament and vibration.  Pulses are strong, capillary refill is brisk.      #2   Prior TIA.  No signs of residual neurologic sequelae.  Continues antiplatelet therapy.      #3   History of hyperlipidemia  Currently on 40 mg of atorvastatin.  Denies muscle pain or side effects.        Exam:   MS: Minimal crepitance is noted in the extremities. No deformity is present. Muscle strength is appropriate and equal bilaterally. No acute joint erythema or swelling is present.          Patient has been interviewed, applicable history and applied review of systems have been performed.    Vital Signs:   /74   Pulse 78   Temp 97.7  F (36.5  C) (Temporal)   Resp 14   Wt 70.3 kg (155 lb)   SpO2 99%   BMI 27.46 kg/m        Decision Making    Problem and Complexity     1. Type 2 diabetes mellitus with diabetic polyneuropathy, without long-term current use of insulin (H)  Continues glucose monitoring.  We will set up with diabetic education and MTM.   Hopefully this will encourage compliance.    - Continuous Blood Gluc  (FREESTYLE JUN 2 READER) ERIN; 1 each once for 1 dose Use to read blood sugars per 's instructions.  Dispense: 1 each; Refill: 0  - Continuous Blood Gluc Sensor (FREESTYLE JUN 2 SENSOR) MISC; 1 each every 14 days Use 1 sensor every 14 days. Use to read blood sugars per 's instructions.  Dispense: 2 each; Refill: 5  - Med Therapy Management Referral  - Continuous Blood Gluc Sensor (FREESTYLE JUN 3 SENSOR) MISC; 1 Units every 14 days  Dispense: 2 each; Refill: 4    2. Need for  prophylactic vaccination and inoculation against influenza  Flu shot given  - INFLUENZA, QUAD, HIGH DOSE, PF, 65YR + (FLUZONE HD)    3. Hyperlipidemia LDL goal <100  Continue statin therapy.  Follow lipid and liver as needed                                FOLLOW UP   I have asked the patient to make an appointment for followup with either:  1.  Me,  2.  The patient's preferred provider,  3.  Any available provider  In 2 months        Regarding routine vaccinations:  I have reviewed the patient's vaccination schedule and discussed the benefits of prophylactic vaccination in detail.  I recommend the patient contact their pharmacist (not the pharmacy within the clinic) for vaccinations.  Discussed that most insurance companies now favor reimbursement to the pharmacies and it will financially behoove the patient to have vaccinations performed at their pharmacy.        I have carefully explained the diagnosis and treatment options to the patient.  The patient has displayed an understanding of the above, and all subsequent questions were answered.      DO JT Thompson    Portions of this note were produced using Apps4Pro  Although every attempt at real-time proof reading has been made, occasional grammar/syntax errors may have been missed.

## 2022-12-16 ENCOUNTER — TELEPHONE (OUTPATIENT)
Dept: INTERNAL MEDICINE | Facility: CLINIC | Age: 82
End: 2022-12-16

## 2022-12-16 NOTE — TELEPHONE ENCOUNTER
MTM referral from: [facility] visit (referral by provider)    MTM referral outreach attempt #2 on December 16, 2022 at 10:52 AM      Outcome: Patient not reachable after several attempts, will route to MTM Pharmacist/Provider as an FYI.  MT scheduling number is [phone].  Thank you for the referral.    Brandon Bunch, MTM coordinator    [facility] 2022, private pay 2023

## 2023-01-24 ENCOUNTER — OFFICE VISIT (OUTPATIENT)
Dept: AUDIOLOGY | Facility: CLINIC | Age: 83
End: 2023-01-24
Payer: COMMERCIAL

## 2023-01-24 DIAGNOSIS — H90.6 MIXED CONDUCTIVE AND SENSORINEURAL HEARING LOSS OF BOTH EARS: ICD-10-CM

## 2023-01-24 DIAGNOSIS — H90.3 SENSORINEURAL HEARING LOSS, BILATERAL: Primary | ICD-10-CM

## 2023-01-24 PROCEDURE — 99207 PR NO CHARGE LOS: CPT | Performed by: AUDIOLOGIST

## 2023-01-24 PROCEDURE — 92567 TYMPANOMETRY: CPT | Performed by: AUDIOLOGIST

## 2023-01-24 PROCEDURE — 92557 COMPREHENSIVE HEARING TEST: CPT | Performed by: AUDIOLOGIST

## 2023-01-25 NOTE — PROGRESS NOTES
AUDIOLOGY REPORT    SUBJECTIVE:  Charlotte Snow is a 82 year old female who was seen in the Audiology Clinic at the [facility] for audiologic evaluation, referred by Aron Villa D.O. for concerned about a decrease in hearing even with consistent hearing aid use. She denied tinnitus, dizziness, vertigo, and any falls in the last year. Previous evaluation 12/6/2019 showed mild to moderate to severe sensorineural hearing loss. They were unaccompanied today. Charlotte uses binaural Vontoo Halo i90 ERIC devices.    OBJECTIVE:  Abuse Screening:  Do you feel unsafe at home or work/school? No  Do you feel threatened by someone? No  Does anyone try to keep you from having contact with others, or doing things outside of your home? No  Physical signs of abuse present? No     Fall Risk Screen:  1. Have you fallen two or more times in the past year? No  2. Have you fallen and had an injury in the past year? No    Timed Up and Go Score (in seconds): not tested  Is patient a fall risk? No  Referral initiated: No  Fall Risk Assessment Completed by Audiology    Otoscopic exam indicates ears are clear of cerumen bilaterally, minimal non-occluding wax removed without incident using a lighted curette.     Pure Tone Thresholds assessed using conventional audiometry with good reliability from 250-8000 Hz bilaterally using insert earphones and circumaural headphones     RIGHT:  mild sloping to severe sensorineural hearing loss    LEFT:    mild sloping to severe sensorineural hearing loss    Tympanogram:    RIGHT: normal pressure and excessive mobility    LEFT:  normal pressure and excessive mobility    Reflexes (reported by stimulus ear):  RIGHT: Ipsilateral is could not maintain seal to test  RIGHT: Contralateral is could not maintain seal to test  LEFT:   Ipsilateral is could not maintain seal to test  LEFT:   Contralateral is could not maintain seal to test    Speech Reception Threshold:    RIGHT:  45 dB HL    LEFT:   35 dB HL    Word Recognition Score:     RIGHT: 88% at 85 dB HL using NU-6 recorded word list.    LEFT:   76% at 75 dB HL using NU-6 recorded word list.    Attempted to program hearing aids, however there was a computer issue and this portion of the appointment needs to be rescheduled.      ASSESSMENT:     ICD-10-CM    1. Sensorineural hearing loss, bilateral  H90.3 Cmprhn Audiometry Thrshld Eval & Speech Recog (77917)     Tympanometry (impedance - testing) (08345)      2. Mixed conductive and sensorineural hearing loss of both ears  H90.6 Adult Audiology  Referral        Compared to patient's previous audiogram dated 12/6/2019, hearing has decreased 10 dB across most frequencies. Today s results were discussed with the patient in detail.     PLAN:    Patient was counseled regarding hearing loss and impact on communication. She will likely benefit from a programming adjustment in her current hearing aids. Discussed when the current receivers are at the limit, we may need to increase the  strength, also discussed the benefits of custom earmolds to use with her current hearing aids. It is recommended that the patient follow as needed for audiology evaluation. The programming portion of the appointment will be rescheduled when the software/ issue is resolved with Hi[employer].  Please call this clinic with questions regarding these results or recommendations.        Cecilia Ordonez.  [place] Licensed Audiologist #[id number]

## 2023-02-02 DIAGNOSIS — G45.9 TIA (TRANSIENT ISCHEMIC ATTACK): ICD-10-CM

## 2023-02-02 RX ORDER — ATORVASTATIN CALCIUM 40 MG/1
TABLET, FILM COATED ORAL
Qty: 90 TABLET | Refills: 0 | Status: SHIPPED | OUTPATIENT
Start: 2023-02-02 | End: 2024-01-11

## 2023-02-02 NOTE — TELEPHONE ENCOUNTER
Prescription approved per [facility] Refill Protocol.  Florence High RN on 2/2/2023 at 5:20 PM

## 2023-02-28 ENCOUNTER — OFFICE VISIT (OUTPATIENT)
Dept: AUDIOLOGY | Facility: CLINIC | Age: 83
End: 2023-02-28
Payer: COMMERCIAL

## 2023-02-28 DIAGNOSIS — H90.6 MIXED CONDUCTIVE AND SENSORINEURAL HEARING LOSS OF BOTH EARS: Primary | ICD-10-CM

## 2023-02-28 PROCEDURE — 92593 PR HEARING AID CHECK, BINAURAL: CPT | Performed by: AUDIOLOGIST

## 2023-02-28 PROCEDURE — 99207 PR NO CHARGE LOS: CPT | Performed by: AUDIOLOGIST

## 2023-02-28 NOTE — PROGRESS NOTES
AUDIOLOGY REPORT - Hearing Aid Programming     SUBJECTIVE:  Charlotte Snow is a 82 year old year old female, seen today for a hearing aid programming at [facility]. Previous evaluation 1/24/2023 showed mild to severe sensorineural hearing loss bilaterally. The patient is currently using binaural Jennifer Halo i90 ERIC hearing aids. She reported she sometimes struggles to hear clearly and is interested in custom earmolds.     OBJECTIVE:  Otoscopic examination showed clear canals and visible landmarks bilaterally, binaural earmolds were taken without incident. Hearing aid programming was completed, increases were made to match NAL-NL2 prescriptive targets based on her most recent hearing test. Good match 750 Hz to 3000 Hz, at max output with the 50 gain .    PLAN:   She will be contacted when the custom earmolds arrive for fitting and hearing aid programming as needed.     Cecilia Ordonez.  [place] Licensed Audiologist #[id number]

## 2023-03-10 DIAGNOSIS — E11.42 TYPE 2 DIABETES MELLITUS WITH DIABETIC POLYNEUROPATHY, WITHOUT LONG-TERM CURRENT USE OF INSULIN (H): ICD-10-CM

## 2023-03-10 RX ORDER — METFORMIN HYDROCHLORIDE 750 MG/1
TABLET, EXTENDED RELEASE ORAL
Qty: 90 TABLET | Refills: 0 | Status: SHIPPED | OUTPATIENT
Start: 2023-03-10 | End: 2023-03-27 | Stop reason: DRUGHIGH

## 2023-03-10 NOTE — TELEPHONE ENCOUNTER
Pending Prescriptions:                       Disp   Refills    metFORMIN (GLUCOPHAGE-XR) 750 MG 24 hr tab*90 tab*0        Sig: TAKE 1 TABLET EVERY DAY WITH DINNER      Routing refill request to provider for review/approval because:  Labs out of range:  creatinine    Florence High RN on 3/10/2023 at 2:13 PM

## 2023-03-25 ENCOUNTER — HEALTH MAINTENANCE LETTER (OUTPATIENT)
Age: 83
End: 2023-03-25

## 2023-03-27 ENCOUNTER — ALLIED HEALTH/NURSE VISIT (OUTPATIENT)
Dept: AUDIOLOGY | Facility: CLINIC | Age: 83
End: 2023-03-27
Payer: COMMERCIAL

## 2023-03-27 ENCOUNTER — OFFICE VISIT (OUTPATIENT)
Dept: PHARMACY | Facility: CLINIC | Age: 83
End: 2023-03-27
Attending: INTERNAL MEDICINE
Payer: COMMERCIAL

## 2023-03-27 ENCOUNTER — TELEPHONE (OUTPATIENT)
Dept: INTERNAL MEDICINE | Facility: CLINIC | Age: 83
End: 2023-03-27

## 2023-03-27 DIAGNOSIS — G45.9 TIA (TRANSIENT ISCHEMIC ATTACK): ICD-10-CM

## 2023-03-27 DIAGNOSIS — K21.9 GASTROESOPHAGEAL REFLUX DISEASE, UNSPECIFIED WHETHER ESOPHAGITIS PRESENT: ICD-10-CM

## 2023-03-27 DIAGNOSIS — E78.5 HYPERLIPIDEMIA LDL GOAL <100: ICD-10-CM

## 2023-03-27 DIAGNOSIS — E11.9 TYPE 2 DIABETES MELLITUS WITHOUT COMPLICATION, WITHOUT LONG-TERM CURRENT USE OF INSULIN (H): Primary | ICD-10-CM

## 2023-03-27 DIAGNOSIS — Z78.9 TAKES DIETARY SUPPLEMENTS: ICD-10-CM

## 2023-03-27 DIAGNOSIS — N39.0 RECURRENT UTI: ICD-10-CM

## 2023-03-27 DIAGNOSIS — H90.3 SENSORINEURAL HEARING LOSS, BILATERAL: Primary | ICD-10-CM

## 2023-03-27 PROCEDURE — 99207 PR NO CHARGE LOS: CPT | Performed by: PHARMACIST

## 2023-03-27 PROCEDURE — V5010 ASSESSMENT FOR HEARING AID: HCPCS | Performed by: AUDIOLOGIST

## 2023-03-27 PROCEDURE — 99207 PR NO CHARGE LOS: CPT | Performed by: AUDIOLOGIST

## 2023-03-27 RX ORDER — MULTIVIT WITH MINERALS/LUTEIN
500 TABLET ORAL DAILY
COMMUNITY

## 2023-03-27 RX ORDER — CHOLECALCIFEROL (VITAMIN D3) 50 MCG
1 TABLET ORAL DAILY
COMMUNITY

## 2023-03-27 RX ORDER — PYRIDOXINE HCL (VITAMIN B6) 100 MG
1 TABLET ORAL 2 TIMES DAILY
COMMUNITY

## 2023-03-27 RX ORDER — ZINC OXIDE 13 %
2 CREAM (GRAM) TOPICAL DAILY
COMMUNITY

## 2023-03-27 RX ORDER — SODIUM PHOSPHATE,MONO-DIBASIC 19G-7G/118
1 ENEMA (ML) RECTAL 2 TIMES DAILY
COMMUNITY

## 2023-03-27 RX ORDER — METFORMIN HCL 500 MG
1000 TABLET, EXTENDED RELEASE 24 HR ORAL
Qty: 180 TABLET | Refills: 1 | Status: SHIPPED | OUTPATIENT
Start: 2023-03-27 | End: 2023-05-09

## 2023-03-27 NOTE — PATIENT INSTRUCTIONS
Recommendations from today's MTM visit:                                                    MTM (medication therapy management) is a service provided by a clinical pharmacist designed to help you get the most of out of your medicines.   Today we reviewed what your medicines are for, how to know if they are working, that your medicines are safe and how to make your medicine regimen as easy as possible.      The appointment reminder on [place] was to schedule an annual wellness visit with Dr. Villa - you can call the clinic at [phone] to schedule. They will probably recheck your labs including an A1c at this visit. You've been doing a very good job at getting your blood sugars back under control.     We will change your metformin XR to a 500 mg tablet and have you take two tablets (1,000 mg) daily.  Cutting an extended release tablet may impact the deliver of the drug so we will just use a slightly lower overall dose to avoid this potential issue.     3. You can move your vitamin B complex to every morning instead of evening. Sometimes B vitamins can help with energy so taking in the morning may help.    4. Airport tips for [employer] [place] (below) - you may want to call [place] to see if the sensors can go through your luggage/in the scanner - [phone]. If you end up wearing a sensor it could damage the sensor going through the full body scanner/luggage scan and may produce inaccurate results - you may need to consider going without during your trip so you don't waste them.      You may use your System while on an aircraft, following any requests from the flight crew.    Some airport full-body scanners include x-ray or millimeter radio-wave, which you cannot expose your Sensor to. The effect of these scanners has not been evaluated and the exposure may damage the Sensor or cause inaccurate results. To avoid removing your Sensor, you may request another type of screening. If you do choose to go through a  "full-body scanner, you must remove your Sensor.  The Sensor can be exposed to common electrostatic (ESD) and electromagnetic interference (FRANCISCO JAVIER), including airport metal detectors. You can keep your [place] on while going through these.    Follow-up: 1 year or sooner if needed    It was great speaking with you today.  I value your experience and would be very thankful for your time in providing feedback in our clinic survey. In the next few days, you may receive an email or text message from [facility] with a link to a survey related to your  clinical pharmacist.\"     To schedule another MTM appointment, please call the clinic directly or you may call the MTM scheduling line at [phone] or toll-free at [phone].     My Clinical Pharmacist's contact information:                                                      Please feel free to contact me with any questions or concerns you have.      Virgil Morgan, PharmD, BCACP  Medication Therapy Management Pharmacist  Pager: [phone]    "

## 2023-03-27 NOTE — PROGRESS NOTES
Medication Therapy Management (MTM) Encounter    ASSESSMENT:                            Medication Adherence/Access: No issues identified    Type 2 Diabetes:  Improved. Estimated A1c is significantly improved. Would benefit from continuing current therapy/efforts.    History of stroke/Hyperlipidemia: Stable.     GERD: Stable.     Recurrent UTI: Stable per patient.    Supplements: Stable.     PLAN:                            1. The appointment reminder on Trini was to schedule an annual wellness visit with Dr. Villa - you can call the clinic at [phone] to schedule. They will probably recheck your labs including an A1c at this visit. You've been doing a very good job at getting your blood sugars back under control.     2. We will change your metformin XR to a 500 mg tablet and have you take two tablets (1,000 mg) daily.  Cutting an extended release tablet may impact the deliver of the drug so we will just use a slightly lower overall dose to avoid this potential issue.     3. You can move your vitamin B complex to every morning instead of evening. Sometimes B vitamins can help with energy so taking in the morning may help.    4. Airport tips for [employer] [place] (below) - you may want to call [place] to see if the sensors can go through your luggage/in the scanner - [phone]. If you end up wearing a sensor it could damage the sensor going through the full body scanner/luggage scan and may produce inaccurate results - you may need to consider going without during your trip so you don't waste them.      You may use your System while on an aircraft, following any requests from the flight crew.    Some airport full-body scanners include x-ray or millimeter radio-wave, which you cannot expose your Sensor to. The effect of these scanners has not been evaluated and the exposure may damage the Sensor or cause inaccurate results. To avoid removing your Sensor, you may request another type of screening. If you do  choose to go through a full-body scanner, you must remove your Sensor.  The Sensor can be exposed to common electrostatic (ESD) and electromagnetic interference (FRANCISCO JAVIER), including airport metal detectors. You can keep your [place] on while going through these.    Follow-up: 1 year or sooner if needed    SUBJECTIVE/OBJECTIVE:                          Charlotte Snow is a 82 year old female coming in for an initial visit. She was referred to me from Dr. Villa.      Reason for visit: Comprehensive medication review.    Allergies/ADRs: Reviewed in chart  Past Medical History: Reviewed in chart  Tobacco: She reports that she has never smoked. She has never used smokeless tobacco.  Alcohol: none      Medication Adherence/Access: no issues reported    Type 2 Diabetes:  Currently taking metformin XR 1125 mg daily (she has been taking one and one-half metformin  mg tablets - previously on XR 1500 mg daily but had gas). Does take gabapentin 300 mg three times daily for neuropathy with some relief. Patient is not experiencing side effects.  Blood sugar monitoring: Continuous Glucose Monitor. Ranges (patient reported):     Symptoms of low blood sugar? none  Symptoms of high blood sugar? none  Eye exam: due  Foot exam: due  Diet/Exercise: has really focused on her diet and activity (as able with injury).   Aspirin: Not taking due to Plavix  Statin: Yes: atorvastatin   ACEi/ARB: No.   Urine Albumin:   Lab Results   Component Value Date    UMALCR 19.74 02/15/2022      Lab Results   Component Value Date    A1C 11.3 11/09/2022    A1C 6.8 02/15/2022    A1C 6.7 01/14/2021    A1C 6.5 09/29/2019    A1C 7.2 04/22/2019    A1C 6.8 06/04/2018    A1C 6.8 10/21/2016       History of stroke/Hyperlipidemia: Current therapy includes clopidogrel 75 mg daily and  atorvastatin 40 mg daily.  Patient reports no significant myalgias or other side effects.  The ASCVD Risk score ([place] DK, et al., 2019) failed to calculate for the following  reasons:    The 2019 ASCVD risk score is only valid for ages 40 to 79    The patient has a prior MI or stroke diagnosis  Recent Labs   Lab Test 02/15/22  1026 01/14/21  1026 08/05/16  1403 08/19/15  0818 06/08/15  0810   CHOL 130 129   < > 194 161   HDL 73 72   < > 77 75   LDL 36 41   < > 97 71   TRIG 104 78   < > 102 77   CHOLHDLRATIO  --   --   --  2.5 2.1    < > = values in this interval not displayed.     BP Readings from Last 3 Encounters:   03/27/23 134/78   12/14/22 136/74   07/21/22 132/66     GERD: Current medications include: Protonix (pantoprazole) 40 mg once daily. Patient reports no current symptoms.  Patient feels that current regimen is effective.    Recurrent UTI: Patient is using estradiol vaginal cream twice weekly and cranberry 500 mg twice daily. Has not had issues with urinary tract infections for some time. Denies any known side effects.    Supplements: Patient is taking vitamin B complex daily, vitamin C 500 mg daily, vitamin D3 100 mcg daily, probiotic daily, a daily multivitamin, and glucosamine/chondroitin twice daily. Denies any issues.    Today's Vitals: /78   Wt 144 lb (65.3 kg)   BMI 25.51 kg/m       Wt Readings from Last 5 Encounters:   03/27/23 144 lb (65.3 kg)   12/14/22 155 lb (70.3 kg)   07/21/22 165 lb (74.8 kg)   07/14/22 165 lb (74.8 kg)   07/13/22 165 lb (74.8 kg)     ----------------      I spent 50 minutes with this patient today. All changes were made via collaborative practice agreement with Aron Villa DO. A copy of the visit note was provided to the patient's provider(s).    A summary of these recommendations was given to the patient.    Virgil Morgan, PharmD, BCACP  Medication Therapy Management Pharmacist  Pager: [phone]     Medication Therapy Recommendations  No medication therapy recommendations to display

## 2023-03-27 NOTE — PROGRESS NOTES
AUDIOLOGY REPORT - Hearing Aid Check     SUBJECTIVE:  Charlotte Snow is a 82 year old year old female, seen today [facility] Previous evaluation 1/24/2023 showed mild to severe sensorineural hearing loss bilaterally. The patient is currently using binaural Jennifer Halo i90 ERIC hearing aids. She reported the right device is non-functioning.     OBJECTIVE:  Removed dome, and replaced filter. Function restored, removed silicone wax protection from the small closed dome.     PLAN:   Returned hearing aid to Charlotte. Advised she will be contacted when the custom earmolds arrive for fitting and hearing aid programming as needed.     Cecilia Ordonez.  [place] Licensed Audiologist #[id number]

## 2023-03-27 NOTE — TELEPHONE ENCOUNTER
Reason for Call:  Appointment Request    Patient requesting this type of appt:  Preventive     Requested provider: Aron Villa    Reason patient unable to be scheduled: Not within requested timeframe    When does patient want to be seen/preferred time: before April 18th    Comments: wellness check before 4/18/23 if possible    Could we send this information to you in [employer] or would you prefer to receive a phone call?:   Patient would prefer a phone call   Okay to leave a detailed message?: Yes at Cell number on file:    Telephone Information:   Mobile [phone]       Call taken on 3/27/2023 at 5:49 PM by Luisa Emanuel

## 2023-03-30 VITALS — WEIGHT: 144 LBS | BODY MASS INDEX: 25.51 KG/M2 | DIASTOLIC BLOOD PRESSURE: 78 MMHG | SYSTOLIC BLOOD PRESSURE: 134 MMHG

## 2023-03-31 ENCOUNTER — TELEPHONE (OUTPATIENT)
Dept: PHARMACY | Facility: CLINIC | Age: 83
End: 2023-03-31
Payer: COMMERCIAL

## 2023-04-11 DIAGNOSIS — E11.42 TYPE 2 DIABETES MELLITUS WITH DIABETIC POLYNEUROPATHY, WITHOUT LONG-TERM CURRENT USE OF INSULIN (H): ICD-10-CM

## 2023-04-11 NOTE — TELEPHONE ENCOUNTER
Requested Prescriptions   Pending Prescriptions Disp Refills    Continuous Blood Gluc Sensor (FREESTYLE JUN 3 SENSOR) MISC [Pharmacy Med Name: FREESTYLE JUN 3 SENSOR KIT]  0     Sig: USE 1  EVERY TWO WEEKS (14  DAYS)       There is no refill protocol information for this order

## 2023-04-12 ENCOUNTER — TELEPHONE (OUTPATIENT)
Dept: AUDIOLOGY | Facility: CLINIC | Age: 83
End: 2023-04-12
Payer: COMMERCIAL

## 2023-04-12 RX ORDER — BLOOD-GLUCOSE SENSOR
EACH MISCELLANEOUS
Qty: 2 EACH | Refills: 0 | Status: SHIPPED | OUTPATIENT
Start: 2023-04-12 | End: 2023-05-31

## 2023-04-12 NOTE — TELEPHONE ENCOUNTER
[facility] Call Center    Phone Message    May a detailed message be left on voicemail: yes     Reason for Call: Other: Patient called inqurying about ear molds wanting to know if there is any update. Patient is going to [place] next tuesday for a few weeks to visit her son and wondered if they may be in by then. Thank you      Action Taken: Other: AUD    Travel Screening: Not Applicable

## 2023-04-17 ENCOUNTER — OFFICE VISIT (OUTPATIENT)
Dept: AUDIOLOGY | Facility: CLINIC | Age: 83
End: 2023-04-17
Payer: COMMERCIAL

## 2023-04-17 DIAGNOSIS — H90.3 SENSORINEURAL HEARING LOSS, BILATERAL: Primary | ICD-10-CM

## 2023-04-17 PROCEDURE — 92593 PR HEARING AID CHECK, BINAURAL: CPT | Performed by: AUDIOLOGIST

## 2023-04-17 PROCEDURE — V5264 EAR MOLD/INSERT: HCPCS | Mod: RT | Performed by: AUDIOLOGIST

## 2023-04-17 PROCEDURE — 99207 PR NO CHARGE LOS: CPT | Performed by: AUDIOLOGIST

## 2023-04-17 NOTE — PROGRESS NOTES
AUDIOLOGY REPORT - Hearing Aid Programming     SUBJECTIVE:  Charlotte Snow is a 82 year old year old female, seen today to fit new /60 custom receivers and to complete hearing aid programming at [facility]. Previous evaluation 1/24/2023 showed mild to severe sensorineural hearing loss bilaterally. The patient is currently using binaural Jennifer Halo i90 ERIC hearing aids. She reported she sometimes struggles to hear clearly, conversation and on the phone.     OBJECTIVE:  Otoscopic examination showed clear canals and visible landmarks bilaterally. Hearing aids were connected to programming software and a new fit was created. Hearing aids were adjusted to match NAL-NL2 prescriptive targets. Real ear measures were used to verify output of the hearing aids. There was an excellent match to target from 500 Hz to 4000 Hz for the right and left hearing aids. Charlotte reported the devices were balanced and speech was clear. Reviewed insertion of the custom  mold.      PLAN:   Follow up was scheduled 5/11/2023.     Mynor Ordonez

## 2023-05-09 ENCOUNTER — OFFICE VISIT (OUTPATIENT)
Dept: INTERNAL MEDICINE | Facility: CLINIC | Age: 83
End: 2023-05-09
Payer: COMMERCIAL

## 2023-05-09 VITALS
WEIGHT: 134 LBS | RESPIRATION RATE: 16 BRPM | TEMPERATURE: 98.4 F | HEIGHT: 63 IN | HEART RATE: 66 BPM | DIASTOLIC BLOOD PRESSURE: 66 MMHG | SYSTOLIC BLOOD PRESSURE: 128 MMHG | BODY MASS INDEX: 23.74 KG/M2 | OXYGEN SATURATION: 99 %

## 2023-05-09 DIAGNOSIS — E11.9 TYPE 2 DIABETES MELLITUS WITHOUT COMPLICATION, WITHOUT LONG-TERM CURRENT USE OF INSULIN (H): ICD-10-CM

## 2023-05-09 DIAGNOSIS — N18.2 CKD (CHRONIC KIDNEY DISEASE) STAGE 2, GFR 60-89 ML/MIN: ICD-10-CM

## 2023-05-09 DIAGNOSIS — Z00.00 MEDICARE ANNUAL WELLNESS VISIT, SUBSEQUENT: Primary | ICD-10-CM

## 2023-05-09 DIAGNOSIS — E11.42 DIABETIC POLYNEUROPATHY ASSOCIATED WITH TYPE 2 DIABETES MELLITUS (H): ICD-10-CM

## 2023-05-09 DIAGNOSIS — E78.5 HYPERLIPIDEMIA LDL GOAL <100: ICD-10-CM

## 2023-05-09 PROBLEM — I47.10 SVT (SUPRAVENTRICULAR TACHYCARDIA) (H): Status: RESOLVED | Noted: 2020-02-19 | Resolved: 2023-05-09

## 2023-05-09 LAB
ANION GAP SERPL CALCULATED.3IONS-SCNC: 11 MMOL/L (ref 7–15)
BUN SERPL-MCNC: 18.8 MG/DL (ref 8–23)
CALCIUM SERPL-MCNC: 9.5 MG/DL (ref 8.8–10.2)
CHLORIDE SERPL-SCNC: 99 MMOL/L (ref 98–107)
CHOLEST SERPL-MCNC: 162 MG/DL
CREAT SERPL-MCNC: 0.72 MG/DL (ref 0.51–0.95)
DEPRECATED HCO3 PLAS-SCNC: 27 MMOL/L (ref 22–29)
GFR SERPL CREATININE-BSD FRML MDRD: 83 ML/MIN/1.73M2
GLUCOSE SERPL-MCNC: 136 MG/DL (ref 70–99)
HBA1C MFR BLD: 7.1 %
HDLC SERPL-MCNC: 72 MG/DL
LDLC SERPL CALC-MCNC: 77 MG/DL
NONHDLC SERPL-MCNC: 90 MG/DL
POTASSIUM SERPL-SCNC: 4.1 MMOL/L (ref 3.4–5.3)
SODIUM SERPL-SCNC: 137 MMOL/L (ref 136–145)
TRIGL SERPL-MCNC: 63 MG/DL

## 2023-05-09 PROCEDURE — 80061 LIPID PANEL: CPT | Performed by: INTERNAL MEDICINE

## 2023-05-09 PROCEDURE — 36415 COLL VENOUS BLD VENIPUNCTURE: CPT | Performed by: INTERNAL MEDICINE

## 2023-05-09 PROCEDURE — 99207 PR FOOT EXAM NO CHARGE: CPT | Performed by: INTERNAL MEDICINE

## 2023-05-09 PROCEDURE — 83036 HEMOGLOBIN GLYCOSYLATED A1C: CPT | Performed by: INTERNAL MEDICINE

## 2023-05-09 PROCEDURE — 80048 BASIC METABOLIC PNL TOTAL CA: CPT | Performed by: INTERNAL MEDICINE

## 2023-05-09 PROCEDURE — G0439 PPPS, SUBSEQ VISIT: HCPCS | Performed by: INTERNAL MEDICINE

## 2023-05-09 PROCEDURE — 99214 OFFICE O/P EST MOD 30 MIN: CPT | Mod: 25 | Performed by: INTERNAL MEDICINE

## 2023-05-09 RX ORDER — METFORMIN HCL 500 MG
500 TABLET, EXTENDED RELEASE 24 HR ORAL
Qty: 180 TABLET | Refills: 1 | COMMUNITY
Start: 2023-05-09 | End: 2023-08-09

## 2023-05-09 ASSESSMENT — ENCOUNTER SYMPTOMS
MYALGIAS: 0
HEADACHES: 0
DYSURIA: 0
SHORTNESS OF BREATH: 0
EYE PAIN: 0
HEMATURIA: 0
ARTHRALGIAS: 0
SORE THROAT: 0
NERVOUS/ANXIOUS: 0
NAUSEA: 0
FEVER: 0
BREAST MASS: 0
ABDOMINAL PAIN: 0
HEMATOCHEZIA: 0
CHILLS: 0
JOINT SWELLING: 0
HEARTBURN: 0
COUGH: 0
FREQUENCY: 0
CONSTIPATION: 0
DIZZINESS: 0
PALPITATIONS: 0
DIARRHEA: 0
PARESTHESIAS: 0
WEAKNESS: 0

## 2023-05-09 ASSESSMENT — ACTIVITIES OF DAILY LIVING (ADL): CURRENT_FUNCTION: NO ASSISTANCE NEEDED

## 2023-05-09 NOTE — PROGRESS NOTES
"SUBJECTIVE:   Charlotte is a 83 year old who presents for Preventive Visit.      5/9/2023    10:07 AM   Additional Questions   Roomed by Lakisha Gillette   Patient has been advised of split billing requirements and indicates understanding: Yes  Are you in the first 12 months of your Medicare coverage?  No    Healthy Habits:     In general, how would you rate your overall health?  Good    Frequency of exercise:  4-5 days/week    Duration of exercise:  30-45 minutes    Do you usually eat at least 4 servings of fruit and vegetables a day, include whole grains    & fiber and avoid regularly eating high fat or \"junk\" foods?  Yes    Taking medications regularly:  Yes    Medication side effects:  None    Ability to successfully perform activities of daily living:  No assistance needed    Home Safety:  No safety concerns identified    Hearing Impairment:  Difficulty following a conversation in a noisy restaurant or crowded room, difficulty following dialogue in the theater, need to ask people to speak up or repeat themselves, find that men's voices are easier to understand than woman's and difficulty understanding soft or whispered speech    In the past 6 months, have you been bothered by leaking of urine?  No    In general, how would you rate your overall mental or emotional health?  Excellent      PHQ-2 Total Score: 0    Additional concerns today:  No      Have you ever done Advance Care Planning? (For example, a Health Directive, POLST, or a discussion with a medical provider or your loved ones about your wishes): Yes, advance care planning is on file.       Fall risk  Fallen 2 or more times in the past year?: No  Any fall with injury in the past year?: No    Cognitive Screening   1) Repeat 3 items (Leader, Season, Table)    2) Clock draw: NORMAL  3) 3 item recall: Recalls 3 objects  Results: 3 items recalled: COGNITIVE IMPAIRMENT LESS LIKELY    Mini-CogTM Copyright S [employer]. Licensed by the author for use in [facility]; reprinted with permission (soob@[facility]). All rights reserved.      Do you have sleep apnea, excessive snoring or daytime drowsiness?: no    Reviewed and updated as needed this visit by clinical staff   Tobacco   Meds              Reviewed and updated as needed this visit by Provider                 Social History     Tobacco Use     Smoking status: Never     Smokeless tobacco: Never   Vaping Use     Vaping status: Never Used     Passive vaping exposure: Yes   Substance Use Topics     Alcohol use: No     Alcohol/week: 0.0 standard drinks of alcohol             5/9/2023     9:47 AM   Alcohol Use   Prescreen: >3 drinks/day or >7 drinks/week? Not Applicable     Do you have a current opioid prescription? No  Do you use any other controlled substances or medications that are not prescribed by a provider? None              Current providers sharing in care for this patient include:   Patient Care Team:  Aron Villa DO as PCP - General (Internal Medicine)  Aron Villa DO as Assigned PCP  Yaneth Linder MD as Assigned OBGYN Provider  Nikolas Pinzon DPM as Assigned Surgical Provider  Jessica Rosales AuD as Audiologist (Audiology)  Wilmer Morgan [facility] as Assigned MTM Pharmacist    The following health maintenance items are reviewed in Epic and correct as of today:  Health Maintenance   Topic Date Due     ZOSTER IMMUNIZATION (2 of 3) 02/06/2014     MEDICARE ANNUAL WELLNESS VISIT  10/21/2020     DIABETIC FOOT EXAM  11/14/2020     COVID-19 Vaccine (4 - Booster for Moderna series) 12/24/2021     EYE EXAM  01/01/2023     BMP  02/15/2023     LIPID  02/15/2023     MICROALBUMIN  02/15/2023     ANNUAL REVIEW OF HM ORDERS  03/17/2023     A1C  05/09/2023     FALL RISK ASSESSMENT  05/09/2024     ADVANCE CARE PLANNING  12/27/2026     COLORECTAL CANCER SCREENING  02/17/2027     DEXA  04/17/2028     DTAP/TDAP/TD IMMUNIZATION (2 - Td or Tdap) 03/17/2032     PHQ-2  (once per calendar year)  Completed     INFLUENZA VACCINE  Completed     Pneumococcal Vaccine: 65+ Years  Completed     URINALYSIS  Completed     IPV IMMUNIZATION  Aged Out     MENINGITIS IMMUNIZATION  Aged Out     Lab work is in process  BP Readings from Last 3 Encounters:   05/09/23 128/66   03/27/23 134/78   12/14/22 136/74    Wt Readings from Last 3 Encounters:   05/09/23 60.8 kg (134 lb)   03/27/23 65.3 kg (144 lb)   12/14/22 70.3 kg (155 lb)                  Patient Active Problem List   Diagnosis     Myalgia and myositis     Degenerative arthritis of thumb     Hyperlipidemia LDL goal <100     ACP (advance care planning)     Right hip pain s/p fracture and subsequent ORIF     Constipation     OA (osteoarthritis) of knee - right     Microalbuminuria     Shingles     History of herpes zoster     Toxic effect of toluene     Peripheral neuropathy     CKD (chronic kidney disease) stage 2, GFR 60-89 ml/min     Diabetic polyneuropathy associated with type 2 diabetes mellitus (H)     Closed fracture of distal end of left radius, unspecified fracture morphology, initial encounter     Left-sided low back pain with left-sided sciatica     Nonallopathic lesion of sacroiliac region     Somatic dysfunction of lumbar region     TIA (transient ischemic attack)     Past Surgical History:   Procedure Laterality Date     COLONOSCOPY  09/24/07     COLONOSCOPY  11/30/2010    COMBINED COLONOSCOPY, SINGLE BIOPSY/POLYPECTOMY BY BIOPSY performed by CHINO ADKINS at  GI     COLONOSCOPY N/A 12/7/2016    Procedure: COMBINED COLONOSCOPY, SINGLE OR MULTIPLE BIOPSY/POLYPECTOMY BY BIOPSY;  Surgeon: Chino Adkins MD;  Location:  GI     COLONOSCOPY N/A 2/17/2022    Procedure: COLONOSCOPY WITH POLYPECTOMY;  Surgeon: Deven Sanchez DO;  Location:  GI     ESOPHAGOSCOPY, GASTROSCOPY, DUODENOSCOPY (EGD), COMBINED N/A 2/17/2022    Procedure: ESOPHAGOGASTRODUODENOSCOPY (EGD) with biopsies;  Surgeon: Deven Sacnhez  DO Toro;  Location: [facility]     HC REMOVAL OF TONSILS,<12 Y/O      Tonsils <13y.o.     HC REPAIR OF HAMMERTOE,ONE  8/20/2004    Arthroplasties, 4th and 5th digits left foot, with excision of painful keratoma distal medial aspect of 5th digit.     LAPAROSCOPIC CHOLECYSTECTOMY  8/13/2012    Procedure: LAPAROSCOPIC CHOLECYSTECTOMY;  Laparoscopic Cholectectomy;  Surgeon: Naseem Hollis MD;  Location: [facility]     OPEN REDUCTION INTERNAL FIXATION HIP  12/17/2012    Procedure: OPEN REDUCTION INTERNAL FIXATION HIP;  open reduction internal fixation hip, long gamma isabella;  Surgeon: Andrew Thorne MD;  Location: [facility]     OPEN REDUCTION INTERNAL FIXATION TIBIAL PLATEAU  08/25/10    R tibial plateau fx/open reduction internal fixation     OPEN REDUCTION INTERNAL FIXATION WRIST Left 4/8/2016    Procedure: OPEN REDUCTION INTERNAL FIXATION WRIST;  Surgeon: Rojelio Arzate MD;  Location: [facility]     PHACOEMULSIFICATION CLEAR CORNEA WITH STANDARD INTRAOCULAR LENS IMPLANT  5/22/2012    Procedure:PHACOEMULSIFICATION CLEAR CORNEA WITH STANDARD INTRAOCULAR LENS IMPLANT; RIGHT PHACOEMULSIFICATION CLEAR CORNEA WITH STANDARD INTRAOCULAR LENS IMPLANT ; Surgeon:EULOGIO CASTILLO; Location:[facility]     PHACOEMULSIFICATION CLEAR CORNEA WITH STANDARD INTRAOCULAR LENS IMPLANT  6/14/2012    Procedure: PHACOEMULSIFICATION CLEAR CORNEA WITH STANDARD INTRAOCULAR LENS IMPLANT;  LEFT PHACOEMULSIFICATION CLEAR CORNEA WITH STANDARD INTRAOCULAR LENS IMPLANT ;  Surgeon: Eulogio Castillo MD;  Location:  [facility]     ZZC APPENDECTOMY       ZZC OPEN FIX INTER/SUBTROCH FX,IMPLNT  12/17/12    Right, Gamma     ZZC TOTAL ABDOM HYSTERECTOMY      Hysterectomy, Total Abdominal     ZZHC COLONOSCOPY W/WO BRUSH/WASH  09/13/2004    If path reveals adenomatous tissue, then repeat colonoscopy in 3 years.       Social History     Tobacco Use     Smoking status: Never     Smokeless tobacco: Never   Vaping Use     Vaping status: Never Used     Passive  vaping exposure: Yes   Substance Use Topics     Alcohol use: No     Alcohol/week: 0.0 standard drinks of alcohol     Family History   Problem Relation Age of Onset     Cancer Mother         Abdominal     Diabetes Mother      Colon Cancer Mother      Diabetes Maternal Grandmother      Circulatory Father         Hardening of the arteries     Hypertension Sister      Cancer - colorectal Sister      Colon Cancer Sister      Thyroid Disease Sister      Hypertension Sister      Allergies Daughter      Allergies Son      Diabetes Maternal Aunt      Diabetes Maternal Uncle          Current Outpatient Medications   Medication Sig Dispense Refill     acetaminophen (TYLENOL) 500 MG tablet Take 500-1,000 mg by mouth every 6 hours as needed for mild pain       atorvastatin (LIPITOR) 40 MG tablet TAKE 1 TABLET EVERY EVENING 90 tablet 0     biotin 5 MG CAPS Take 1 capsule by mouth every other day  30 capsule      Calcium Carbonate-Vitamin D (CALCIUM 600 + D OR) Take 1 tablet by mouth daily        clopidogrel (PLAVIX) 75 MG tablet TAKE 1 TABLET EVERY DAY 90 tablet 3     Continuous Blood Gluc Sensor (FREESTYLE JUN 3 SENSOR) MISC USE 1  EVERY TWO WEEKS (14  DAYS) 2 each 0     Cranberry 500 MG CAPS Take 1 capsule by mouth 2 times daily       estradiol (ESTRACE) 0.1 MG/GM vaginal cream Place 1 g vaginally twice a week 42.5 g 11     gabapentin (NEURONTIN) 300 MG capsule TAKE 1 CAPSULE THREE TIMES DAILY 270 capsule 3     glucosamine-chondroitin 500-400 MG CAPS per capsule Take 1 capsule by mouth 2 times daily       metFORMIN (GLUCOPHAGE XR) 500 MG 24 hr tablet Take 1 tablet (500 mg) by mouth daily (with dinner) 180 tablet 1     MULTI VIT/FL OR Take 1 tablet by mouth daily        pantoprazole (PROTONIX) 40 MG EC tablet TAKE 1 TABLET EVERY DAY 90 tablet 1     Probiotic Product (PROBIOTIC DAILY) CAPS Take 2 capsules by mouth daily       TRUE METRIX BLOOD GLUCOSE TEST test strip USE TO TEST BLOOD SUGARS TWICE DAILY OR AS DIRECTED 100 strip  3     vitamin B-Complex Take 1 tablet by mouth every evening       vitamin C (ASCORBIC ACID) 1000 MG TABS Take 500 mg by mouth daily       Vitamin D3 50 mcg (2000 units) tablet Take 2 tablets by mouth daily       EPIPEN 2-HAYLEE 0.3 MG/0.3ML injection 2-pack INJECT 0.3 MLS (0.3 MG) INTO THE MUSCLE ONCE AS NEEDED FOR ANAPHYLAXIS (Patient not taking: Reported on 5/9/2023) 0.6 mL 3     Allergies   Allergen Reactions     Ace Inhibitors      Prinivil made her cough     Codeine      Losartan Potassium Hives     Cozaar     Sulfa Antibiotics Itching         Mammogram Screening - Patient over age 75, has elected to continue with screening.  Pertinent mammograms are reviewed under the imaging tab.    Review of Systems   Constitutional: Negative for chills and fever.   HENT: Positive for ear pain and hearing loss. Negative for congestion and sore throat.    Eyes: Negative for pain and visual disturbance.   Respiratory: Negative for cough and shortness of breath.    Cardiovascular: Negative for chest pain, palpitations and peripheral edema.   Gastrointestinal: Negative for abdominal pain, constipation, diarrhea, heartburn, hematochezia and nausea.   Breasts:  Negative for tenderness, breast mass and discharge.   Genitourinary: Positive for vaginal discharge. Negative for dysuria, frequency, genital sores, hematuria, pelvic pain, urgency and vaginal bleeding.   Musculoskeletal: Negative for arthralgias, joint swelling and myalgias.   Skin: Negative for rash.   Neurological: Negative for dizziness, weakness, headaches and paresthesias.   Psychiatric/Behavioral: Negative for mood changes. The patient is not nervous/anxious.      Patient recently had some diarrhea and took Pepto-Bismol.  Patient developed black stools and went to the urgent care.  Hemoglobin was apparently within normal limits.  Patient's last colonoscopy was about a year ago and demonstrated some diverticular disease and internal hemorrhoids.  No malignancy was  "noted.    Patient occasionally has some ear discomfort but not current.  Chronic hearing loss is modest    OBJECTIVE:   /66   Pulse 66   Temp 98.4  F (36.9  C) (Temporal)   Resp 16   Ht 1.6 m (5' 3\")   Wt 60.8 kg (134 lb)   SpO2 99%   BMI 23.74 kg/m   Estimated body mass index is 23.74 kg/m  as calculated from the following:    Height as of this encounter: 1.6 m (5' 3\").    Weight as of this encounter: 60.8 kg (134 lb).  Physical Exam  GENERAL APPEARANCE: healthy, alert and no distress  EYES: Eyes grossly normal to inspection, PERRL and conjunctivae and sclerae normal  HENT: ear canals and TM's normal, nose and mouth without ulcers or lesions, oropharynx clear and oral mucous membranes moist  NECK: no adenopathy, no asymmetry, masses, or scars and thyroid normal to palpation  RESP: lungs clear to auscultation - no rales, rhonchi or wheezes  CV: regular rate and rhythm, normal S1 S2, no S3 or S4, no murmur, click or rub, no peripheral edema and peripheral pulses strong  ABDOMEN: soft, nontender, no hepatosplenomegaly, no masses and bowel sounds normal  MS: no musculoskeletal defects are noted and gait is age appropriate without ataxia  SKIN: no suspicious lesions or rashes  NEURO: Normal strength and tone, sensory exam grossly normal, mentation intact and speech normal  PSYCH: mentation appears normal and affect normal/bright    Diagnostic Test Results:  Labs reviewed in Epic  No results found for this or any previous visit (from the past 24 hour(s)).    ASSESSMENT / PLAN:       ICD-10-CM    1. Medicare annual wellness visit, subsequent  Z00.00       2. Type 2 diabetes mellitus without complication, without long-term current use of insulin (H)  E11.9 metFORMIN (GLUCOPHAGE XR) 500 MG 24 hr tablet     FOOT EXAM      3. Diabetic polyneuropathy associated with type 2 diabetes mellitus (H)  E11.42 Lipid panel reflex to direct LDL Non-fasting     HEMOGLOBIN A1C      4. CKD (chronic kidney disease) stage 2, GFR " 60-89 ml/min  N18.2 BASIC METABOLIC PANEL     Albumin Random Urine Quantitative with Creat Ratio      5. Hyperlipidemia LDL goal <100  E78.5       Patient has had substantial improvement in her blood sugar.  Her continuous glucose monitor suggest an A1c of about 7.  She has substantially changed her diet and lost about 30 pounds intentionally.  She is exercising regularly every day.    Patient has been advised of split billing requirements and indicates understanding: Yes      COUNSELING:  Reviewed preventive health counseling, as reflected in patient instructions       Regular exercise       Healthy diet/nutrition       Vision screening       Hearing screening        She reports that she has never smoked. She has never used smokeless tobacco.      Appropriate preventive services were discussed with this patient, including applicable screening as appropriate for cardiovascular disease, diabetes, osteopenia/osteoporosis, and glaucoma.  As appropriate for age/gender, discussed screening for colorectal cancer, prostate cancer, breast cancer, and cervical cancer. Checklist reviewing preventive services available has been given to the patient.    Reviewed patients plan of care and provided an AVS. The Basic Care Plan (routine screening as documented in Health Maintenance) for Charlotte meets the Care Plan requirement. This Care Plan has been established and reviewed with the Patient.        I have reviewed the patient's vaccination schedule and discussed the benefits of prophylactic vaccination in detail.  I recommend the patient contact their pharmacist for vaccinations.  Discussed that most insurance companies now favor reimbursement to the pharmacies and it will financially behoove the patient to have vaccinations performed at their pharmacy.      Aron Villa DO  [facility]    Identified Health Risks:    I have reviewed Opioid Use Disorder and Substance Use Disorder risk factors and  made any needed referrals.

## 2023-05-10 ENCOUNTER — NURSE TRIAGE (OUTPATIENT)
Dept: INTERNAL MEDICINE | Facility: CLINIC | Age: 83
End: 2023-05-10
Payer: COMMERCIAL

## 2023-05-10 ENCOUNTER — MYC MEDICAL ADVICE (OUTPATIENT)
Dept: INTERNAL MEDICINE | Facility: CLINIC | Age: 83
End: 2023-05-10
Payer: COMMERCIAL

## 2023-05-10 DIAGNOSIS — N30.00 ACUTE CYSTITIS WITHOUT HEMATURIA: Primary | ICD-10-CM

## 2023-05-10 RX ORDER — NITROFURANTOIN 25; 75 MG/1; MG/1
100 CAPSULE ORAL 2 TIMES DAILY
Qty: 10 CAPSULE | Refills: 0 | Status: SHIPPED | OUTPATIENT
Start: 2023-05-10

## 2023-05-10 NOTE — TELEPHONE ENCOUNTER
"Patient reports having 8 loose/watery stools since 8am this morning (now 1145am).  She states that it has now stopped and she is feeling fine.    She has stopped taking the Pepto as recommended at her appointment yesterday.    She is wondering what to do today?  Is there a medication she can take?    Message handled by Nurse Triage with [facility] - provider name: Dr. Villa.  He recommends Imodium for loose stools (follow directions on the bottle) and if loose stools continue until Monday, then will order stool cultures.  Patient informed.    Reason for Disposition    SEVERE diarrhea (e.g., 7 or more times / day more than normal) and age > 60 years    Additional Information    Negative: Shock suspected (e.g., cold/pale/clammy skin, too weak to stand, low BP, rapid pulse)    Negative: Difficult to awaken or acting confused (e.g., disoriented, slurred speech)    Negative: Sounds like a life-threatening emergency to the triager    Negative: Vomiting also present and worse than the diarrhea    Negative: Blood in stool and without diarrhea    Negative: SEVERE abdominal pain (e.g., excruciating) and present > 1 hour    Negative: SEVERE abdominal pain and age > 60 years    Negative: Bloody, black, or tarry bowel movements (Exception: chronic-unchanged black-grey bowel movements and is taking iron pills or Pepto-Bismol)    Answer Assessment - Initial Assessment Questions  1. DIARRHEA SEVERITY: \"How bad is the diarrhea?\" \"How many more stools have you had in the past 24 hours than normal?\"     - NO DIARRHEA (SCALE 0)    - MILD (SCALE 1-3): Few loose or mushy BMs; increase of 1-3 stools over normal daily number of stools; mild increase in ostomy output.    -  MODERATE (SCALE 4-7): Increase of 4-6 stools daily over normal; moderate increase in ostomy output.  * SEVERE (SCALE 8-10; OR 'WORST POSSIBLE'): Increase of 7 or more stools daily over normal; moderate increase in ostomy output; incontinence.      Last 4 hours has had 8 " "loose stools    2. ONSET: \"When did the diarrhea begin?\"       This morning at 8am 05/10/23    3. BM CONSISTENCY: \"How loose or watery is the diarrhea?\"       Both loose and watery    4. VOMITING: \"Are you also vomiting?\" If Yes, ask: \"How many times in the past 24 hours?\"       No    5. ABDOMINAL PAIN: \"Are you having any abdominal pain?\" If Yes, ask: \"What does it feel like?\" (e.g., crampy, dull, intermittent, constant)       No pain or cramping    6. ABDOMINAL PAIN SEVERITY: If present, ask: \"How bad is the pain?\"  (e.g., Scale 1-10; mild, moderate, or severe)    - MILD (1-3): doesn't interfere with normal activities, abdomen soft and not tender to touch     - MODERATE (4-7): interferes with normal activities or awakens from sleep, abdomen tender to touch     - SEVERE (8-10): excruciating pain, doubled over, unable to do any normal activities        N/A    7. ORAL INTAKE: If vomiting, \"Have you been able to drink liquids?\" \"How much liquids have you had in the past 24 hours?\"      Getting lots a fluids in and states she is \"almost floating away\"    8. HYDRATION: \"Any signs of dehydration?\" (e.g., dry mouth [not just dry lips], too weak to stand, dizziness, new weight loss) \"When did you last urinate?\"      Urinates everytime she goes to the bathroom - 1 hour ago (1140am)    9. EXPOSURE: \"Have you traveled to a foreign country recently?\" \"Have you been exposed to anyone with diarrhea?\" \"Could you have eaten any food that was spoiled?\"      Neighbors have had diarrhea, has traveled [place] on the plane and back - has been home a week, careful about spoiled food/ does not think so    10. ANTIBIOTIC USE: \"Are you taking antibiotics now or have you taken antibiotics in the past 2 months?\"        No    11. OTHER SYMPTOMS: \"Do you have any other symptoms?\" (e.g., fever, blood in stool)        No black stools, has the chills, no fever (97.1)    Protocols used: DIARRHEA-A-OH      "

## 2023-05-11 ENCOUNTER — OFFICE VISIT (OUTPATIENT)
Dept: AUDIOLOGY | Facility: CLINIC | Age: 83
End: 2023-05-11
Payer: COMMERCIAL

## 2023-05-11 DIAGNOSIS — H90.6 MIXED CONDUCTIVE AND SENSORINEURAL HEARING LOSS OF BOTH EARS: ICD-10-CM

## 2023-05-11 DIAGNOSIS — H90.3 SENSORINEURAL HEARING LOSS, BILATERAL: Primary | ICD-10-CM

## 2023-05-11 PROCEDURE — V5010 ASSESSMENT FOR HEARING AID: HCPCS | Performed by: AUDIOLOGIST

## 2023-05-11 PROCEDURE — 99207 PR NO CHARGE LOS: CPT | Performed by: AUDIOLOGIST

## 2023-05-11 NOTE — PROGRESS NOTES
AUDIOLOGY REPORT - Hearing Aid Programming     SUBJECTIVE:  Charlotte Snow is a 83 year old year old female, seen todayfor follow up after fitting new /60 custom receivers and to complete hearing aid programming at [facility]. Previous evaluation 1/24/2023 showed mild to severe sensorineural hearing loss bilaterally. The patient is currently using binaural Jennifer Halo i90 ERIC hearing aids. She reported she is hearing better, but has very sore right ear and some soreness left ear.     OBJECTIVE:  Otoscopic examination showed visible irritation right, some left. Modified right mold to taper tip and smooth bulges. No modifications left, worked on insertion. The mold needs to fit down to the entrance of her ear canal.  She should not wear the right device until that ear does not hurt any more.     PLAN:   Return when the right ear is healed for follow up.     Cecilia Ordonez.

## 2023-05-12 ENCOUNTER — TELEPHONE (OUTPATIENT)
Dept: PHARMACY | Facility: CLINIC | Age: 83
End: 2023-05-12
Payer: COMMERCIAL

## 2023-05-12 NOTE — TELEPHONE ENCOUNTER
Checking on recent changes to patient's medication and recent MTM visit. No answer. Left voicemail with pager number for patient to leave message.      Virgil Morgan, ChantelD, [facility] Care CenterCP  Medication Therapy Management Pharmacist  Pager: [phone]

## 2023-05-23 ENCOUNTER — OFFICE VISIT (OUTPATIENT)
Dept: AUDIOLOGY | Facility: CLINIC | Age: 83
End: 2023-05-23

## 2023-05-23 DIAGNOSIS — H90.3 SENSORINEURAL HEARING LOSS, BILATERAL: Primary | ICD-10-CM

## 2023-05-23 PROCEDURE — V5010 ASSESSMENT FOR HEARING AID: HCPCS | Performed by: AUDIOLOGIST

## 2023-05-23 PROCEDURE — 99207 PR NO CHARGE LOS: CPT | Performed by: AUDIOLOGIST

## 2023-05-23 NOTE — PROGRESS NOTES
AUDIOLOGY REPORT - Earmold Follow Up Right ear     SUBJECTIVE:  Charlotte Snow is a 83 year old year old female, seen today for follow up after fitting new /60 custom receivers and to at [facility]. Previous evaluation 1/24/2023 showed mild to severe sensorineural hearing loss bilaterally. The patient is currently using binaural Jennifer Halo i90 ERIC hearing aids. She reported she is hearing much better with the new custom encased receivers. Retention is good in the left and she is not getting feedback. She has not used the right since it was modified 5/11/2023. She notes that ear is not sore anymore.     OBJECTIVE:  Otoscopic examination showed no remaining irritation right, removed minimal wax with a lighted curette. Placed the previously modified right encased mold/. Needed to wiggle the ear and push at the vent area to get it passed her very tight first bend of her ear canal.     PLAN:   Recommended she use the hearing aid this afternoon, and let me know if there is any discomfort. If yes do not insert tomorrow, and follow up with me. If her ear feels okay, try to insert tomorrow. If she has trouble return for follow. As needed for programming and maintenance,.     Mynor Ordonez   no

## 2023-05-30 DIAGNOSIS — E11.42 TYPE 2 DIABETES MELLITUS WITH DIABETIC POLYNEUROPATHY, WITHOUT LONG-TERM CURRENT USE OF INSULIN (H): ICD-10-CM

## 2023-05-30 NOTE — TELEPHONE ENCOUNTER
Pending Prescriptions:                       Disp   Refills    Continuous Blood Gluc Sensor (FREESTYLE LI*       0        Sig: USE ONE EVERY 2 WEEKS (14 DAYS)      Routing refill request to provider for review/approval because:  Drug not on the [facility] refill protocol     Florence High RN on 5/30/2023 at 9:41 AM

## 2023-05-31 RX ORDER — BLOOD-GLUCOSE SENSOR
EACH MISCELLANEOUS
Qty: 2 EACH | Refills: 4 | Status: SHIPPED | OUTPATIENT
Start: 2023-05-31 | End: 2023-09-29

## 2023-07-18 ENCOUNTER — OFFICE VISIT (OUTPATIENT)
Dept: AUDIOLOGY | Facility: CLINIC | Age: 83
End: 2023-07-18
Payer: COMMERCIAL

## 2023-07-18 DIAGNOSIS — H90.3 SENSORINEURAL HEARING LOSS, BILATERAL: Primary | ICD-10-CM

## 2023-07-18 PROCEDURE — V5010 ASSESSMENT FOR HEARING AID: HCPCS | Performed by: AUDIOLOGIST

## 2023-07-18 PROCEDURE — 99207 PR NO CHARGE LOS: CPT | Performed by: AUDIOLOGIST

## 2023-07-18 NOTE — PROGRESS NOTES
AUDIOLOGY REPORT - Earmold Follow Up Right ear     SUBJECTIVE:  Charlotte Snow is a 83 year old year old female, seen today for follow up after fitting new /60 custom receivers and to at [facility]. Previous evaluation 1/24/2023 showed mild to severe sensorineural hearing loss bilaterally. The patient is currently using binaural Jennifer Halo i90 ERIC hearing aids. She reported retention is better. She would like to have the restaurant, and car programs added back. She also has questions about changes in hearing, initially she noted hearing better with the encased molds, but now is struggling with clarity. She had questions about cleaning and also needs bluetooth connection refreshed.     OBJECTIVE:  Otoscopic examination showed clear canals right and left. Buffed right encased  to smooth. Added Restaurant, and Car manual programs, also TV.    Discussed maintenance and reviewed cleaning.    Repaired hearing aids to her iPhone and [employer] lisbeth.     PLAN:   Recommended return as needed for programming and maintenance. If difficulty with clarity persists or worsens recommend audiology evaluation.     Cecilia Ordonez.

## 2023-07-24 ENCOUNTER — OFFICE VISIT (OUTPATIENT)
Dept: PODIATRY | Facility: CLINIC | Age: 83
End: 2023-07-24
Payer: COMMERCIAL

## 2023-07-24 VITALS
DIASTOLIC BLOOD PRESSURE: 56 MMHG | WEIGHT: 134 LBS | SYSTOLIC BLOOD PRESSURE: 122 MMHG | HEIGHT: 63 IN | BODY MASS INDEX: 23.74 KG/M2

## 2023-07-24 DIAGNOSIS — E11.42 DIABETIC POLYNEUROPATHY ASSOCIATED WITH TYPE 2 DIABETES MELLITUS (H): ICD-10-CM

## 2023-07-24 DIAGNOSIS — M19.071 OSTEOARTHRITIS OF RIGHT ANKLE AND FOOT: ICD-10-CM

## 2023-07-24 DIAGNOSIS — M76.821 POSTERIOR TIBIAL TENDINITIS OF RIGHT LOWER EXTREMITY: Primary | ICD-10-CM

## 2023-07-24 PROCEDURE — 99213 OFFICE O/P EST LOW 20 MIN: CPT | Performed by: PODIATRIST

## 2023-07-24 ASSESSMENT — PAIN SCALES - GENERAL: PAINLEVEL: NO PAIN (0)
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
## 2023-08-07 DIAGNOSIS — K21.9 GASTROESOPHAGEAL REFLUX DISEASE WITHOUT ESOPHAGITIS: ICD-10-CM

## 2023-08-07 DIAGNOSIS — G62.9 PERIPHERAL POLYNEUROPATHY: ICD-10-CM

## 2023-08-08 RX ORDER — PANTOPRAZOLE SODIUM 40 MG/1
TABLET, DELAYED RELEASE ORAL
Qty: 90 TABLET | Refills: 1 | Status: SHIPPED | OUTPATIENT
Start: 2023-08-08 | End: 2024-05-24

## 2023-08-08 RX ORDER — GABAPENTIN 300 MG/1
CAPSULE ORAL
Qty: 270 CAPSULE | Refills: 3 | Status: SHIPPED | OUTPATIENT
Start: 2023-08-08

## 2023-08-09 DIAGNOSIS — E11.9 TYPE 2 DIABETES MELLITUS WITHOUT COMPLICATION, WITHOUT LONG-TERM CURRENT USE OF INSULIN (H): ICD-10-CM

## 2023-08-09 RX ORDER — METFORMIN HCL 500 MG
500 TABLET, EXTENDED RELEASE 24 HR ORAL
Qty: 180 TABLET | Refills: 0 | Status: SHIPPED | OUTPATIENT
Start: 2023-08-09 | End: 2023-10-16

## 2023-09-29 DIAGNOSIS — E11.42 TYPE 2 DIABETES MELLITUS WITH DIABETIC POLYNEUROPATHY, WITHOUT LONG-TERM CURRENT USE OF INSULIN (H): ICD-10-CM

## 2023-09-29 DIAGNOSIS — G45.9 TIA (TRANSIENT ISCHEMIC ATTACK): ICD-10-CM

## 2023-09-29 RX ORDER — BLOOD-GLUCOSE SENSOR
EACH MISCELLANEOUS
Qty: 6 EACH | Refills: 0 | Status: SHIPPED | OUTPATIENT
Start: 2023-09-29 | End: 2023-12-27

## 2023-10-02 RX ORDER — CLOPIDOGREL BISULFATE 75 MG/1
TABLET ORAL
Qty: 90 TABLET | Refills: 3 | Status: SHIPPED | OUTPATIENT
Start: 2023-10-02

## 2023-10-03 ENCOUNTER — TELEPHONE (OUTPATIENT)
Dept: INTERNAL MEDICINE | Facility: CLINIC | Age: 83
End: 2023-10-03
Payer: COMMERCIAL

## 2023-10-03 NOTE — TELEPHONE ENCOUNTER
Prior Authorization Retail Medication Request    Medication/Dose: Continuous Blood Gluc Sensor (FREESTYLE JUN 3 SENSOR) [facility]  ICD code (if different than what is on RX):    Type 2 diabetes mellitus with diabetic polyneuropathy, without long-term current use of insulin (H) [E11.42]     Previously Tried and Failed:  na  Rationale:  na    Insurance Name:  MEDICARE   Insurance ID:  [id number]     [facility]  [id number]     Pharmacy Information (if different than what is on RX)  Name:  [facility]  Phone:  [phone]

## 2023-10-05 NOTE — TELEPHONE ENCOUNTER
PA Initiation    Medication: FREESTYLE JUN 3 SENSOR [facility]  Insurance Company: [employer] - Phone [phone] Fax [phone]  Pharmacy Filling the Rx: [facility] [address] [place] [address] [address]  Filling Pharmacy Phone: [phone]  Filling Pharmacy Fax:    Start Date: 10/5/2023  Central Prior Authorization Team   Phone: [phone]

## 2023-10-06 DIAGNOSIS — N95.2 ATROPHIC VAGINITIS: ICD-10-CM

## 2023-10-06 RX ORDER — ESTRADIOL 0.1 MG/G
1 CREAM VAGINAL
Qty: 42.5 G | Refills: 0 | Status: SHIPPED | OUTPATIENT
Start: 2023-10-09 | End: 2024-03-20

## 2023-10-06 NOTE — TELEPHONE ENCOUNTER
PRIOR AUTHORIZATION DENIED    Medication: FREESTYLE JUN 3 SENSOR [facility]  Insurance Company: [employer] - Phone [phone] Fax [phone]  Denial Date: 10/6/2023  Denial Rational:     Appeal Information:     Patient Notified: Pharmacy will notify patient.

## 2023-10-13 ENCOUNTER — TELEPHONE (OUTPATIENT)
Dept: INTERNAL MEDICINE | Facility: CLINIC | Age: 83
End: 2023-10-13
Payer: COMMERCIAL

## 2023-10-13 DIAGNOSIS — E11.9 TYPE 2 DIABETES MELLITUS WITHOUT COMPLICATION, WITHOUT LONG-TERM CURRENT USE OF INSULIN (H): ICD-10-CM

## 2023-10-13 DIAGNOSIS — N89.8 VAGINAL ITCHING: ICD-10-CM

## 2023-10-13 NOTE — TELEPHONE ENCOUNTER
Medication Question or Refill patient has been taking 1 pill in the am. And 1 in the pm. Would like it changed prescription changed to this.   metFORMIN (GLUCOPHAGE XR) 500 MG 24 hr tablet       What medication are you calling about (include dose and sig)?: metFORMIN (GLUCOPHAGE XR) 500 MG 24 hr tablet     Preferred Pharmacy:       [facility] - [place], [place] - [address]  [address] [address]  Phone: [phone] Fax: [phone]        Controlled Substance Agreement on file:   CSA -- Patient Level:    CSA: None found at the patient level.       Who prescribed the medication?: Dr. Pitt    Do you need a refill? Change to 2 tablets a day. 1 in the am and 1 in the pm.  Patient wanted you to know that she has been taking this amount for a long time. Also that her GMI IS 6.6    When did you use the medication last? today    Patient offered an appointment? No          Could we send this information to you in [facility] or would you prefer to receive a phone call?:   Patient would prefer a phone call   Okay to leave a detailed message?: Yes at Cell number on file:    Telephone Information:   Mobile [phone]

## 2023-10-13 NOTE — TELEPHONE ENCOUNTER
"Requested Prescriptions   Pending Prescriptions Disp Refills    triamcinolone (KENALOG) 0.1 % external ointment 30 g 0     Sig: Apply sparingly to affected area three times daily for 14 days.       Topical Steroids and Nonsteroidals Protocol Failed - 10/13/2023  4:07 PM        Failed - Recent (12 mo) or future (30 days) visit within the authorizing provider's specialty     Patient has had an office visit with the authorizing provider or a provider within the authorizing providers department within the previous 12 mos or has a future within next 30 days. See \"Patient Info\" tab in inbasket, or \"Choose Columns\" in Meds & Orders section of the refill encounter.              Failed - Medication is active on med list        Passed - Patient is age 6 or older        Passed - Authorizing prescriber's most recent note related to this medication read.     If refill request is for ophthalmic use, please forward request to provider for approval.          Passed - High potency steroid not ordered           Last seen: 7/14/2022  Last refilled: Not found  Unable to refill per protocol, routed to provider  Patient has appointment with provider 12/14/2023      "

## 2023-10-16 RX ORDER — TRIAMCINOLONE ACETONIDE 1 MG/G
OINTMENT TOPICAL
Qty: 30 G | Refills: 0 | Status: SHIPPED | OUTPATIENT
Start: 2023-10-16

## 2023-10-16 RX ORDER — METFORMIN HCL 500 MG
500 TABLET, EXTENDED RELEASE 24 HR ORAL 2 TIMES DAILY WITH MEALS
Qty: 180 TABLET | Refills: 3 | Status: SHIPPED | OUTPATIENT
Start: 2023-10-16

## 2023-10-16 NOTE — TELEPHONE ENCOUNTER
The patient is not on insulin and therefore may not meet criteria for continuous glucose monitoring per insurance company.  He has last hemoglobin A1c was 7.1, he would appear to be unnecessary as well.      Daisy

## 2023-10-30 ENCOUNTER — OFFICE VISIT (OUTPATIENT)
Dept: PHARMACY | Facility: CLINIC | Age: 83
End: 2023-10-30
Payer: COMMERCIAL

## 2023-10-30 VITALS — DIASTOLIC BLOOD PRESSURE: 66 MMHG | BODY MASS INDEX: 22.53 KG/M2 | SYSTOLIC BLOOD PRESSURE: 122 MMHG | WEIGHT: 127.2 LBS

## 2023-10-30 DIAGNOSIS — E78.5 HYPERLIPIDEMIA LDL GOAL <100: ICD-10-CM

## 2023-10-30 DIAGNOSIS — G45.9 TIA (TRANSIENT ISCHEMIC ATTACK): ICD-10-CM

## 2023-10-30 DIAGNOSIS — G62.9 PERIPHERAL POLYNEUROPATHY: ICD-10-CM

## 2023-10-30 DIAGNOSIS — Z78.9 TAKES DIETARY SUPPLEMENTS: ICD-10-CM

## 2023-10-30 DIAGNOSIS — E11.9 TYPE 2 DIABETES MELLITUS WITHOUT COMPLICATION, WITHOUT LONG-TERM CURRENT USE OF INSULIN (H): Primary | ICD-10-CM

## 2023-10-30 DIAGNOSIS — N39.0 RECURRENT UTI: ICD-10-CM

## 2023-10-30 DIAGNOSIS — K21.9 GASTROESOPHAGEAL REFLUX DISEASE, UNSPECIFIED WHETHER ESOPHAGITIS PRESENT: ICD-10-CM

## 2023-10-30 PROCEDURE — 99207 PR NO CHARGE LOS: CPT | Performed by: PHARMACIST

## 2023-10-30 NOTE — PROGRESS NOTES
Medication Therapy Management (MTM) Encounter    ASSESSMENT:                            Medication Adherence/Access: No issues identified    Type 2 Diabetes/Neuropathy:  A1c is close to goal of <7% and recent continuous glucose monitoring data suggests she is at goal.  Neuropathy is not an issue currently and may benefit from eliminating gabapentin to see if this is still necessary.     History of stroke/Hyperlipidemia: Stable.     GERD: Stable.     Recurrent UTI: Stable. Unclear if current supplement is better than new supplement.     Supplements: Not clear benefit for glucosamine/chondroitin. May benefit from trial off.       PLAN:                            Okay to consider stopping gabapentin now that you are only taking 300 mg ONCE daily to see if this is still necessary. Hold off on refilling.      Try to keep your Vitamin C intake below 500 mg per day if possible.     You are taking enough calcium I do not think you need to increase between your multivitamin, calcium supplement, and dietary intake. Could consider talking to Dr. Villa at a future appointment about getting a DEXA scan.  Your insurance likely would cover this since it's been 10 years.     Consider trying the new cranberry supplement - 1 capsule twice daily to see if it works as well as the AZO supplement    Consider just taking vitamin D3 25 mcg (1,000 units) once daily plus what you get in your multivitamin is likely enough.    Probiotic is likely fine to take - only 3 grams of sugar so not going to spike you much especially with the efforts you've made with your diet.     Glucosamine/chondroitin - could consider stopping to see if you're benefiting.      Follow-up: 6 months or sooner if needed    SUBJECTIVE/OBJECTIVE:                          Charlotte Snow is a 83 year old female coming in for a follow-up visit from 3/27/2023.       Reason for visit: Supplement questions.    Allergies/ADRs: Reviewed in chart  Past Medical History: Reviewed  in chart  Tobacco: She reports that she has never smoked. She has never used smokeless tobacco.  Alcohol: none    Medication Adherence/Access: no issues reported    Diabetes   Type 2 Diabetes/Neuropathy:  Currently taking metformin  mg twice daily. Does take gabapentin 300 mg every evening for neuropathy but she has reduced her dose without increase in her pain and wants to try off. Patient is not experiencing side effects.  Blood sugar monitoring: Continuous Glucose Monitor. Ranges (patient reported):     Symptoms of low blood sugar? none  Symptoms of high blood sugar? none  Diet/Exercise: has really focused on her diet and activity. Has lost a significant amount of weight (intentional).  Feels strong per patient.      Eye exam in the last 12 months? No    Foot exam is up to date  Urine Albumin:   Lab Results   Component Value Date    UMALCR 19.74 02/15/2022      Lab Results   Component Value Date    A1C 7.1 (H) 05/09/2023         History of stroke/Hyperlipidemia: Current therapy includes clopidogrel 75 mg daily and  atorvastatin 40 mg daily.  Patient reports no significant myalgias or other side effects.  The ASCVD Risk score (Audrey JOE, et al., 2019) failed to calculate for the following reasons:    The 2019 ASCVD risk score is only valid for ages 40 to 79    The patient has a prior MI or stroke diagnosis  Recent Labs   Lab Test 05/09/23  1108 02/15/22  1026 08/05/16  1403 08/19/15  0818   CHOL 162 130   < > 194   HDL 72 73   < > 77   LDL 77 36   < > 97   TRIG 63 104   < > 102   CHOLHDLRATIO  --   --   --  2.5    < > = values in this interval not displayed.     BP Readings from Last 3 Encounters:   10/30/23 122/66   07/24/23 122/56   05/09/23 128/66       GERD: Current medications include: Protonix (pantoprazole) 40 mg once daily. Patient reports no current symptoms.  Patient feels that current regimen is effective.    Recurrent UTI: Patient is using estradiol vaginal cream twice weekly and cranberry 500 mg  twice daily. Has not had issues with urinary tract infections until recently - had to take 2 courses of antibiotics in last 2 weeks. Also wondering about changing brands of cranberry. Denies any known side effects.    Supplements: Patient is taking vitamin B complex daily, calcium 600 mg daily, biotin 5000 mcg daily, vitamin D3 100 mcg daily, probiotic daily, a daily multivitamin, and glucosamine/chondroitin twice daily (she is not sure this is necessary). Stopped vitamin C due to Freestyle Justen interaction. Denies any issues.    Today's Vitals: /66   Wt 127 lb 3.2 oz (57.7 kg)   BMI 22.53 kg/m      Wt Readings from Last 5 Encounters:   10/30/23 127 lb 3.2 oz (57.7 kg)   07/24/23 134 lb (60.8 kg)   05/09/23 134 lb (60.8 kg)   03/27/23 144 lb (65.3 kg)   12/14/22 155 lb (70.3 kg)     ----------------      I spent 35 minutes with this patient today. All changes were made via collaborative practice agreement with Aron Villa DO. A copy of the visit note was provided to the patient's provider(s).    A summary of these recommendations was given to the patient.    Virgil Morgan, PharmD, [facility]  Medication Therapy Management Pharmacist  Pager: [phone]     Medication Therapy Recommendations  Peripheral neuropathy    Current Medication: gabapentin (NEURONTIN) 300 MG capsule   Rationale: No medical indication at this time - Unnecessary medication therapy - Indication   Recommendation: Discontinue Medication - Stop gabapentin   Status: Accepted per CPA         Takes dietary supplements    Current Medication: glucosamine-chondroitin 500-400 MG CAPS per capsule   Rationale: No medical indication at this time - Unnecessary medication therapy - Indication   Recommendation: Discontinue Medication - Stop glucosamine/chondroitin   Status: Accepted - no CPA Needed

## 2023-10-30 NOTE — PATIENT INSTRUCTIONS
"Recommendations from today's MTM visit:                                                    MTM (medication therapy management) is a service provided by a clinical pharmacist designed to help you get the most of out of your medicines.      Okay to consider stopping gabapentin now that you are only taking 300 mg ONCE daily to see if this is still necessary. Hold off on refilling.      Try to keep your Vitamin C intake below 500 mg per day if possible.     You are taking enough calcium I do not think you need to increase between your multivitamin, calcium supplement, and dietary intake. Could consider talking to Dr. Villa at a future appointment about getting a DEXA scan.  Your insurance likely would cover this since it's been 10 years.     Consider trying the new cranberry supplement - 1 capsule twice daily to see if it works as well as the AZO supplement    Consider just taking vitamin D3 25 mcg (1,000 units) once daily plus what you get in your multivitamin is likely enough.    Probiotic is likely fine to take - only 3 grams of sugar so not going to spike you much especially with the efforts you've made with your diet.     Glucosamine/chondroitin - could consider stopping to see if you're benefiting.      Follow-up: 6 months or sooner if needed    It was great speaking with you today.  I value your experience and would be very thankful for your time in providing feedback in our clinic survey. In the next few days, you may receive an email or text message from [employer] with a link to a survey related to your  clinical pharmacist.\"     To schedule another MTM appointment, please call the clinic directly or you may call the MTM scheduling line at [phone] or toll-free at [phone].     My Clinical Pharmacist's contact information:                                                      Please feel free to contact me with any questions or concerns you have.      Virgil Morgan, PharmD, BCACP  Medication Therapy " Management Pharmacist  Pager: [phone]

## 2023-11-13 ENCOUNTER — HOSPITAL ENCOUNTER (OUTPATIENT)
Dept: MAMMOGRAPHY | Facility: CLINIC | Age: 83
Discharge: HOME | End: 2023-11-13
Attending: INTERNAL MEDICINE | Admitting: INTERNAL MEDICINE
Payer: MEDICARE

## 2023-11-13 DIAGNOSIS — Z12.31 ENCOUNTER FOR SCREENING MAMMOGRAM FOR BREAST CANCER: ICD-10-CM

## 2023-11-13 PROCEDURE — 77067 SCR MAMMO BI INCL CAD: CPT

## 2023-11-14 ENCOUNTER — TELEPHONE (OUTPATIENT)
Dept: INTERNAL MEDICINE | Facility: CLINIC | Age: 83
End: 2023-11-14
Payer: COMMERCIAL

## 2023-11-14 DIAGNOSIS — R92.8 ABNORMAL MAMMOGRAM: Primary | ICD-10-CM

## 2023-11-14 NOTE — TELEPHONE ENCOUNTER
Orders for an ultrasound of breast have been placed.  Please help the patient to set this up.    Thank you for this.    Daisy

## 2023-11-14 NOTE — TELEPHONE ENCOUNTER
Patient calling stating her mammogram results stated she would need a repeat mammogram. If this is the case, please place repeat order and have staff help patient make another appointment.

## 2023-11-26 ENCOUNTER — TELEPHONE (OUTPATIENT)
Dept: INTERNAL MEDICINE | Facility: CLINIC | Age: 83
End: 2023-11-26
Payer: COMMERCIAL

## 2023-11-26 ENCOUNTER — NURSE TRIAGE (OUTPATIENT)
Dept: NURSING | Facility: CLINIC | Age: 83
End: 2023-11-26
Payer: COMMERCIAL

## 2023-11-26 NOTE — TELEPHONE ENCOUNTER
Reason for Call:  Appointment Request    Patient requesting this type of appt:  Spot on finger and 5 spots on leg - spots on leg do not have a ring.    Requested provider: Aron Villa    Reason patient unable to be scheduled: Not within requested timeframe    When does patient want to be seen/preferred time:  Before 12/04 - pt is flying out of town    Comments:   Pt is open to seeing any provider available if PCP is unavailable.      Could we send this information to you in [employer] [facility] [employer] or would you prefer to receive a phone call?:   Patient would prefer a phone call   Okay to leave a detailed message?: Yes at Cell number on file:    Telephone Information:   Mobile [phone]       Call taken on 11/26/2023 at 4:00 PM by KHADRA LAM

## 2023-11-26 NOTE — TELEPHONE ENCOUNTER
"Nurse Triage SBAR    Situation: Sore - Rash     Background: Patient calling. On Friday on her finger she noted a \"big round sore\" and put neosporin on it. She is going out of town on Dec 6th.     Assessment: Today there is a [place] around the area. She was itching her knee and there are spots on her knee - about 5 spots. Sone on her finger is about 1/4th inch. No drainage. Denied fever.     Protocol Recommended Disposition: See Physician within 4 hours    Recommendation: According to the protocol, Patient should be seen within 4 hours. Advised Patient that the patient needs to be seen within 4 hours. Care advice given. Patient verbalizes understanding and agrees with plan of care. Reviewed concerning symptoms and when to call back.     Victoria Bernardo RN Nursing Advisor 11/26/2023 4:13 PM     Reason for Disposition   [1] Looks infected (spreading redness, pus) AND [2] diabetes mellitus or weak immune system (e.g., HIV positive, cancer chemo, splenectomy, organ transplant, chronic steroids)    Additional Information   Negative: Sounds like a life-threatening emergency to the triager   Negative: Followed a burn   Negative: Chickenpox suspected (e.g., fever, widespread rash after known chickenpox exposure)   Negative: Followed an injury (i.e., from an abrasion or scratch)   Negative: Boil (abscess) suspected (painful red lump)   Negative: Cold sore suspected (i.e., fever blister sore on the outer lip)   Negative: Impetigo suspected (e.g., infected sore; soft yellow crusts or scabs)   Negative: Insect bite(s) suspected   Negative: Poison ivy, oak, or sumac rash suspected (e.g., itchy rash after contact with poison ivy)   Negative: Ringworm suspected (i.e., round pink patch, sometimes looks like ring, usually 1/2 to 1 inch [12-25 mm],  in size, slowly increasing in size)   Negative: Sore(s) on female genital area  (e.g., labia, vagina, vulva)   Negative: Sore(s) on male genital area (e.g., penis, scrotum)   Negative: Wound " infection suspected (in traumatic or surgical wound)   Negative: Patient sounds very sick or weak to the triager   Negative: [1] Red area or streak AND [2] fever   Negative: [1] Looks infected (spreading redness, pus) AND [2] large red area (> 2 in. or 5 cm)    Protocols used: Sores-A-CHAYITO

## 2023-11-27 ENCOUNTER — OFFICE VISIT (OUTPATIENT)
Dept: INTERNAL MEDICINE | Facility: CLINIC | Age: 83
End: 2023-11-27
Payer: COMMERCIAL

## 2023-11-27 VITALS
OXYGEN SATURATION: 99 % | WEIGHT: 126 LBS | RESPIRATION RATE: 16 BRPM | DIASTOLIC BLOOD PRESSURE: 78 MMHG | HEIGHT: 63 IN | HEART RATE: 72 BPM | TEMPERATURE: 97.5 F | BODY MASS INDEX: 22.32 KG/M2 | SYSTOLIC BLOOD PRESSURE: 126 MMHG

## 2023-11-27 DIAGNOSIS — B09 VIRAL EXANTHEM: ICD-10-CM

## 2023-11-27 DIAGNOSIS — E11.42 DIABETIC POLYNEUROPATHY ASSOCIATED WITH TYPE 2 DIABETES MELLITUS (H): ICD-10-CM

## 2023-11-27 DIAGNOSIS — E11.65 TYPE 2 DIABETES MELLITUS WITH HYPERGLYCEMIA, WITHOUT LONG-TERM CURRENT USE OF INSULIN (H): ICD-10-CM

## 2023-11-27 DIAGNOSIS — S60.00XA TRAUMATIC ECCHYMOSIS OF FINGER, INITIAL ENCOUNTER: Primary | ICD-10-CM

## 2023-11-27 PROCEDURE — G0008 ADMIN INFLUENZA VIRUS VAC: HCPCS | Performed by: INTERNAL MEDICINE

## 2023-11-27 PROCEDURE — 90662 IIV NO PRSV INCREASED AG IM: CPT | Performed by: INTERNAL MEDICINE

## 2023-11-27 PROCEDURE — 99214 OFFICE O/P EST MOD 30 MIN: CPT | Mod: 25 | Performed by: INTERNAL MEDICINE

## 2023-11-27 RX ORDER — RESPIRATORY SYNCYTIAL VIRUS VACCINE 120MCG/0.5
0.5 KIT INTRAMUSCULAR ONCE
Qty: 1 EACH | Refills: 0 | Status: CANCELLED | OUTPATIENT
Start: 2023-11-27 | End: 2023-11-27

## 2023-11-27 NOTE — PROGRESS NOTES
"      Charleen Porter is a 83 year old, presenting for the following health issues:  Derm Problem      11/27/2023     8:52 AM   Additional Questions   Roomed by Lakisha Gillette       History of Present Illness       Reason for visit:  Sores on finger and on leg    She eats 2-3 servings of fruits and vegetables daily.She consumes 0 sweetened beverage(s) daily.She exercises with enough effort to increase her heart rate 30 to 60 minutes per day.  She exercises with enough effort to increase her heart rate 6 days per week.   She is taking medications regularly.            EMR reviewed including:             Complaint, History of Chief Complaint, Corresponding Review of Systems, and Complaint Specific Physical Examination.    #1   Discoloration of the radial aspect of the distal interphalangeal joint on the ring finger of the right hand.  Present for couple days.  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  Has a small area of ecchymosis described as noted.  No evidence of hematoma.  No evidence of trauma.  Range of motion of the interphalangeal joint somewhat limited due to arthritis.  Distal neurovascular intact.    #2   Rash on inner aspect of right leg at the knee  5 small subcentimeter macular areas of erythema.  Minimally raised.  Mildly discolored.  Patient notes he were initially pruritic but are now getting better and \"going away\".  Used some \"itch cream\" with good results.  No other skin lesions noted.  No vesicles, no evidence of shingles etc.    #3   Follow-up on type 2 diabetes continues gabapentin for neuropathy.  Taking only 300 mg 3 times daily with good control.  Denies side effects from medication.  On metformin 500 mg twice daily, taking statin, Plavix.  Currently not on ACEI or ARB.  Did not tolerate tension.  Home glucose monitor suggests A1c under 7.  Last laboratory A1c was 7.1 in May of this year.        Exam:   LUNGS: clear bilaterally, airflow is brisk, no intercostal retraction or " "stridor is noted. No coughing is noted during visit.   HEART:  regular without rubs, clicks, gallops, or murmurs. PMI is nondisplaced. Upstrokes are brisk. S1,S2 are heard.   GI: Abdomen is soft, without rebound, guarding or tenderness. Bowel sounds are appropriate. No renal bruits are heard.   NEURO: Pt is alert and appropriate. No neurologic lateralization is noted. Cranial nerves 2-12 are intact. Peripheral sensory and motor function are grossly normal.         Patient has been interviewed, applicable history and applied review of systems have been performed.    Vital Signs:   /78   Pulse 72   Temp 97.5  F (36.4  C) (Temporal)   Resp 16   Ht 1.6 m (5' 3\")   Wt 57.2 kg (126 lb)   SpO2 99%   BMI 22.32 kg/m        Decision Making    Problem and Complexity     1. Traumatic ecchymosis of finger, initial encounter  Recommend observation    2. Viral exanthem  Recommend observation    3. Type 2 diabetes mellitus with hyperglycemia, without long-term current use of insulin (H)  Continue current medication.  Check A1c and renal function    4. Diabetic polyneuropathy associated with type 2 diabetes mellitus (H)  As above    - HEMOGLOBIN A1C; Future  - Basic metabolic panel  (Ca, Cl, CO2, Creat, Gluc, K, Na, BUN); Future                                FOLLOW UP   I have asked the patient to make an appointment for followup with me in 4 months    Regarding routine vaccinations:  I have reviewed the patient's vaccination schedule and discussed the benefits of prophylactic vaccination in detail.  I recommend the patient contact their pharmacist for vaccinations.  Discussed that most insurance companies now favor reimbursement to the pharmacies and it will financially behoove the patient to have vaccinations performed at their pharmacy.        I have carefully explained the diagnosis and treatment options to the patient.  The patient has displayed an understanding of the above, and all subsequent questions were " answered.      DO JT Thompson    Portions of this note were produced using [employer]  Although every attempt at real-time proof reading has been made, occasional grammar/syntax errors may have been missed.

## 2023-11-28 ENCOUNTER — HOSPITAL ENCOUNTER (OUTPATIENT)
Dept: ULTRASOUND IMAGING | Facility: CLINIC | Age: 83
Discharge: HOME | End: 2023-11-28
Attending: INTERNAL MEDICINE
Payer: MEDICARE

## 2023-11-28 ENCOUNTER — HOSPITAL ENCOUNTER (OUTPATIENT)
Dept: MAMMOGRAPHY | Facility: CLINIC | Age: 83
Discharge: HOME | End: 2023-11-28
Attending: INTERNAL MEDICINE
Payer: MEDICARE

## 2023-11-28 DIAGNOSIS — R92.8 ABNORMAL MAMMOGRAM: ICD-10-CM

## 2023-11-28 PROCEDURE — 77062 BREAST TOMOSYNTHESIS BI: CPT

## 2023-11-28 PROCEDURE — 272N000431 US BREAST BIOPSY CORE NEEDLE RIGHT

## 2023-11-28 PROCEDURE — 88305 TISSUE EXAM BY PATHOLOGIST: CPT | Mod: TC | Performed by: INTERNAL MEDICINE

## 2023-11-28 PROCEDURE — 19083 BX BREAST 1ST LESION US IMAG: CPT | Mod: RT

## 2023-11-28 PROCEDURE — 76642 ULTRASOUND BREAST LIMITED: CPT | Mod: 50

## 2023-11-28 PROCEDURE — 999N000065 MA POST PROCEDURE RIGHT

## 2023-11-28 PROCEDURE — 250N000009 HC RX 250: Performed by: INTERNAL MEDICINE

## 2023-11-28 RX ORDER — LIDOCAINE HYDROCHLORIDE 10 MG/ML
10 INJECTION, SOLUTION EPIDURAL; INFILTRATION; INTRACAUDAL; PERINEURAL ONCE
Status: COMPLETED | OUTPATIENT
Start: 2023-11-28 | End: 2023-11-28

## 2023-11-28 RX ADMIN — LIDOCAINE HYDROCHLORIDE 7 ML: 10 INJECTION, SOLUTION EPIDURAL; INFILTRATION; INTRACAUDAL; PERINEURAL at 14:54

## 2023-12-01 ENCOUNTER — TELEPHONE (OUTPATIENT)
Dept: MAMMOGRAPHY | Facility: CLINIC | Age: 83
End: 2023-12-01
Payer: COMMERCIAL

## 2023-12-01 LAB
PATH REPORT.COMMENTS IMP SPEC: NORMAL
PATH REPORT.FINAL DX SPEC: NORMAL
PATH REPORT.GROSS SPEC: NORMAL
PATH REPORT.MICROSCOPIC SPEC OTHER STN: NORMAL
PATH REPORT.RELEVANT HX SPEC: NORMAL
PHOTO IMAGE: NORMAL

## 2023-12-01 PROCEDURE — 88305 TISSUE EXAM BY PATHOLOGIST: CPT | Mod: 26 | Performed by: PATHOLOGY

## 2023-12-01 NOTE — TELEPHONE ENCOUNTER
After review by Breast Center Radiologist, Dr. Bita Roth, Charlotte was called,  verified, and given her 2023 RIGHT Breast Biopsy results (Benign) and recommended Follow up (6 month follow up right breast ultrasound).   Patient denies any concerns with the biopsy site. Ordering provider was informed of the results and follow up plan.  I encouraged her to contact her doctor with any further breast concerns.  Patient verbalized understanding and agrees with the plan of care.        Nerissa Chavez RN BSN  Procedure Nurse  [facility]  [phone]    ------------------------------------------------------------------    [facility]  Charlotte Snow [id number]  F, 1940  Surgical Pathology Report (Final result) [id number]  Authorizing Provider: Aron Villa DO Ordering Provider: Aron Villa DO  Ordering Location: [facility]  Imaging  Collected: 2023 02:45 PM  Pathologist: Erin Garcia MD Received: 2023 02:59 PM  .  Specimens  A Breast, Right, 2:00 8 CM FN  .  .  Final Diagnosis  A. Breast, right, 2:00, 8 cm from nipple, ultrasound-guided needle core biopsy:  -Benign breast tissue and blood.  -See comment.  Electronically signed by Erin Garcia MD on 2023 at 12:46 PM

## 2023-12-14 ENCOUNTER — OFFICE VISIT (OUTPATIENT)
Dept: OBGYN | Facility: OTHER | Age: 83
End: 2023-12-14
Payer: COMMERCIAL

## 2023-12-14 VITALS — BODY MASS INDEX: 22.32 KG/M2 | DIASTOLIC BLOOD PRESSURE: 79 MMHG | SYSTOLIC BLOOD PRESSURE: 152 MMHG | WEIGHT: 126 LBS

## 2023-12-14 DIAGNOSIS — N81.6 RECTOCELE: ICD-10-CM

## 2023-12-14 DIAGNOSIS — N95.2 ATROPHIC VAGINITIS: Primary | ICD-10-CM

## 2023-12-14 PROCEDURE — 99213 OFFICE O/P EST LOW 20 MIN: CPT | Performed by: OBSTETRICS & GYNECOLOGY
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
## 2023-12-14 NOTE — PATIENT INSTRUCTIONS
If you have labs or imaging done, the results will automatically release in [facility] without an interpretation.  Your health care professional will review those results and send an interpretation with recommendations as soon as possible, but this may be 1-3 business days.    If you have any questions regarding your visit, please contact your care team.     [facility] [facility]: [phone]  [facility] CLINIC HOURS TELEPHONE NUMBER       MD Denia Thomas - Certified Medical Assistant     Homa- LEAD RN  Kayleigh-GARY Grimes-GARY Powell-  Sunni-     Monday- [place]  8:00 a.m - 5:00 p.m    Tuesday- Surgery        Thursday- [place]  8:00 a.m - 5:00 p.m.    Friday- [place]  7:30 a.m - 4:00 p.m. [facility]  [address]  [place], [place] [address] [phone] Fax  [phone] Phone  Imaging Scheduling [phone]    [facility]  [address] [place] [address] [address].  [place], [place] [address] [phone]    [facility]  [address], [place] [id number] [phone] Phone  [phone] Fax  Imaging Scheduling [phone]     Urgent Care locations:  [facility] Monday-Friday  10 am - 8 pm  Saturday and Sunday   9 am - 5 pm  Monday-Friday   10 am - 8 pm  Saturday and Sunday   9 am - 5 pm   [phone]     **Surgeries** Our Surgery Schedulers will contact you to schedule. If you do not receive a call within 3 business days, please call [phone].    If you need a medication refill, please contact your pharmacy. Please allow 3 business days for your refill to be completed.    As always, thank you for trusting us with your healthcare needs!

## 2023-12-21 ENCOUNTER — OFFICE VISIT (OUTPATIENT)
Dept: AUDIOLOGY | Facility: CLINIC | Age: 83
End: 2023-12-21
Payer: COMMERCIAL

## 2023-12-21 DIAGNOSIS — H90.3 SENSORINEURAL HEARING LOSS, BILATERAL: Primary | ICD-10-CM

## 2023-12-21 PROCEDURE — V5010 ASSESSMENT FOR HEARING AID: HCPCS | Performed by: AUDIOLOGIST

## 2023-12-21 NOTE — PROGRESS NOTES
AUDIOLOGY REPORT - Hearing Aid Check     SUBJECTIVE:  Charlotte Snow is a 83 year old year old female, seen today for hearing aid maintenance at [facility]. Previous evaluation 1/24/2023 showed mild to severe sensorineural hearing loss bilaterally. The patient is currently using binaural Jennifer Halo i90 ERIC hearing aids with encased /60 custom receivers. She reported retention is better.     She reports struggling to hear in noisy situations, especially if someone has an accent, hearing on the phone especially when someone has an accent, also struggling with rapid speech. In small groups and one-to-one she does pretty well.     OBJECTIVE:  Otoscopic examination showed some wax bilaterally, which was removed without incident using an lighted curette. Hearing aid maintenance was completed to vacuum fan ports, replaced occluded left wax filter. Biologic check of the right and left devices after maintenance revealed appropriate function.     Discussed maintenance and reviewed cleaning.     Verified hearing aids paired to her iPhone and [employer] lisbeth.    Discussed realistic expectations with changes in hearing and word understanding and processing of sound.     PLAN:   Recommended return for audiology evaluation to monitor word understanding, consider trial with a different set of hearing aids. Provided communication strategies handout, and discussed using aural rehab/listening practice apps. She should be receiving an invitation to [employer] a program used here at [facility].     Cecilia Ordonez.

## 2023-12-27 DIAGNOSIS — E11.42 TYPE 2 DIABETES MELLITUS WITH DIABETIC POLYNEUROPATHY, WITHOUT LONG-TERM CURRENT USE OF INSULIN (H): ICD-10-CM

## 2023-12-27 RX ORDER — BLOOD-GLUCOSE SENSOR
EACH MISCELLANEOUS
Qty: 6 EACH | Refills: 3 | Status: SHIPPED | OUTPATIENT
Start: 2023-12-27

## 2024-01-06 ENCOUNTER — HEALTH MAINTENANCE LETTER (OUTPATIENT)
Age: 84
End: 2024-01-06

## 2024-01-11 DIAGNOSIS — G45.9 TIA (TRANSIENT ISCHEMIC ATTACK): ICD-10-CM

## 2024-01-11 RX ORDER — ATORVASTATIN CALCIUM 40 MG/1
TABLET, FILM COATED ORAL
Qty: 90 TABLET | Refills: 0 | Status: SHIPPED | OUTPATIENT
Start: 2024-01-11 | End: 2024-04-01

## 2024-02-19 ENCOUNTER — TRANSFERRED RECORDS (OUTPATIENT)
Dept: HEALTH INFORMATION MANAGEMENT | Facility: CLINIC | Age: 84
End: 2024-02-19
Payer: COMMERCIAL

## 2024-02-19 LAB — RETINOPATHY: NEGATIVE

## 2024-02-22 NOTE — TELEPHONE ENCOUNTER
Patient is at the clinic and had went to lab. She is wanting an order to have her urine checked. Patient is waiting hopefully so she does not have to come back. Please advise as soon as possible. Suma Cantu LPN      Reason for call:   [x] Refill   [] Prior Auth  [] Other:     Office:   [] PCP/Provider -   [x] Specialty/Provider - Ortho     Medication: Oxycodone 5 mg, take 1 tablet by mouth 2 times a day as needed       Pharmacy: [facility]     Does the patient have enough for 3 days?   [x] Yes   [] No - Send as HP to POD

## 2024-03-20 DIAGNOSIS — N95.2 ATROPHIC VAGINITIS: ICD-10-CM

## 2024-03-20 RX ORDER — ESTRADIOL 0.1 MG/G
CREAM VAGINAL
Qty: 43 G | Refills: 0 | Status: SHIPPED | OUTPATIENT
Start: 2024-03-20 | End: 2024-08-23

## 2024-03-30 DIAGNOSIS — G45.9 TIA (TRANSIENT ISCHEMIC ATTACK): ICD-10-CM

## 2024-04-01 RX ORDER — ATORVASTATIN CALCIUM 40 MG/1
TABLET, FILM COATED ORAL
Qty: 90 TABLET | Refills: 0 | Status: SHIPPED | OUTPATIENT
Start: 2024-04-01 | End: 2024-07-08

## 2024-04-02 ENCOUNTER — OFFICE VISIT (OUTPATIENT)
Dept: AUDIOLOGY | Facility: CLINIC | Age: 84
End: 2024-04-02
Payer: COMMERCIAL

## 2024-04-02 DIAGNOSIS — H90.3 SENSORINEURAL HEARING LOSS, BILATERAL: Primary | ICD-10-CM

## 2024-04-02 PROCEDURE — 92567 TYMPANOMETRY: CPT | Performed by: AUDIOLOGIST

## 2024-04-02 PROCEDURE — 92557 COMPREHENSIVE HEARING TEST: CPT | Performed by: AUDIOLOGIST

## 2024-04-02 NOTE — PROGRESS NOTES
AUDIOLOGY REPORT    BACKGROUND:  Self-referred for concern regarding decreased hearing feels like conversation is not clear, even with consistent hearing aid use. She denied tinnitus, dizziness, and vertigo. She denied any falls in the last year. Passed safety screening. Previous evaluation 1/24/2023 showed mild sloping to severe sensorineural hearing loss bilaterally. 88% and 76% right and left word recognition in quiet.    RESULTS:  Otoscopy showed clear canals with visible landmarks bilaterally, after moderate non-occluding wax was removed without incident using a lighted curette and micro forceps right ear. Tympanograms showed normal mobility and pressure right ear and left ear. Could not maintain seal to test reflexes. Pure tones showed mild to moderate to severe sensorineural hearing loss right ear and moderate sloping to severe sensorineural hearing loss left ear. Stable pure tones. Good SRT / PTA agreement. Fair word understanding in quiet 66% right, 64%  left, and 90% binaural, 50-word list word recognition decreased. Programmed loaner Signia Pure Charge & Go 3 IX demo devices for her to try.    RECOMMENDATIONS:  Return in 1-2 years for audiology evaluation, sooner if there are concerns with changes in hearing. Return in 2-3 weeks for follow up on hearing aid trial. Continue use of F[employer] lisbeth, for listening practice.    Cecilia Ordonez.  [place] Licensed Audiologist #[id number]

## 2024-04-16 ENCOUNTER — OFFICE VISIT (OUTPATIENT)
Dept: AUDIOLOGY | Facility: CLINIC | Age: 84
End: 2024-04-16
Payer: COMMERCIAL

## 2024-04-16 DIAGNOSIS — H90.3 SENSORINEURAL HEARING LOSS, BILATERAL: Primary | ICD-10-CM

## 2024-04-16 PROCEDURE — V5010 ASSESSMENT FOR HEARING AID: HCPCS | Performed by: AUDIOLOGIST

## 2024-04-16 NOTE — PROGRESS NOTES
AUDIOLOGY REPORT     BACKGROUND:  Charlotte was seen after trial with new hearing aid technology, Signia Pure Charge & Go 3 IX. 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 Fair word understanding in quiet 66% right, 64% left, and 90% binaural, 50-word list.    She reported the non-custom sleeves are comfortable, but don't stay in and she is getting feedback. She also noted she is not hearing significantly better with the hearing aids which is discouraging.     RESULTS:  Programmed loaner Signia Pure Charge & Go 7 IX demo devices for her to try. Changed from 3 to 7 to see if she notices a difference. Paired to her phone and streamed a call.     RECOMMENDATIONS:  Return in 2-3 weeks for follow up on hearing aid trial. Continue use of [employer] lisbeth, for listening practice.     Mynor Ordonez Licensed Audiologist #[id number]

## 2024-04-26 ENCOUNTER — OFFICE VISIT (OUTPATIENT)
Dept: AUDIOLOGY | Facility: CLINIC | Age: 84
End: 2024-04-26
Payer: COMMERCIAL

## 2024-04-26 DIAGNOSIS — H90.3 SENSORINEURAL HEARING LOSS, BILATERAL: Primary | ICD-10-CM

## 2024-04-26 PROCEDURE — V5010 ASSESSMENT FOR HEARING AID: HCPCS | Performed by: AUDIOLOGIST

## 2024-04-26 NOTE — PROGRESS NOTES
AUDIOLOGY REPORT     BACKGROUND:  Charlotte was seen after trial with new hearing aid technology, Signia Pure Charge & Go 7 IX. 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 Fair word understanding in quiet 66% right, 64% left, and 90% binaural, 50-word list.     She reported she needed to ask people to repeat less, and the TV volume was lower. She has reservations about the difference in cost between 3 IX and 7 IX.     RESULTS:  Programmed loaner Signia Pure Charge & Go 3 IX demo devices for her to try. Discussed keeping track of how she is hearing in situations where she is struggling and wants to hear better. Then we can try the 7 IX again and have a better comparison.     RECOMMENDATIONS:  Return in 2-3 weeks for follow up on hearing aid trial. Continue use of [employer] lisbeth, for listening practice.     Mynor Ordonez Licensed Audiologist #[id number]

## 2024-05-24 DIAGNOSIS — K21.9 GASTROESOPHAGEAL REFLUX DISEASE WITHOUT ESOPHAGITIS: ICD-10-CM

## 2024-05-24 RX ORDER — PANTOPRAZOLE SODIUM 40 MG/1
TABLET, DELAYED RELEASE ORAL
Qty: 90 TABLET | Refills: 2 | Status: SHIPPED | OUTPATIENT
Start: 2024-05-24

## 2024-05-29 ENCOUNTER — TELEPHONE (OUTPATIENT)
Dept: AUDIOLOGY | Facility: CLINIC | Age: 84
End: 2024-05-29

## 2024-05-29 ENCOUNTER — OFFICE VISIT (OUTPATIENT)
Dept: INTERNAL MEDICINE | Facility: CLINIC | Age: 84
End: 2024-05-29
Payer: COMMERCIAL

## 2024-05-29 VITALS
BODY MASS INDEX: 22.88 KG/M2 | TEMPERATURE: 97.8 F | DIASTOLIC BLOOD PRESSURE: 80 MMHG | HEART RATE: 72 BPM | OXYGEN SATURATION: 98 % | WEIGHT: 129.1 LBS | HEIGHT: 63 IN | SYSTOLIC BLOOD PRESSURE: 138 MMHG | RESPIRATION RATE: 18 BRPM

## 2024-05-29 DIAGNOSIS — E11.42 TYPE 2 DIABETES MELLITUS WITH DIABETIC POLYNEUROPATHY, WITHOUT LONG-TERM CURRENT USE OF INSULIN (H): ICD-10-CM

## 2024-05-29 DIAGNOSIS — N18.2 CKD (CHRONIC KIDNEY DISEASE) STAGE 2, GFR 60-89 ML/MIN: Primary | ICD-10-CM

## 2024-05-29 DIAGNOSIS — R04.0 EPISTAXIS: ICD-10-CM

## 2024-05-29 DIAGNOSIS — E78.5 HYPERLIPIDEMIA LDL GOAL <100: ICD-10-CM

## 2024-05-29 DIAGNOSIS — H90.6 MIXED CONDUCTIVE AND SENSORINEURAL HEARING LOSS OF BOTH EARS: ICD-10-CM

## 2024-05-29 DIAGNOSIS — N30.00 ACUTE CYSTITIS WITHOUT HEMATURIA: ICD-10-CM

## 2024-05-29 DIAGNOSIS — E11.42 DIABETIC POLYNEUROPATHY ASSOCIATED WITH TYPE 2 DIABETES MELLITUS (H): ICD-10-CM

## 2024-05-29 LAB
ALBUMIN UR-MCNC: NEGATIVE MG/DL
ANION GAP SERPL CALCULATED.3IONS-SCNC: 10 MMOL/L (ref 7–15)
APPEARANCE UR: CLEAR
BACTERIA #/AREA URNS HPF: ABNORMAL /HPF
BILIRUB UR QL STRIP: NEGATIVE
BUN SERPL-MCNC: 27.3 MG/DL (ref 8–23)
CALCIUM SERPL-MCNC: 10 MG/DL (ref 8.8–10.2)
CAOX CRY #/AREA URNS HPF: ABNORMAL /HPF
CHLORIDE SERPL-SCNC: 97 MMOL/L (ref 98–107)
CHOLEST SERPL-MCNC: 147 MG/DL
COLOR UR AUTO: YELLOW
CREAT SERPL-MCNC: 0.79 MG/DL (ref 0.51–0.95)
CREAT UR-MCNC: 90.5 MG/DL
DEPRECATED HCO3 PLAS-SCNC: 28 MMOL/L (ref 22–29)
EGFRCR SERPLBLD CKD-EPI 2021: 74 ML/MIN/1.73M2
FASTING STATUS PATIENT QL REPORTED: NO
FASTING STATUS PATIENT QL REPORTED: NO
GLUCOSE SERPL-MCNC: 122 MG/DL (ref 70–99)
GLUCOSE UR STRIP-MCNC: NEGATIVE MG/DL
HBA1C MFR BLD: 7.3 %
HDLC SERPL-MCNC: 76 MG/DL
HGB UR QL STRIP: NEGATIVE
KETONES UR STRIP-MCNC: NEGATIVE MG/DL
LDLC SERPL CALC-MCNC: 57 MG/DL
LEUKOCYTE ESTERASE UR QL STRIP: ABNORMAL
MICROALBUMIN UR-MCNC: 12.3 MG/L
MICROALBUMIN/CREAT UR: 13.59 MG/G CR (ref 0–25)
MUCOUS THREADS #/AREA URNS LPF: PRESENT /LPF
NITRATE UR QL: NEGATIVE
NONHDLC SERPL-MCNC: 71 MG/DL
PH UR STRIP: 5 [PH] (ref 5–7)
POTASSIUM SERPL-SCNC: 4.5 MMOL/L (ref 3.4–5.3)
RBC URINE: 0 /HPF
SODIUM SERPL-SCNC: 135 MMOL/L (ref 135–145)
SP GR UR STRIP: 1.02 (ref 1–1.03)
SQUAMOUS EPITHELIAL: 6 /HPF
TRIGL SERPL-MCNC: 71 MG/DL
UROBILINOGEN UR STRIP-MCNC: NORMAL MG/DL
WBC URINE: 24 /HPF

## 2024-05-29 PROCEDURE — 82043 UR ALBUMIN QUANTITATIVE: CPT | Performed by: INTERNAL MEDICINE

## 2024-05-29 PROCEDURE — 99214 OFFICE O/P EST MOD 30 MIN: CPT | Performed by: INTERNAL MEDICINE

## 2024-05-29 PROCEDURE — 36415 COLL VENOUS BLD VENIPUNCTURE: CPT | Performed by: INTERNAL MEDICINE

## 2024-05-29 PROCEDURE — 82570 ASSAY OF URINE CREATININE: CPT | Performed by: INTERNAL MEDICINE

## 2024-05-29 PROCEDURE — 80048 BASIC METABOLIC PNL TOTAL CA: CPT | Performed by: INTERNAL MEDICINE

## 2024-05-29 PROCEDURE — 81001 URINALYSIS AUTO W/SCOPE: CPT | Performed by: INTERNAL MEDICINE

## 2024-05-29 PROCEDURE — 87086 URINE CULTURE/COLONY COUNT: CPT | Performed by: INTERNAL MEDICINE

## 2024-05-29 PROCEDURE — 83036 HEMOGLOBIN GLYCOSYLATED A1C: CPT | Performed by: INTERNAL MEDICINE

## 2024-05-29 PROCEDURE — G2211 COMPLEX E/M VISIT ADD ON: HCPCS | Performed by: INTERNAL MEDICINE

## 2024-05-29 PROCEDURE — 80061 LIPID PANEL: CPT | Performed by: INTERNAL MEDICINE

## 2024-05-29 RX ORDER — NITROFURANTOIN 25; 75 MG/1; MG/1
100 CAPSULE ORAL 2 TIMES DAILY
Qty: 14 CAPSULE | Refills: 0 | Status: SHIPPED | OUTPATIENT
Start: 2024-05-29

## 2024-05-29 RX ORDER — RESPIRATORY SYNCYTIAL VIRUS VACCINE 120MCG/0.5
0.5 KIT INTRAMUSCULAR ONCE
Qty: 1 EACH | Refills: 0 | Status: CANCELLED | OUTPATIENT
Start: 2024-05-29 | End: 2024-05-29

## 2024-05-29 ASSESSMENT — PAIN SCALES - GENERAL: PAINLEVEL: MODERATE PAIN (4)

## 2024-05-29 NOTE — PROGRESS NOTES
Charleen Porter is a 84 year old, presenting for the following health issues:  Nose Bleeds and UTI (Burning with urination)      5/29/2024     2:31 PM   Additional Questions   Roomed by Carmen VOGEL     History of Present Illness       Reason for visit:  Nose bleeds    She eats 4 or more servings of fruits and vegetables daily.She consumes 0 sweetened beverage(s) daily.She exercises with enough effort to increase her heart rate 30 to 60 minutes per day.  She exercises with enough effort to increase her heart rate 5 days per week.   She is taking medications regularly.           EMR reviewed including:             Complaint, History of Chief Complaint, Corresponding Review of Systems, and Complaint Specific Physical Examination.    #1   Patient has had a bloody nose off-and-on over the last week.  Did not have any bleeding today or yesterday.  Describes mild anterior bleed on the right.          Exam:  Mild septal crust is noted.  No current epistaxis.      #2   Patient complains of burning, frequency, urgency of urination.  Present for the last 3 days.  Notes urine is foul-smelling.          Exam:   URINARY: Conrado's punch is negative.  Mild suprapubic tenderness is noted with palpation.   GI: Abdomen is soft, without rebound, guarding or tenderness. Bowel sounds are appropriate. No renal bruits are heard.   Constitutional: The patient appears to be in no acute distress. The patient appears to be adequately hydrated. No acute respiratory or hemodynamic distress is noted at this time.      #3   Follow-up on type 2 diabetes  Patient continues metformin without difficulty.  Denies any diarrhea.  Denies polyuria or polydipsia.  Notes home blood sugars are generally well-controlled in the 130 range.  Last A1c was controlled at 7.1.  It was down from the previous 11.7 in 2022.          Exam:   HEART:  regular without rubs, clicks, gallops, or murmurs. PMI is nondisplaced. Upstrokes are brisk. S1,S2 are  "heard.   LUNGS: clear bilaterally, airflow is brisk, no intercostal retraction or stridor is noted. No coughing is noted during visit.   NEURO: Pt is alert and appropriate. No neurologic lateralization is noted. Cranial nerves 2-12 are intact. Peripheral sensory function is intact in the feet.        Patient has been interviewed, applicable history and applied review of systems have been performed.    Vital Signs:   /80 (BP Location: Right arm, Patient Position: Chair)   Pulse 72   Temp 97.8  F (36.6  C) (Temporal)   Resp 18   Ht 1.603 m (5' 3.1\")   Wt 58.6 kg (129 lb 1.6 oz)   SpO2 98%   BMI 22.80 kg/m        Decision Making    Problem and Complexity     1. Epistaxis  Discussed nasal compression for minor bleeds.  Recommend small dab of Vaseline once or twice daily for barrier protectant.  Proximity to the house to try to maintain 30 to 50%.      2. CKD (chronic kidney disease) stage 2, GFR 60-89 ml/min  Stable.  Continue current medication    3. Acute cystitis without hematuria  Check urinary analysis for culture.  Start nitrofurantoin empirically.  - UA Macroscopic with reflex to Microscopic and Culture - Lab Collect; Future  - nitroFURantoin macrocrystal-monohydrate (MACROBID) 100 MG capsule; Take 1 capsule (100 mg) by mouth 2 times daily  Dispense: 14 capsule; Refill: 0    4. Type 2 diabetes mellitus with diabetic polyneuropathy, without long-term current use of insulin (H)  Check lab work including A1c.  Continue current medication adjust accordingly  - Hemoglobin A1c; Future  - Basic metabolic panel  (Ca, Cl, CO2, Creat, Gluc, K, Na, BUN); Future  - Albumin Random Urine Quantitative with Creat Ratio; Future    5. Diabetic polyneuropathy associated with type 2 diabetes mellitus (H)  Mild.  Observe    6. Mixed conductive and sensorineural hearing loss of both ears  Patient's hearing aid is persistently playing music.  Her audiologist is out of town.  She will be seeing an audiologist in [facility]" to stop the music.    7. Hyperlipidemia LDL goal <100  Check lipid levels  Continue statin.  Watch diet lose that is  - Lipid panel reflex to direct LDL Fasting; Future                                FOLLOW UP   I have asked the patient to make an appointment for followup with me in 4 months or as predicated by results    Regarding routine vaccinations:  I have reviewed the patient's vaccination schedule and discussed the benefits of prophylactic vaccination in detail.  I recommend the patient contact their pharmacist for vaccinations.  Discussed that most insurance companies now favor reimbursement to the pharmacies and it will financially behoove the patient to have vaccinations performed at their pharmacy.        I have carefully explained the diagnosis and treatment options to the patient.  The patient has displayed an understanding of the above, and all subsequent questions were answered.      DO JT Thompson    Portions of this note were produced using [employer]  Although every attempt at real-time proof reading has been made, occasional grammar/syntax errors may have been missed.  .

## 2024-05-29 NOTE — TELEPHONE ENCOUNTER
Reason for Call:  Other appointment    Detailed comments: patient is having issues with her right hearing aid not working. She is also wondering why she is hearing music in her left hearing aid all the time? She came to [place] to drop off the right hearing aid but decided to wait since Diana will not be here until 06/14. Wondering if we might be able to call her and see if this can be figured out over the phone?     Phone Number Patient can be reached at: Home number on file [phone] (home)    Best Time: any    Can we leave a detailed message on this number? YES    Call taken on 5/29/2024 at 1:58 PM by Maryellen Mackey

## 2024-05-30 NOTE — TELEPHONE ENCOUNTER
Called patient and left a message. It is likely that the music is due to the trial period on the demo devices expiring. The patient was encouraged to drop off the demo devices. She is scheduled for another follow up in July when Courtney will be back in the office. She was encouraged to call scheduling if she would like to see if she can be scheduled earlier.    Mynor Solis, CCC-A  [place] Licensed Audiologist #[id number]  5/30/2024

## 2024-05-31 LAB — BACTERIA UR CULT: NORMAL

## 2024-06-03 ENCOUNTER — PATIENT OUTREACH (OUTPATIENT)
Dept: CARE COORDINATION | Facility: CLINIC | Age: 84
End: 2024-06-03
Payer: COMMERCIAL

## 2024-06-03 NOTE — PROGRESS NOTES
Clinic Care Coordination Contact  Care Team Conversations    RN CC received a voicemail from patient asking for a call back.     Patient is not enrolled in care coordination.     RN CC called and spoke to the patient. She said she was calling before her scheduled appointment with Dr. Villa on 5/29/2024. RN CC unsure how patient got RN CC's phone number.     Patient said all of her needs were taken care of at her appointment. No needs for RN CC at this time.     Raya Garrett RN Care Coordination   [facility] Ran Betancur  Email: [email or web address]  Phone: [phone]

## 2024-07-06 DIAGNOSIS — G45.9 TIA (TRANSIENT ISCHEMIC ATTACK): ICD-10-CM

## 2024-07-08 RX ORDER — ATORVASTATIN CALCIUM 40 MG/1
TABLET, FILM COATED ORAL
Qty: 90 TABLET | Refills: 2 | Status: SHIPPED | OUTPATIENT
Start: 2024-07-08

## 2024-07-25 ENCOUNTER — APPOINTMENT (OUTPATIENT)
Dept: AUDIOLOGY | Facility: CLINIC | Age: 84
End: 2024-07-25
Payer: COMMERCIAL

## 2024-07-25 DIAGNOSIS — H90.3 SENSORINEURAL HEARING LOSS, BILATERAL: Primary | ICD-10-CM

## 2024-07-25 PROCEDURE — V5010 ASSESSMENT FOR HEARING AID: HCPCS | Performed by: AUDIOLOGIST

## 2024-08-03 ENCOUNTER — HEALTH MAINTENANCE LETTER (OUTPATIENT)
Age: 84
End: 2024-08-03

## 2024-08-04 DIAGNOSIS — E11.9 TYPE 2 DIABETES MELLITUS WITHOUT COMPLICATION, WITHOUT LONG-TERM CURRENT USE OF INSULIN (H): ICD-10-CM

## 2024-08-05 RX ORDER — METFORMIN HCL 500 MG
500 TABLET, EXTENDED RELEASE 24 HR ORAL 2 TIMES DAILY WITH MEALS
Qty: 180 TABLET | Refills: 3 | OUTPATIENT
Start: 2024-08-05

## 2024-08-20 DIAGNOSIS — E11.9 TYPE 2 DIABETES MELLITUS WITHOUT COMPLICATION, WITHOUT LONG-TERM CURRENT USE OF INSULIN (H): ICD-10-CM

## 2024-08-20 RX ORDER — METFORMIN HCL 500 MG
500 TABLET, EXTENDED RELEASE 24 HR ORAL 2 TIMES DAILY WITH MEALS
Qty: 180 TABLET | Refills: 3 | OUTPATIENT
Start: 2024-08-20

## 2024-08-21 DIAGNOSIS — E11.9 TYPE 2 DIABETES MELLITUS WITHOUT COMPLICATION, WITHOUT LONG-TERM CURRENT USE OF INSULIN (H): ICD-10-CM

## 2024-08-21 RX ORDER — METFORMIN HCL 500 MG
500 TABLET, EXTENDED RELEASE 24 HR ORAL 2 TIMES DAILY WITH MEALS
Qty: 180 TABLET | Refills: 3 | OUTPATIENT
Start: 2024-08-21

## 2024-08-23 DIAGNOSIS — N95.2 ATROPHIC VAGINITIS: ICD-10-CM

## 2024-08-23 RX ORDER — ESTRADIOL 0.1 MG/G
CREAM VAGINAL
Qty: 43 G | Refills: 0 | Status: SHIPPED | OUTPATIENT
Start: 2024-08-23

## 2024-09-03 ENCOUNTER — OFFICE VISIT (OUTPATIENT)
Dept: AUDIOLOGY | Facility: CLINIC | Age: 84
End: 2024-09-03
Payer: COMMERCIAL

## 2024-09-03 NOTE — PROGRESS NOTES
AUDIOLOGY REPORT     BACKGROUND:  Charlotte was seen after trial with new hearing aid technology, ReSound Quattro 9 Previous evaluation 4/2/2024 mild to moderate to severe sensorineural hearing loss right ear and moderate sloping to severe sensorineural hearing loss left ear. Fair word understanding in quiet 66% right, 64% left, and 90% binaural, 50-word list.     She reported she was able to hear better initially, then seemed as though she needed to turn up the volume. She has some questions about the lisbeth, and amptify. She also noted when she tries to use streaming for phone calls, others tell her it sounds staticy.     RESULTS:  Adjusted programming of ReSound devices to match NAL-NL2 prescriptive targets re: 4/2024 audio. She noted sound was balanced and speech was comfortable.     Reviewed how to make volume adjustments and use the speech enhancer, in addition to using speech clarity and noise filter settings. Reviewed updated to [employer] lisbeth.         RECOMMENDATIONS:  Return in 2-3 weeks for follow up on hearing aid trial. Continue use of [employer] lisbeth, for listening practice. Recommended trying phone streaming again.         Mynor Ordonez Licensed Audiologist #[id number]

## 2024-09-19 ENCOUNTER — OFFICE VISIT (OUTPATIENT)
Dept: AUDIOLOGY | Facility: CLINIC | Age: 84
End: 2024-09-19
Payer: COMMERCIAL

## 2024-09-19 DIAGNOSIS — H90.3 SENSORINEURAL HEARING LOSS, BILATERAL: Primary | ICD-10-CM

## 2024-09-19 PROCEDURE — V5275 EAR IMPRESSION: HCPCS | Mod: RT | Performed by: AUDIOLOGIST

## 2024-09-19 PROCEDURE — 92591 PR HEARING AID EXAM BINAURAL: CPT | Performed by: AUDIOLOGIST

## 2024-09-19 NOTE — PROGRESS NOTES
AUDIOLOGY REPORT    SUBJECTIVE:   Charlotte was seen after trial with new hearing aid technology, ReSound Quattro 9, matched to NAL-NL2 prescriptive targets. 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 Fair word understanding in quiet 66% right, 64% left, and 90% binaural, 50-word list.     She reported improved clarity, and family improved ability to hear at family gatherings as well. She would like to proceed with picking out new hearing aids.    OBJECTIVE:  Patient is a hearing aid candidate. Patient would like to move forward with a hearing aid evaluation today. Therefore, the patient was presented with different options for amplification to help aid in communication. Discussed styles, levels of technology and monaural vs. binaural fitting.     The hearing aid(s) mutually chosen were:  Binaural: ReSound Nexia 960-S  COLOR: Bronze  BATTERY SIZE: rechargeable  EARMOLD/TIPS: canal  CANAL/ LENGTH: 2 M    Otoscopy revealed ears are clear of cerumen bilaterally. Bilateral earmolds were taken without incident.    ASSESSMENT:     ICD-10-CM    1. Sensorineural hearing loss, bilateral  H90.3 Hearing Aid Exam, Binaural (32708)     Ear Impression, Each ()          Reviewed purchase information and warranty information with patient. The 45 day trial period was explained to patient. The patient was given a copy of the [place] Department of [employer] consumer brochure on purchasing hearing instruments. Patient risk factors have been provided to the patient in writing prior to the sale of the hearing aid per FDA regulation. The risk factors are also available in the User Instructional Booklet to be presented on the day of the hearing aid fitting. Hearing aid(s) ordered. Hearing aid evaluation completed.    PLAN: JANIYA valerio is scheduled to return in 2-3 weeks for a hearing aid fitting and programming.  Purchase agreement will be completed on that date. Please contact this clinic with any questions or concerns.      Cecilia Ordonez.  [place] Licensed Audiologist #[id number]

## 2024-10-01 ENCOUNTER — TELEPHONE (OUTPATIENT)
Dept: AUDIOLOGY | Facility: CLINIC | Age: 84
End: 2024-10-01

## 2024-10-01 NOTE — TELEPHONE ENCOUNTER
Reason for Call:  Other appointment    Detailed comments: Charlotte didn't get the message about being worked in for today, Oct 1st until after the appt opportunity.     She's wondering if she can be worked in next week, anytime but Wednesday morning or all day Friday, the 11th.     Thank you!    Phone Number Patient can be reached at: Cell number on file:    Telephone Information:   Mobile [phone]       Best Time: any    Can we leave a detailed message on this number? YES    Call taken on 10/1/2024 at 1:42 PM by Abdirahman Grant

## 2024-10-07 NOTE — TELEPHONE ENCOUNTER
Please call Charlotte and see if she can come tomorrow at 3:00 for her hearing aid fitting. Thanks, Courtney

## 2024-10-08 ENCOUNTER — OFFICE VISIT (OUTPATIENT)
Dept: AUDIOLOGY | Facility: CLINIC | Age: 84
End: 2024-10-08
Payer: COMMERCIAL

## 2024-10-08 DIAGNOSIS — H90.3 BILATERAL SENSORINEURAL HEARING LOSS: Primary | ICD-10-CM

## 2024-10-08 PROCEDURE — V5261 HEARING AID, DIGIT, BIN, BTE: HCPCS | Performed by: AUDIOLOGIST

## 2024-10-08 PROCEDURE — V5264 EAR MOLD/INSERT: HCPCS | Mod: RT | Performed by: AUDIOLOGIST

## 2024-10-08 PROCEDURE — V5020 CONFORMITY EVALUATION: HCPCS | Mod: RT | Performed by: AUDIOLOGIST

## 2024-10-08 PROCEDURE — V5011 HEARING AID FITTING/CHECKING: HCPCS | Performed by: AUDIOLOGIST

## 2024-10-08 PROCEDURE — V5160 DISPENSING FEE BINAURAL: HCPCS | Performed by: AUDIOLOGIST

## 2024-10-08 NOTE — PATIENT INSTRUCTIONS

## 2024-10-08 NOTE — PROGRESS NOTES
AUDIOLOGY REPORT    SUBJECTIVE:   Charlotte Snow, a 84 year old female, was seen in the Audiology Clinic at [facility] today for a binaural ReSound Nexia 960S-DRWC hearing aid fitting. 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 Fair word understanding in quiet 66% right, 64% left, and 90% binaural, 50-word list.     Charlotte has been in Codewise Quattro 9 MARYBETH hearing aids matched to NAL-NL2 prescriptive targets w/ tulip domes. She noted some feedback and others struggling to hear her on the phone (needed to use speaker phone).    OBJECTIVE:    Prior to fitting, a hearing aid check was performed to ensure device functionality. The hearing aid conformity evaluation was completed.The hearing aids were placed and they provided a good fit. Real-ear-probe-microphone measurements were completed on the [employer] system and were a good match to NAL-NL2 target with soft sounds audible, moderate sounds comfortable, and loud sounds below discomfort. UCLs are verified through maximum power output measures and demonstrate appropriate limiting of loud inputs. Ms. Snow was oriented to proper hearing aid use, care, cleaning (no water, dry brush), batteries (size rechargeable, insertion/removal, toxicity, low-battery signal), aid insertion/removal, user booklet, warranty information, storage cases, and other hearing aid details. The patient confirmed understanding of hearing aid use and care, and showed proper insertion of hearing aid and batteries while in the office today. Ms. Snow reported good volume and sound quality today.        EAR(S) FIT:    HEARING AID MAKE: Right: ReSound; Left: ReSound    HEARING AID MODEL: Right:  Nexia 960S-DRWC; Left: Nexia 960S-DRWC  HEARING AID STYLE: Right: BTE/MARYBETH; Left: BTE/MARYBETH    LENGTH: Right: 2 MP; Left: 2 MP    EARMOLDS: Right: ReSound  Canal Lock Silicone SN: [id number]; Left: ReSound Canal Lock Silicone SN: [id number]   SERIAL NUMBERS: Right: [id number]; Left: [id number]  WARRANTY END DATE: Right: 10/20/2027; Left: 10/20/2027    The patient signed a waiver per their insurance contract. Scanned to Epic.    ASSESSMENT:   Binaural ReSound Nexia 960S-[facility] hearing aid fitting completed today. Verification measures were performed. The 45 day trial period was explained to patient, and they expressed understanding. Ms. Snwo signed the Hearing Aid Purchase Agreement and was given a copy, as well as details on her hearing aids. Patient was counseled that exact out of pocket amounts cannot be determined for hearing aid claims being sent to insurance. Any insurance coverage information presented to the patient is an estimate only, and is not a guarantee of payment. Patient has been advised to check with their own insurance.    PLAN:   Ms. Snow will return for follow-up in 2-3 weeks for a hearing aid review appointment. Please call this clinic with questions regarding today s appointment.        Mynor Ordonez Licensed Audiologist #[id number]

## 2024-10-23 DIAGNOSIS — G45.9 TIA (TRANSIENT ISCHEMIC ATTACK): ICD-10-CM

## 2024-10-23 RX ORDER — CLOPIDOGREL BISULFATE 75 MG/1
TABLET ORAL
Qty: 90 TABLET | Refills: 3 | Status: SHIPPED | OUTPATIENT
Start: 2024-10-23

## 2024-10-28 ENCOUNTER — OFFICE VISIT (OUTPATIENT)
Dept: PHARMACY | Facility: CLINIC | Age: 84
End: 2024-10-28
Payer: COMMERCIAL

## 2024-10-28 VITALS — DIASTOLIC BLOOD PRESSURE: 68 MMHG | SYSTOLIC BLOOD PRESSURE: 110 MMHG

## 2024-10-28 DIAGNOSIS — G62.9 PERIPHERAL POLYNEUROPATHY: ICD-10-CM

## 2024-10-28 DIAGNOSIS — E11.9 TYPE 2 DIABETES MELLITUS WITHOUT COMPLICATION, WITHOUT LONG-TERM CURRENT USE OF INSULIN (H): Primary | ICD-10-CM

## 2024-10-28 DIAGNOSIS — M25.551 RIGHT HIP PAIN: ICD-10-CM

## 2024-10-28 DIAGNOSIS — E11.9 TYPE 2 DIABETES MELLITUS WITHOUT COMPLICATION, WITHOUT LONG-TERM CURRENT USE OF INSULIN (H): ICD-10-CM

## 2024-10-28 DIAGNOSIS — Z78.9 TAKES DIETARY SUPPLEMENTS: ICD-10-CM

## 2024-10-28 DIAGNOSIS — G45.9 TIA (TRANSIENT ISCHEMIC ATTACK): ICD-10-CM

## 2024-10-28 DIAGNOSIS — K21.9 GASTROESOPHAGEAL REFLUX DISEASE, UNSPECIFIED WHETHER ESOPHAGITIS PRESENT: ICD-10-CM

## 2024-10-28 DIAGNOSIS — E78.5 HYPERLIPIDEMIA LDL GOAL <100: ICD-10-CM

## 2024-10-28 PROCEDURE — 99207 PR NO CHARGE LOS: CPT | Performed by: PHARMACIST

## 2024-10-28 RX ORDER — METFORMIN HYDROCHLORIDE 500 MG/1
500 TABLET, EXTENDED RELEASE ORAL 2 TIMES DAILY WITH MEALS
Qty: 180 TABLET | Refills: 0 | Status: SHIPPED | OUTPATIENT
Start: 2024-10-28

## 2024-10-28 RX ORDER — CETIRIZINE HYDROCHLORIDE 10 MG/1
10 TABLET ORAL DAILY PRN
COMMUNITY

## 2024-10-28 RX ORDER — GLUCOSAMINE/D3/BOSWELLIA SERRA 1500MG-400
1 TABLET ORAL DAILY
COMMUNITY

## 2024-10-28 NOTE — PROGRESS NOTES
Medication Therapy Management (MTM) Encounter    ASSESSMENT:                            Medication Adherence/Access: See below for considerations.    Type 2 Diabetes/Neuropathy:  A1c is at goal of <8%. Would benefit from continuing current therapy.    History of stroke/Hyperlipidemia: Stable.     GERD: Stable.    Recurrent UTI: Stable per patient.     Pain/Supplements: Stable. No clear indication for rechecking vitamin levels at this time. Could increase acetaminophen frequency to help with pain.     PLAN:                            No indication to recheck your vitamin levels at this time.      Drop down your vitamin D3 5000 unit (125 mcg) to every other day since you are already getting a good amount from your multivitamin and calcium supplement    Okay to take acetaminophen (Tylenol) 1,000 mg three to four times daily as needed.     Follow-up: 1 year or sooner if needed    SUBJECTIVE/OBJECTIVE:                          Charlotte Snow is a 84 year old female seen for a follow-up visit.       Reason for visit: Comprehensive medication review.    Allergies/ADRs: Reviewed in chart  Past Medical History: Reviewed in chart  Tobacco: She reports that she has never smoked. She has never used smokeless tobacco.  Alcohol: none    Medication Adherence/Access: no issues reported    Diabetes   Type 2 Diabetes/Neuropathy:  Currently taking metformin  mg twice daily. Does take gabapentin 300 mg every evening for neuropathy but she has reduced her dose without increase in her pain and wants to try off. Patient is not experiencing side effects.  Blood sugar monitoring: Continuous Glucose Monitor. Ranges (patient reported):     Symptoms of low blood sugar? none  Symptoms of high blood sugar? none  Diet/Exercise: has really focused on her diet and activity. Has lost a significant amount of weight (intentional).  Feels strong per patient.      Eye exam is up to date  Foot exam: up to date  Lab Results   Component Value Date     A1C 7.3 05/29/2024    A1C 7.1 05/09/2023     History of stroke/Hyperlipidemia: Current therapy includes clopidogrel 75 mg daily and  atorvastatin 40 mg daily.  Patient reports no significant myalgias or other side effects.  The ASCVD Risk score (Audrey JOE, et al., 2019) failed to calculate for the following reasons:    The 2019 ASCVD risk score is only valid for ages 40 to 79    Risk score cannot be calculated because patient has a medical history suggesting prior/existing ASCVD  Recent Labs   Lab Test 05/29/24  1518 05/09/23  1108   CHOL 147 162   HDL 76 72   LDL 57 77   TRIG 71 63     BP Readings from Last 3 Encounters:   10/28/24 110/68   05/29/24 138/80   12/14/23 (!) 152/79     GERD: Current medications include: Protonix (pantoprazole) 40 mg once daily. Patient reports no current symptoms.  Patient feels that current regimen is effective.    Recurrent UTI: Patient is using estradiol vaginal cream twice weekly and cranberry 500 mg twice daily. Doing better . Also wondering about changing brands of cranberry. Denies any known side effects.    Pain/Supplements: Patient is taking vitamin B complex daily, calcium 600 with Vitamin D mg daily, biotin 39202 mcg daily, vitamin D3 125 mcg daily, probiotic twice daily, a daily multivitamin, and glucosamine/chondroitin twice daily. Does take Tylenol for pain twice daily but feels knee and hip getting worse. Wonders about testing vitamin levels. Denies any issues.    Today's Vitals: /68     BP Readings from Last 3 Encounters:   10/28/24 110/68   05/29/24 138/80   12/14/23 (!) 152/79     Wt Readings from Last 5 Encounters:   05/29/24 129 lb 1.6 oz (58.6 kg)   12/14/23 126 lb (57.2 kg)   11/27/23 126 lb (57.2 kg)   10/30/23 127 lb 3.2 oz (57.7 kg)   07/24/23 134 lb (60.8 kg)     ----------------      I spent 30 minutes with this patient today. A copy of the visit note was provided to the patient's provider(s).    A summary of these recommendations was given to the  patient.    Virgil Morgan, PharmD, [facility]  Medication Therapy Management Pharmacist  Voicemail: [phone]           Medication Therapy Recommendations  Right hip pain   1 Current Medication: acetaminophen (TYLENOL) 500 MG tablet   Current Medication Sig: Take 500-1,000 mg by mouth every 6 hours as needed for mild pain   Rationale: Dose too low - Dosage too low - Effectiveness   Recommendation: Increase Frequency   Status: Accepted - no CPA Needed   Identified Date: 10/28/2024 Completed Date: 10/28/2024

## 2024-10-28 NOTE — PATIENT INSTRUCTIONS
"Recommendations from today's MTM visit:                                                    MTM (medication therapy management) is a service provided by a clinical pharmacist designed to help you get the most of out of your medicines.      No indication to recheck your vitamin levels at this time.      Drop down your vitamin D3 5000 unit (125 mcg) to every other day since you are already getting a good amount from your multivitamin and calcium supplement    Okay to take acetaminophen (Tylenol) 1,000 mg three to four times daily as needed.     Follow-up: 1 year or sooner if needed    It was great speaking with you today.  I value your experience and would be very thankful for your time in providing feedback in our clinic survey. In the next few days, you may receive an email or text message from [employer] with a link to a survey related to your  clinical pharmacist.\"     To schedule another MTM appointment, please call the clinic directly or you may call the MTM scheduling line at [phone] or toll-free at [phone].     My Clinical Pharmacist's contact information:                                                      Please feel free to contact me with any questions or concerns you have.      Virgil Morgan, PharmD, BCACP  Medication Therapy Management Pharmacist  Voicemail: [phone]  "

## 2024-10-29 RX ORDER — GABAPENTIN 300 MG/1
300 CAPSULE ORAL 2 TIMES DAILY
Qty: 270 CAPSULE | Refills: 0 | Status: SHIPPED | OUTPATIENT
Start: 2024-10-29 | End: 2024-11-12

## 2024-11-01 ENCOUNTER — APPOINTMENT (OUTPATIENT)
Dept: AUDIOLOGY | Facility: CLINIC | Age: 84
End: 2024-11-01
Payer: COMMERCIAL

## 2024-11-01 DIAGNOSIS — H90.3 BILATERAL SENSORINEURAL HEARING LOSS: Primary | ICD-10-CM

## 2024-11-01 PROCEDURE — V5010 ASSESSMENT FOR HEARING AID: HCPCS | Performed by: AUDIOLOGIST

## 2024-11-04 DIAGNOSIS — G62.9 PERIPHERAL POLYNEUROPATHY: ICD-10-CM

## 2024-11-04 RX ORDER — GABAPENTIN 300 MG/1
300 CAPSULE ORAL 2 TIMES DAILY
Qty: 270 CAPSULE | Refills: 0 | OUTPATIENT
Start: 2024-11-04

## 2024-11-04 NOTE — TELEPHONE ENCOUNTER
"Received fax from pharmacy needing clarifications on directions. Says \"Take 1 capsule by mouth 2 times daily. Take 1 capsule three times daily.\"    Tammi Villarreal [facility]    "

## 2024-11-05 NOTE — TELEPHONE ENCOUNTER
The patient called and stated the pharmacy stated they have no more refills, so she needs a new script sent.    Coreen Cole LPN

## 2024-11-12 RX ORDER — GABAPENTIN 300 MG/1
300 CAPSULE ORAL 3 TIMES DAILY
Qty: 270 CAPSULE | Refills: 1 | Status: SHIPPED | OUTPATIENT
Start: 2024-11-12

## 2024-11-15 ENCOUNTER — HOSPITAL ENCOUNTER (OUTPATIENT)
Dept: MAMMOGRAPHY | Facility: CLINIC | Age: 84
Discharge: HOME | End: 2024-11-15
Attending: INTERNAL MEDICINE | Admitting: INTERNAL MEDICINE
Payer: MEDICARE

## 2024-11-15 ENCOUNTER — OFFICE VISIT (OUTPATIENT)
Dept: AUDIOLOGY | Facility: CLINIC | Age: 84
End: 2024-11-15
Payer: COMMERCIAL

## 2024-11-15 DIAGNOSIS — Z12.31 VISIT FOR SCREENING MAMMOGRAM: ICD-10-CM

## 2024-11-15 DIAGNOSIS — H90.3 BILATERAL SENSORINEURAL HEARING LOSS: Primary | ICD-10-CM

## 2024-11-15 DIAGNOSIS — E11.42 TYPE 2 DIABETES MELLITUS WITH DIABETIC POLYNEUROPATHY, WITHOUT LONG-TERM CURRENT USE OF INSULIN (H): ICD-10-CM

## 2024-11-15 PROCEDURE — 77063 BREAST TOMOSYNTHESIS BI: CPT

## 2024-11-15 RX ORDER — ACYCLOVIR 800 MG/1
TABLET ORAL
Qty: 6 EACH | Refills: 3 | Status: SHIPPED | OUTPATIENT
Start: 2024-11-15

## 2024-11-15 NOTE — TELEPHONE ENCOUNTER
Patient was told by pharmacy to call and request refill of Freestyle Justen 3 Sensor. States it's time to reorder. Thank you.

## 2024-11-15 NOTE — PROGRESS NOTES
AUDIOLOGY REPORT    SUBJECTIVE:  Charlotte Snow is a 84 year old female who was seen in the Audiology Clinic at the [facility] on 11/15/2024 for a follow-up check after fitting binaural ReSound Nexia 960S hearing aids 10/8/2024. 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 Fair word understanding in quiet 66% right, 64% left, and 90% binaural, 50-word list.     Charlotte notes she would like to keep the hearing aids and continue to receive hearing aid services here in [place]. She noted she is hearing well in general and was able to enjoy conversation at a cafe with two couples yesterday. She noted others are able to hear her better on the phone after programming adjustments at her last visit.    OBJECTIVE:   Based on patient report, the following changes were made; no programming changes were made. Did change from #2 M  to #1 per her request.    Reviewed 45 day trial period, care, cleaning (no water, dry brush), batteries (size rechargeable) insertion/removal, toxicity, low-battery signal), aid insertion/removal, volume adjustment (if applicable), user booklet, warranty information, storage cases, and other hearing aid details.     No charge visit today (in warranty hearing aid check).    ASSESSMENT:   A follow-up appointment for hearing aid fitting was completed today. Changes to hearing aid was completed as outlined above.     PLAN:  Charlotte will return for hearing aid maintenance in 3-4 months, sooner if needed. Please call this clinic with any questions regarding today s appointment.    Cecilia Ordonez.  [place] Licensed Audiologist #[id number]

## 2024-11-15 NOTE — TELEPHONE ENCOUNTER
Received fax from pharmacy that amor 3 sensors on backorder. They do have 3+. Tayod, calling pharmacy to approve of substitute and prescription was transferred to another pharmacy.     Patient had prescription transferred to [facility]. Writer spoke with both [facility] pharmacy in [place]. Provider verbal approval to fill amor 3 sensor plus for Dr. Villa.     Spoke with patient and relayed update to [employer] plus 3.     Tomas Duke RN on 11/15/2024 at 4:31 PM

## 2024-11-20 ENCOUNTER — OFFICE VISIT (OUTPATIENT)
Dept: INTERNAL MEDICINE | Facility: CLINIC | Age: 84
End: 2024-11-20
Payer: COMMERCIAL

## 2024-11-20 VITALS
WEIGHT: 133.4 LBS | TEMPERATURE: 96.9 F | OXYGEN SATURATION: 97 % | SYSTOLIC BLOOD PRESSURE: 114 MMHG | HEART RATE: 69 BPM | HEIGHT: 64 IN | BODY MASS INDEX: 22.77 KG/M2 | RESPIRATION RATE: 18 BRPM | DIASTOLIC BLOOD PRESSURE: 68 MMHG

## 2024-11-20 DIAGNOSIS — Z78.0 MENOPAUSE PRESENT: ICD-10-CM

## 2024-11-20 DIAGNOSIS — E11.42 DIABETIC POLYNEUROPATHY ASSOCIATED WITH TYPE 2 DIABETES MELLITUS (H): Primary | ICD-10-CM

## 2024-11-20 DIAGNOSIS — Z23 NEED FOR PROPHYLACTIC VACCINATION AND INOCULATION AGAINST INFLUENZA: ICD-10-CM

## 2024-11-20 DIAGNOSIS — M85.89 OSTEOPENIA OF MULTIPLE SITES: ICD-10-CM

## 2024-11-20 DIAGNOSIS — T14.8XXA BRUISING: ICD-10-CM

## 2024-11-20 LAB
ALBUMIN SERPL BCG-MCNC: 4.4 G/DL (ref 3.5–5.2)
ALP SERPL-CCNC: 75 U/L (ref 40–150)
ALT SERPL W P-5'-P-CCNC: 23 U/L (ref 0–50)
ANION GAP SERPL CALCULATED.3IONS-SCNC: 10 MMOL/L (ref 7–15)
AST SERPL W P-5'-P-CCNC: 20 U/L (ref 0–45)
BILIRUB DIRECT SERPL-MCNC: <0.2 MG/DL (ref 0–0.3)
BILIRUB SERPL-MCNC: 0.5 MG/DL
BUN SERPL-MCNC: 20.8 MG/DL (ref 8–23)
CALCIUM SERPL-MCNC: 9 MG/DL (ref 8.8–10.4)
CHLORIDE SERPL-SCNC: 98 MMOL/L (ref 98–107)
CREAT SERPL-MCNC: 0.94 MG/DL (ref 0.51–0.95)
EGFRCR SERPLBLD CKD-EPI 2021: 60 ML/MIN/1.73M2
ERYTHROCYTE [DISTWIDTH] IN BLOOD BY AUTOMATED COUNT: 13.5 % (ref 10–15)
EST. AVERAGE GLUCOSE BLD GHB EST-MCNC: 157 MG/DL
GLUCOSE SERPL-MCNC: 118 MG/DL (ref 70–99)
HBA1C MFR BLD: 7.1 %
HCO3 SERPL-SCNC: 26 MMOL/L (ref 22–29)
HCT VFR BLD AUTO: 35.2 % (ref 35–47)
HGB BLD-MCNC: 11.9 G/DL (ref 11.7–15.7)
INR PPP: 0.97 (ref 0.85–1.15)
MCH RBC QN AUTO: 29.1 PG (ref 26.5–33)
MCHC RBC AUTO-ENTMCNC: 33.8 G/DL (ref 31.5–36.5)
MCV RBC AUTO: 86 FL (ref 78–100)
PLATELET # BLD AUTO: 270 10E3/UL (ref 150–450)
POTASSIUM SERPL-SCNC: 4 MMOL/L (ref 3.4–5.3)
PROT SERPL-MCNC: 6.9 G/DL (ref 6.4–8.3)
RBC # BLD AUTO: 4.09 10E6/UL (ref 3.8–5.2)
SODIUM SERPL-SCNC: 134 MMOL/L (ref 135–145)
WBC # BLD AUTO: 7 10E3/UL (ref 4–11)

## 2024-11-20 PROCEDURE — 85610 PROTHROMBIN TIME: CPT | Performed by: INTERNAL MEDICINE

## 2024-11-20 PROCEDURE — 36415 COLL VENOUS BLD VENIPUNCTURE: CPT | Performed by: INTERNAL MEDICINE

## 2024-11-20 PROCEDURE — 85027 COMPLETE CBC AUTOMATED: CPT | Performed by: INTERNAL MEDICINE

## 2024-11-20 PROCEDURE — 80053 COMPREHEN METABOLIC PANEL: CPT | Performed by: INTERNAL MEDICINE

## 2024-11-20 PROCEDURE — 82248 BILIRUBIN DIRECT: CPT | Performed by: INTERNAL MEDICINE

## 2024-11-20 PROCEDURE — 83036 HEMOGLOBIN GLYCOSYLATED A1C: CPT | Performed by: INTERNAL MEDICINE

## 2024-11-20 ASSESSMENT — PAIN SCALES - GENERAL: PAINLEVEL_OUTOF10: MODERATE PAIN (5)

## 2024-11-20 NOTE — PROGRESS NOTES
Charleen Porter is a 84 year old, presenting for the following health issues:  Diabetes, Nail Problem (Fungal problem ), Abnormal Bleeding Problem, and Knee Pain      11/20/2024    12:39 PM   Additional Questions   Roomed by Azam         11/20/2024    12:39 PM   Patient Reported Additional Medications   Patient reports taking the following new medications Nonyx nail gel     History of Present Illness       Diabetes:   She presents for follow up of diabetes.  She is checking home blood glucose two times daily.   She checks blood glucose at bedtime.  Blood glucose is sometimes over 200 and sometimes under 70.  When her blood glucose is low, the patient is asymptomatic for confusion, blurred vision, lethargy and reports not feeling dizzy, shaky, or weak.   She has no concerns regarding her diabetes at this time.   She is not experiencing numbness or burning in feet, excessive thirst, blurry vision, weight changes or redness, sores or blisters on feet.           She eats 4 or more servings of fruits and vegetables daily.She consumes 0 sweetened beverage(s) daily.She exercises with enough effort to increase her heart rate 20 to 29 minutes per day.  She exercises with enough effort to increase her heart rate 6 days per week.   She is taking medications regularly.                EMR reviewed including:             Complaint, History of Chief Complaint, Corresponding Review of Systems, and Complaint Specific Physical Examination.    #1   Follow-up on type 2 diabetes  Continues to continue glucose monitoring.  Has had a couple hypoglycemic episodes that were not sensed.  She picked these up on her continuous glucose monitor.  Hypoglycemic episodes are generally associated with missing a meal.  Continues to take metformin only.  Is on statin, Plavix.  Denies polyuria or polydipsia  Denies frequency, urgency or hesitancy.          Exam:   LUNGS: clear bilaterally, airflow is brisk, no intercostal retraction or  "stridor is noted. No coughing is noted during visit.   HEART:  regular without rubs, clicks, gallops, or murmurs. PMI is nondisplaced. Upstrokes are brisk. S1,S2 are heard.   GI: Abdomen is soft, without rebound, guarding or tenderness. Bowel sounds are appropriate. No renal bruits are heard.   NEURO: Pt is alert and appropriate. No neurologic lateralization is noted. Cranial nerves 2-12 are intact. Peripheral sensory function is decreased in the feet.   Feet: no evidence of skin breakdown or ulceration is noted.  Sensation in the feet is decreased.  Evidence of callus formation with preulcers are noted.  Patient does require orthotic footwear with appropriate inserts.    #2   Onychomycosis  Well-controlled with grooming and cutting.  Discussed potential risk of medication.  Patient will continue to use her over-the-counter medication.      #3   Easy bruising and bleeding.  Notes that she bleeds for several minutes after placing her continuous glucose monitor on.  Has had some increased bruising in the dorsal and sometimes ventral aspect of her forearms.  Bruises have been small.  Patient does take Plavix.          Exam:   SKIN:  warm and dry. No erythema, or rashes are noted. No specific lesions of concern are noted.   No bruises are noted at this time.      Patient has been interviewed, applicable history and applied review of systems have been performed.    Vital Signs:   /68 (BP Location: Right arm, Patient Position: Sitting, Cuff Size: Adult Regular)   Pulse 69   Temp 96.9  F (36.1  C) (Temporal)   Resp 18   Ht 1.615 m (5' 3.58\")   Wt 60.5 kg (133 lb 6.4 oz)   SpO2 97%   BMI 23.20 kg/m        Decision Making    Problem and Complexity     1. Diabetic polyneuropathy associated with type 2 diabetes mellitus (H) (Primary)  Check lab work.  Continue current diabetic medications and adjust accordingly.  Reinforced the need for low carbohydrate diet.  - HEMOGLOBIN A1C; Future  - Orthotics, Mastectomy and " Custom Compression Orders  - Basic metabolic panel  (Ca, Cl, CO2, Creat, Gluc, K, Na, BUN); Future  - Hepatic panel (Albumin, ALT, AST, Bili, Alk Phos, TP); Future  - HEMOGLOBIN A1C  - Basic metabolic panel  (Ca, Cl, CO2, Creat, Gluc, K, Na, BUN)  - Hepatic panel (Albumin, ALT, AST, Bili, Alk Phos, TP)    2. Osteopenia of multiple sites  Will set up bone density studies to evaluate risk for osteoporotic fracture    3. Bruising  Check CBC and INR.  - CBC with platelets; Future  - INR; Future  - CBC with platelets  - INR    4. Menopause present  As above    - DX Bone Density; Future    5. Need for prophylactic vaccination and inoculation against influenza  Flu shot given                                FOLLOW UP   I have asked the patient to make an appointment for followup with me in 4 months    Regarding routine vaccinations:  I have reviewed the patient's vaccination schedule and discussed the benefits of prophylactic vaccination in detail.  I recommend the patient contact their pharmacist for vaccinations.  Discussed that most insurance companies now favor reimbursement to the pharmacies and it will financially behoove the patient to have vaccinations performed at their pharmacy.        I have carefully explained the diagnosis and treatment options to the patient.  The patient has displayed an understanding of the above, and all subsequent questions were answered.      DO JT Thompson    Portions of this note were produced using [employer]  Although every attempt at real-time proof reading has been made, occasional grammar/syntax errors may have been missed.

## 2024-11-21 ENCOUNTER — PATIENT OUTREACH (OUTPATIENT)
Dept: CARE COORDINATION | Facility: CLINIC | Age: 84
End: 2024-11-21
Payer: COMMERCIAL

## 2024-11-25 ENCOUNTER — OFFICE VISIT (OUTPATIENT)
Dept: AUDIOLOGY | Facility: CLINIC | Age: 84
End: 2024-11-25
Payer: COMMERCIAL

## 2024-11-25 DIAGNOSIS — H90.3 BILATERAL SENSORINEURAL HEARING LOSS: Primary | ICD-10-CM

## 2024-11-25 PROCEDURE — V5010 ASSESSMENT FOR HEARING AID: HCPCS | Performed by: AUDIOLOGIST

## 2024-11-26 NOTE — PROGRESS NOTES
AUDIOLOGY REPORT     SUBJECTIVE:  Charlotte Snow is a 84 year old female who was seen in the Audiology Clinic at the [facility] on 11/25/2024 for a hearing check. She was fit with binaural ReSound Nexia 960S hearing aids 10/8/2024. 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 Fair word understanding in quiet 66% right, 64% left, and 90% binaural, 50-word list.      Charlotte notes she is getting feedback especially when her hands are close to the hearing aids. Also she would like the push button on the hearing aids deactivated, she would like to only use the remote feature on the lisbeth.     OBJECTIVE:   Based on patient report, the following changes were made; occluded vent to pressure right and left earmolds, ran feedback calibration and removed minimal wax from the right ear when the feedback calibration looked off. Improved after wax removed. Deactivated push button.    She noted almost no feedback after changes.     No charge visit today (in warranty hearing aid check).     ASSESSMENT:   A follow-up appointment for hearing aid fitting was completed today. Changes to hearing aid was completed as outlined above.      PLAN:  Charlotte will return for hearing aid maintenance in 3 months, sooner if needed. Please call this clinic with any questions regarding today s appointment.     Cecilia Ordonez.  [place] Licensed Audiologist #[id number]

## 2024-12-03 ENCOUNTER — OFFICE VISIT (OUTPATIENT)
Dept: AUDIOLOGY | Facility: CLINIC | Age: 84
End: 2024-12-03
Payer: COMMERCIAL

## 2024-12-03 DIAGNOSIS — H90.3 BILATERAL SENSORINEURAL HEARING LOSS: Primary | ICD-10-CM

## 2024-12-03 PROCEDURE — V5010 ASSESSMENT FOR HEARING AID: HCPCS | Performed by: AUDIOLOGIST

## 2024-12-03 NOTE — PROGRESS NOTES
AUDIOLOGY REPORT     SUBJECTIVE:  Charlotte Snow is a 84 year old female who was seen in the Audiology Clinic at the [facility] on 12/3/2024 for a hearing check. She was fit with binaural ReSound Nexia 960S hearing aids 10/8/2024. 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 Fair word understanding in quiet 66% right, 64% left, and 90% binaural, 50-word list.      Charlotte notes she had an episode of loud feedback when she was near a door at her friends' house, could not replicate using a wall at the friend's house and could not replicate at her house. She notes TV is not clear and wonders if Speech Clarity on the lisbeth may help.     OBJECTIVE:   Based on patient report, the following changes were made; no programming changes.    Otoscopic exam showed mild non-occluding wax left and right removed without incident using a lighted currette. Left was positioned in the superior area of the canal where the  exits the earmold.     Hearing aid maintenance was completed to replace wax filters right and left, left was moderately occluded.     No charge visit today (in warranty hearing aid check).     ASSESSMENT:   A follow-up appointment for hearing aid fitting was completed today. Changes to hearing aid was completed as outlined above.      PLAN:  Charlotte will return for hearing aid maintenance in 3 months, sooner if needed. Please call this clinic with any questions regarding today s appointment.     Cecilia Ordonez.  [place] Licensed Audiologist #[id number]

## 2024-12-09 ENCOUNTER — TELEPHONE (OUTPATIENT)
Dept: INTERNAL MEDICINE | Facility: CLINIC | Age: 84
End: 2024-12-09
Payer: COMMERCIAL

## 2024-12-09 DIAGNOSIS — E11.42 DIABETIC POLYNEUROPATHY ASSOCIATED WITH TYPE 2 DIABETES MELLITUS (H): Primary | ICD-10-CM

## 2024-12-12 ENCOUNTER — NURSE TRIAGE (OUTPATIENT)
Dept: INTERNAL MEDICINE | Facility: CLINIC | Age: 84
End: 2024-12-12

## 2024-12-12 ENCOUNTER — OFFICE VISIT (OUTPATIENT)
Dept: INTERNAL MEDICINE | Facility: CLINIC | Age: 84
End: 2024-12-12
Payer: COMMERCIAL

## 2024-12-12 VITALS
DIASTOLIC BLOOD PRESSURE: 60 MMHG | OXYGEN SATURATION: 97 % | RESPIRATION RATE: 24 BRPM | HEART RATE: 73 BPM | HEIGHT: 64 IN | WEIGHT: 134 LBS | TEMPERATURE: 97.2 F | SYSTOLIC BLOOD PRESSURE: 106 MMHG | BODY MASS INDEX: 22.88 KG/M2

## 2024-12-12 DIAGNOSIS — Z29.11 NEED FOR VACCINATION AGAINST RESPIRATORY SYNCYTIAL VIRUS: ICD-10-CM

## 2024-12-12 DIAGNOSIS — Z23 NEED FOR SHINGLES VACCINE: ICD-10-CM

## 2024-12-12 ASSESSMENT — PAIN SCALES - GENERAL: PAINLEVEL_OUTOF10: NO PAIN (0)

## 2024-12-12 NOTE — TELEPHONE ENCOUNTER
Reason for Call:  Appointment Request    Patient requesting this type of appt:  other    Requested provider: Aron Villa    Reason patient unable to be scheduled: Needs to be scheduled by clinic    When does patient want to be seen/preferred time: Same day or asap    Comments: Pt need an appt for itchy and burning spots on leg before leaving out of town on 12/17    Could we send this information to you in [facility] or would you prefer to receive a phone call?:   Patient would prefer a phone call   Okay to leave a detailed message?: Yes at Cell number on file:    Telephone Information:   Mobile [phone]       Call taken on 12/12/2024 at 10:51 AM by Adriana Bunch

## 2024-12-12 NOTE — TELEPHONE ENCOUNTER
"Nurse Triage SBAR    Is this a 2nd Level Triage? NO    Situation: Patient reports itchy \"red blotches\" on BLE.     Background: Patient states she went to [facility] on 12/10 and they didn't know what it was but gave her triamcinolone and an abx cream to try. She states she has tried the triamcinolone and that helps with the itching.     Patient states the only new thing or change she can think of is she changed her probiotic after her annual physical last week as PCP suggested she increase her probiotic intake for gas relief. She states the pharmacist recommended a different probiotic that had more strains. She states she forgot to mention this when she went to .     Assessment: Patient states she developed red blotches that are raised on her lower legs about 4-5 days ago. She states at first they had a head to them but now are just raised red spots. She states they have been itchy and painful at times. States she takes extra strength tylenol on a regular basis for other pain so this has helped with the pain to the red areas. She states the triamcinolone has helped with the itching, but no change in the appearance. She states she did wake up itching the upper part of her leg during the night, but did not notice any redness there. She states she doesn't really want to start the abx cream without Dr. Villa seeing her because the other providers at  didn't know what it was and she is going out of town next week.     Protocol Recommended Disposition:   See in Office Today    Recommendation: Per protocol, patient should be seen in office today. Appointment made for today 12/12/24, per patient request. Advised her to call back or seek urgent/emergency care for new or worsening symptoms. She verbalized understanding and had no other questions or concerns.       Laura Valladares, BINGN, RN          Reason for Disposition   Patient wants to be seen    Additional Information   Negative: Sounds like a life-threatening emergency " to the triager   Negative: Athlete's Foot suspected (i.e., itchy rash between the toes)   Negative: Insect bite(s) suspected   Negative: Jock Itch suspected (i.e., itchy rash on inner thighs near genital area)   Negative: Localized lump (or swelling) without redness or rash   Negative: MPOX SUSPECTED (e.g., direct skin contact such as sex, recent travel to [place] or [place]) and any SYMPTOMS OF MPOX (e.g., rash, fever, muscle aches, or swollen lymph nodes)   Negative: AT RISK FOR MPOX (men-who-have-sex-with-men) and POSSIBLE EXPOSURE (e.g., multiple sex partners in past 21 days) and ANY SYMPTOMS OF MPOX (e.g., rash, fever, muscle aches, or swollen lymph nodes)   Negative: Poison ivy, oak, or sumac rash suspected (e.g., itchy rash after contact with poison ivy)   Negative: Rash of female genital area (e.g., labia, vagina, vulva)   Negative: Rash of male genital area (e.g., penis, scrotum)   Negative: Redness of immunization site   Negative: Shingles suspected (i.e., painful rash, multiple small blisters grouped together in one area of body; dermatomal distribution)   Negative: Small spot, skin growth, or mole   Negative: Wound infection suspected (i.e., pain, spreading redness, or pus; in a cut, puncture, scrape or sutured wound)   Negative: Fever and localized purple or blood-colored spots or dots that are not from injury or friction   Negative: Fever and localized rash is very painful   Negative: Patient sounds very sick or weak to the triager   Negative: Looks like a boil, infected sore, deep ulcer, or other infected rash (spreading redness, pus)   Negative: Painful rash with multiple small blisters grouped together (i.e., dermatomal distribution or 'band' or 'stripe')   Negative: Localized rash is very painful (no fever)   Negative: Localized purple or blood-colored spots or dots that are not from injury or friction (no fever)   Negative: Lyme disease suspected (e.g., bull's-eye rash or tick bite /  exposure)    Protocols used: Rash or Redness - [id number]

## 2024-12-12 NOTE — PROGRESS NOTES
"  {PROVIDER CHARTING PREFERENCE:859883}    Charleen Porter is a 84 year old, presenting for the following health issues:  Rash (Red blotches on legs for 1 week)      12/12/2024     2:22 PM   Additional Questions   Roomed by Lucina     Rash  Associated symptoms include a rash.   History of Present Illness       Reason for visit:  Red bloches on legs  Symptom onset:  1-2 weeks ago  Symptoms include:  Red bloches on legs, itchy at first and now painful  Symptom intensity:  Mild  Symptom progression:  Improving  Had these symptoms before:  No  What makes it worse:  No  What makes it better:  Cortisone and triamcinalone cream for itching   She is taking medications regularly.       {MA/LPN/RN Pre-Provider Visit Orders- hCG/UA/Strep (Optional):650940}  {SUPERLIST (Optional):437337}  {additonal problems for provider to add (Optional):174148}    {ROS Picklists (Optional):084860}      Objective    /60   Pulse 73   Temp 97.2  F (36.2  C) (Temporal)   Resp 24   Ht 1.613 m (5' 3.5\")   Wt 60.8 kg (134 lb)   SpO2 97%   BMI 23.36 kg/m    Body mass index is 23.36 kg/m .  Physical Exam   {Exam List (Optional):848239}    {Diagnostic Test Results (Optional):663128}        Signed Electronically by: Aron Villa DO  {Email feedback regarding this note to [email or web address]   :883427}  "

## 2025-01-14 ENCOUNTER — OFFICE VISIT (OUTPATIENT)
Dept: AUDIOLOGY | Facility: CLINIC | Age: 85
End: 2025-01-14
Payer: COMMERCIAL

## 2025-01-14 DIAGNOSIS — H90.3 BILATERAL SENSORINEURAL HEARING LOSS: Primary | ICD-10-CM

## 2025-01-14 NOTE — PROGRESS NOTES
AUDIOLOGY REPORT     SUBJECTIVE:  Charlotte Snow is a 84 year old female who was seen in the Audiology Clinic at the [facility] on 1/14/2025 for a hearing check. She was fit with binaural ReSound Nexia 960S hearing aids 10/8/2024. 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 Fair word understanding in quiet 66% right, 64% left, and 90% binaural, 50-word list.      Charlotte notes continued feedback, especially when in the car, near walls/doors, and on the phone when using direct connection (her son, daughter and friend notice a squawky sound quality, she also notes this quality for others voices). When not experiencing these difficulties she does not improved hearing with the hearing aids.     OBJECTIVE:   Based on patient report, the following changes were made; changed from pressure vent to occluded vent. Adjusted programming to re-calibrate feedback right and left after occluding vent and changed from mild to moderate bass boost in phone streaming.     Otoscopic exam showed minimal wax, not removed today.     Hearing aid maintenance was completed to replace wax filters right and left, and vacuum fan ports.     No charge visit today (in warranty hearing aid check).     ASSESSMENT:   A follow-up appointment for hearing aid fitting was completed today. Changes to hearing aid was completed as outlined above.      PLAN:  Order replacement earmolds: change to skeleton, no vent, and shorten canal so  filter is exposed and she can change without removing the .  Follow up on programming changes for sound quality while on the phone and using bluetooth streaming to her hearing aids (want to move away from speaker).     Please call this clinic with any questions regarding today s appointment.       Cecilia Ordonez.  [place] Licensed Audiologist #[id number]

## 2025-02-17 ENCOUNTER — OFFICE VISIT (OUTPATIENT)
Dept: INTERNAL MEDICINE | Facility: CLINIC | Age: 85
End: 2025-02-17
Payer: COMMERCIAL

## 2025-02-17 VITALS
BODY MASS INDEX: 22.06 KG/M2 | TEMPERATURE: 96.9 F | HEIGHT: 65 IN | WEIGHT: 132.4 LBS | RESPIRATION RATE: 22 BRPM | OXYGEN SATURATION: 100 % | HEART RATE: 64 BPM | SYSTOLIC BLOOD PRESSURE: 110 MMHG | DIASTOLIC BLOOD PRESSURE: 65 MMHG

## 2025-02-17 DIAGNOSIS — R15.1 FECAL SMEARING: ICD-10-CM

## 2025-02-17 DIAGNOSIS — E11.42 DIABETIC POLYNEUROPATHY ASSOCIATED WITH TYPE 2 DIABETES MELLITUS (H): Primary | ICD-10-CM

## 2025-02-17 PROCEDURE — G2211 COMPLEX E/M VISIT ADD ON: HCPCS | Performed by: INTERNAL MEDICINE

## 2025-02-17 PROCEDURE — 99214 OFFICE O/P EST MOD 30 MIN: CPT | Performed by: INTERNAL MEDICINE

## 2025-02-17 RX ORDER — CHOLESTYRAMINE 4 G/9G
2 POWDER, FOR SUSPENSION ORAL DAILY
Qty: 348.6 G | Refills: 0 | Status: SHIPPED | OUTPATIENT
Start: 2025-02-17

## 2025-02-17 ASSESSMENT — PAIN SCALES - GENERAL: PAINLEVEL_OUTOF10: NO PAIN (0)

## 2025-02-17 NOTE — PROGRESS NOTES
Charleen Porter is a 84 year old, presenting for the following health issues:  Incontinence and GI Problem        2/17/2025     9:45 AM   Additional Questions   Roomed by Azam     History of Present Illness       Reason for visit:  Incontinence/GI Problem  Symptom onset:  3-4 weeks ago  Symptom intensity:  Moderate  Symptom progression:  Staying the same  Had these symptoms before:  No  What makes it worse:  Nothing  What makes it better:  Nothing   She is taking medications regularly.           EMR reviewed including:             Complaint, History of Chief Complaint, Corresponding Review of Systems, and Complaint Specific Physical Examination.    #1   Patient complains of some mild fecal incontinence and smearing.  Notes that she has had no diarrhea but does have an occasional soft stool.  Has no sensation of this but finds fecal matter on her undergarments.  Has a history of type 2 diabetes with some polyneuropathy.  Denies any melena or hematochezia.  Family history of colon cancer.  Last endoscopy about 2 years ago was unremarkable with 1 small polyp.          Exam:   GI: Abdomen is soft, without rebound, guarding or tenderness. Bowel sounds are appropriate. No renal bruits are heard.   Constitutional: The patient appears to be in no acute distress. The patient appears to be adequately hydrated. No acute respiratory or hemodynamic distress is noted at this time.      #2   Follow-up on type 2 diabetes.  Patient watching diet closely.  Continues with glucose monitoring continually.  Is on metformin only.  Uses atorvastatin, Plavix, but no ACE/ARB.  (Hypotensive).          Exam:   LUNGS: clear bilaterally, airflow is brisk, no intercostal retraction or stridor is noted. No coughing is noted during visit.   HEART:  regular without rubs, clicks, gallops, or murmurs. PMI is nondisplaced. Upstrokes are brisk. S1,S2 are heard.   GI: Abdomen is soft, without rebound, guarding or tenderness. Bowel sounds are  "appropriate. No renal bruits are heard.   NEURO: Pt is alert and appropriate. No neurologic lateralization is noted. Cranial nerves 2-12 are intact. Peripheral sensory  function is minimally decreased in the lower extremities.   SKIN:  warm and dry. No erythema, or rashes are noted. No specific lesions of concern are noted.           Patient has been interviewed, applicable history and applied review of systems have been performed.    Vital Signs:   /65 (BP Location: Right arm, Patient Position: Sitting, Cuff Size: Adult Regular)   Pulse 64   Temp 96.9  F (36.1  C) (Temporal)   Resp 22   Ht 1.65 m (5' 4.96\")   Wt 60.1 kg (132 lb 6.4 oz)   SpO2 100%   BMI 22.06 kg/m        Decision Making    Problem and Complexity     1. Diabetic polyneuropathy associated with type 2 diabetes mellitus (H) (Primary)  Lab work reviewed, patient's hemoglobin A1c markedly improved.  Continue current medication and diet    2. Fecal smearing  Discussed Kegel exercises in detail.  Will start on low-dose of cholestyramine and patient will adjust according to needs.  - cholestyramine (QUESTRAN) 4 GM/DOSE powder; Take 2 g by mouth daily.  Dispense: 348.6 g; Refill: 0                                FOLLOW UP   I have asked the patient to make an appointment for followup with me in 1 month with results and progress    Regarding routine vaccinations:  I have reviewed the patient's vaccination schedule and discussed the benefits of prophylactic vaccination in detail.  I recommend the patient contact their pharmacist for vaccinations.  Discussed that most insurance companies now favor reimbursement to the pharmacies and it will financially behoove the patient to have vaccinations performed at their pharmacy.        I have carefully explained the diagnosis and treatment options to the patient.  The patient has displayed an understanding of the above, and all subsequent questions were answered.      Aron Villa DO " FACOI    Portions of this note were produced using [employer]  Although every attempt at real-time proof reading has been made, occasional grammar/syntax errors may have been missed.

## 2025-02-20 ENCOUNTER — OFFICE VISIT (OUTPATIENT)
Dept: AUDIOLOGY | Facility: CLINIC | Age: 85
End: 2025-02-20
Payer: COMMERCIAL

## 2025-02-20 DIAGNOSIS — H90.3 BILATERAL SENSORINEURAL HEARING LOSS: Primary | ICD-10-CM

## 2025-02-20 NOTE — PROGRESS NOTES
AUDIOLOGY REPORT     SUBJECTIVE:  Charlotte Snow is a 84 year old female who was seen in the Audiology Clinic at the [facility] on 2/20/2025 for a hearing check. She was fit with binaural ReSound Nexia 960S hearing aids 10/8/2024. 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 Fair word understanding in quiet 66% right, 64% left, and 90% binaural, 50-word list.      Charlotte notes she is not hearing well and is struggling to replace the wax filters.     OBJECTIVE:   Based on patient report, the following changes were made; modified earmolds to reduce canal length so she can remove and replace wax filters.     Otoscopic exam showed minimal wax, removed today.     No charge visit today (in warranty hearing aid check).     ASSESSMENT:   A follow-up appointment for hearing aid fitting was completed today. Changes to hearing aid was completed as outlined above.      PLAN:  Ordered replacement earmolds: change to skeleton, no vent, and shorten canal so  filter is exposed and she can change without removing the . Schedule appointment in 2-3 weeks when new molds arrive.      Please call this clinic with any questions regarding today s appointment.        Cecilia Ordonez.  [place] Licensed Audiologist #[id number]

## 2025-03-06 ENCOUNTER — OFFICE VISIT (OUTPATIENT)
Dept: AUDIOLOGY | Facility: CLINIC | Age: 85
End: 2025-03-06
Payer: COMMERCIAL

## 2025-03-06 DIAGNOSIS — H90.3 BILATERAL SENSORINEURAL HEARING LOSS: Primary | ICD-10-CM

## 2025-03-06 NOTE — PROGRESS NOTES
AUDIOLOGY REPORT     SUBJECTIVE:  Charlotte Snow is a 84 year old female who was seen in the Audiology Clinic at the [facility] on 2/20/2025 to fit replacement earmolds (shorter canal so she can change the wax filter and skeleton lock to control feedback). She was fit with binaural ReSound Nexia 960S hearing aids 10/8/2024. 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 Fair word understanding in quiet 66% right, 64% left, and 90% binaural, 50-word list.      OBJECTIVE:   Fit replacement molds, needed to modify canal length to shorten to expose filter.     No charge visit today (in warranty hearing aid check).     ASSESSMENT:   A follow-up appointment for hearing aid fitting was completed today. Changes to hearing aid was completed as outlined above.      PLAN:  Return as needed, follow up by nathan in 2-4 days.      Please call this clinic with any questions regarding today s appointment.        Mynor Ordonez Licensed Audiologist #[id number]

## 2025-03-10 ENCOUNTER — OFFICE VISIT (OUTPATIENT)
Dept: AUDIOLOGY | Facility: CLINIC | Age: 85
End: 2025-03-10
Payer: COMMERCIAL

## 2025-03-10 DIAGNOSIS — H90.3 BILATERAL SENSORINEURAL HEARING LOSS: Primary | ICD-10-CM

## 2025-03-10 DIAGNOSIS — G45.9 TIA (TRANSIENT ISCHEMIC ATTACK): ICD-10-CM

## 2025-03-10 PROCEDURE — V5010 ASSESSMENT FOR HEARING AID: HCPCS

## 2025-03-10 RX ORDER — ATORVASTATIN CALCIUM 40 MG/1
TABLET, FILM COATED ORAL
Qty: 90 TABLET | Refills: 2 | Status: SHIPPED | OUTPATIENT
Start: 2025-03-10

## 2025-03-10 NOTE — PROGRESS NOTES
AUDIOLOGY REPORT    SUBJECTIVE:Charlotte Snow is a 84 year old female who was seen in the Audiology Clinic at the [facility] on 3/10/2025  for a hearing aid check. Previous results from 4/2/2024 revealed bilateral sensorineural hearing loss.  The patient has been seen previously in this clinic and was fit with binaural ReSound Nexia 960S hearing aids 10/8/2024.     Charlotte reports the hearing aids are producing feedback.    SIDE: Binaural:                          : ReSound                          TYPE: Nexia 960S                          S/N:                                       R: [id number]                                       L:  [id number]                           WARRANTY: 10/20/27     OBJECTIVE: Based on patient report, the following changes were made:    1) Wax filters were changed.  2)  New feedback manager preformed.   3) Re-instruction on how to place new earmolds in her ears, as earmolds were not fully inserted upon arrival to the clinic today    ASSESSMENT: A hearing aid check was completed today.  Changes to hearing aid was completed as outlined above. No charge visit today (in warranty hearing aid check).    PLAN:Charlotte will return for follow-up as needed, or at least every 9-12 months for cleaning and assessment of hearing aid.  . Please call this clinic with any questions regarding today s appointment.    Mynor Bolanos, CCC-A  Doctor of Audiology, [place] #[id number]   March 10, 2025

## 2025-03-25 NOTE — PROGRESS NOTES
AUDIOLOGY REPORT     BACKGROUND:  Charlotte was seen to trial premium hearing aid technology. 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 Fair word understanding in quiet 66% right, 64% left, and 90% binaural, 50-word list.     RESULTS:  Programmed [employer] Linx Quattro 9 MARYBETH devices, first fit experienced, adjusted to patient preference.      RECOMMENDATIONS:  Return in 2-3 weeks for follow up on hearing aid trial. Continue use of Furn[employer] lisbeth, for listening practice.     Mynor Ordonez Licensed Audiologist #[id number]

## 2025-04-17 ENCOUNTER — OFFICE VISIT (OUTPATIENT)
Dept: AUDIOLOGY | Facility: CLINIC | Age: 85
End: 2025-04-17
Payer: COMMERCIAL

## 2025-04-17 DIAGNOSIS — H90.3 BILATERAL SENSORINEURAL HEARING LOSS: Primary | ICD-10-CM

## 2025-04-17 NOTE — PROGRESS NOTES
AUDIOLOGY REPORT    SUBJECTIVE:Charlotte Snow is a 84 year old female who was seen in the Audiology Clinic at the [facility] on 4/17/2025  for a hearing aid check. Previous results from 4/2/2024 revealed bilateral sensorineural hearing loss.  The patient has been seen previously in this clinic and was fit with binaural ReSound Nexia 960S hearing aids 10/8/2024.     Charlotte reports the hearing aids are no longer producing feedback. Charlotte reports she is now noticing occasionally a sizzling sound with the hearing aids on. She believes this most commonly occurs when the TV is on .    SIDE: Binaural:                          : ReSound                          TYPE: Nexia 960S                          S/N:                                       R: [id number]                                       L:  [id number]                           WARRANTY: 10/20/27     OBJECTIVE: Based on patient report, the following changes were made:    1) Wax filters were changed and hearing aids were cleaned  2) Sound management settings for the programming software were adjusted today.    Charlotte reported the hearing aids sounded crisp and clear after today's changes.    Otoscopy revealed clear canals bilaterally today.    ASSESSMENT: A hearing aid check was completed today.  Changes to hearing aid was completed as outlined above. No charge visit today (in warranty hearing aid check).    PLAN:Charlotte will return for follow-up as needed, or at least every 9-12 months for cleaning and assessment of hearing aid.   Charlotte is to inform the clinic if today's changes help with the sizzling sound she has been experiencing. Charlotte was encouraged to keep track of how often she is noticing that sound from the hearing aids. Please call this clinic with any questions regarding today s appointment.    Mynor Bolanos, CCC-A  Doctor of Audiology, [place] #[id number]   April 17, 2025

## 2025-04-19 DIAGNOSIS — K21.9 GASTROESOPHAGEAL REFLUX DISEASE WITHOUT ESOPHAGITIS: ICD-10-CM

## 2025-04-21 RX ORDER — PANTOPRAZOLE SODIUM 40 MG/1
40 TABLET, DELAYED RELEASE ORAL DAILY
Qty: 90 TABLET | Refills: 1 | Status: SHIPPED | OUTPATIENT
Start: 2025-04-21

## 2025-04-28 NOTE — PROGRESS NOTES
AUDIOLOGY REPORT     SUBJECTIVE:   Charlotte Snow, a 84 year old female, was seen in the Audiology Clinic at [facility] today for follow up after fitting binaural ReSound Nexia 960S-DRWC hearings 10/8/2024. 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 Fair word understanding in quiet 66% right, 64% left, and 90% binaural, 50-word list.      Charlotte noted hearing well in general, she had questions about streaming.     OBJECTIVE:    Removed hands free option to test if this is causing issues with how others are hearing her when streaming calls.     Hearing aids are set to match NAL-NL2 re: 4/2024 audio, match 500-3k slightly under target at 4k (looks like limited due to feedback calibration did not plug vent in the earmold    ASSESSMENT:   Hearing aid changes as above.     PLAN:   Ms. Snow will return for follow-up in 2-3 weeks for a hearing aid review appointment. Please call this clinic with questions regarding today s appointment.           Cecilia Ordonez.  [place] Licensed Audiologist #[id number]

## 2025-05-24 ENCOUNTER — HEALTH MAINTENANCE LETTER (OUTPATIENT)
Age: 85
End: 2025-05-24

## 2025-07-08 ENCOUNTER — TRANSFERRED RECORDS (OUTPATIENT)
Dept: HEALTH INFORMATION MANAGEMENT | Facility: CLINIC | Age: 85
End: 2025-07-08
Payer: COMMERCIAL

## 2025-07-08 LAB — RETINOPATHY: NEGATIVE

## 2025-07-22 DIAGNOSIS — N95.2 ATROPHIC VAGINITIS: ICD-10-CM

## 2025-07-22 RX ORDER — ESTRADIOL 0.1 MG/G
CREAM VAGINAL
Qty: 42.5 G | Refills: 0 | Status: SHIPPED | OUTPATIENT
Start: 2025-07-22

## 2025-08-06 ENCOUNTER — MYC MEDICAL ADVICE (OUTPATIENT)
Dept: PHARMACY | Facility: CLINIC | Age: 85
End: 2025-08-06
Payer: COMMERCIAL

## 2025-08-16 ENCOUNTER — HEALTH MAINTENANCE LETTER (OUTPATIENT)
Age: 85
End: 2025-08-16

## (undated) DEVICE — LUBRICATING JELLY 4.25OZ

## (undated) DEVICE — KIT ENDO TURNOVER/PROCEDURE CARRY-ON 101822

## (undated) DEVICE — SOL WATER IRRIG 1000ML BOTTLE 07139-09

## (undated) DEVICE — GLOVE EXAM NITRILE LG

## (undated) DEVICE — TUBING SUCTION 12"X1/4" N612

## (undated) RX ORDER — PROPOFOL 10 MG/ML
INJECTION, EMULSION INTRAVENOUS
Status: DISPENSED
Start: 2022-02-17